# Patient Record
Sex: FEMALE | Race: WHITE | NOT HISPANIC OR LATINO | Employment: OTHER | URBAN - NONMETROPOLITAN AREA
[De-identification: names, ages, dates, MRNs, and addresses within clinical notes are randomized per-mention and may not be internally consistent; named-entity substitution may affect disease eponyms.]

---

## 2017-01-09 DIAGNOSIS — S92.024D CLOSED NONDISPLACED FRACTURE OF ANTERIOR PROCESS OF RIGHT CALCANEUS WITH ROUTINE HEALING, SUBSEQUENT ENCOUNTER: Primary | ICD-10-CM

## 2017-01-11 ENCOUNTER — OFFICE VISIT (OUTPATIENT)
Dept: ORTHOPEDIC SURGERY | Facility: CLINIC | Age: 52
End: 2017-01-11

## 2017-01-11 ENCOUNTER — HOSPITAL ENCOUNTER (OUTPATIENT)
Dept: GENERAL RADIOLOGY | Facility: HOSPITAL | Age: 52
Discharge: HOME | End: 2017-01-11
Attending: ORTHOPAEDIC SURGERY | Admitting: ORTHOPAEDIC SURGERY

## 2017-01-11 VITALS
BODY MASS INDEX: 25.4 KG/M2 | HEART RATE: 77 BPM | WEIGHT: 138 LBS | DIASTOLIC BLOOD PRESSURE: 80 MMHG | SYSTOLIC BLOOD PRESSURE: 122 MMHG | HEIGHT: 62 IN

## 2017-01-11 DIAGNOSIS — S92.254D CLOSED NONDISPLACED FRACTURE OF NAVICULAR BONE OF RIGHT FOOT WITH ROUTINE HEALING, SUBSEQUENT ENCOUNTER: ICD-10-CM

## 2017-01-11 DIAGNOSIS — S92.024D CLOSED NONDISPLACED FRACTURE OF ANTERIOR PROCESS OF RIGHT CALCANEUS WITH ROUTINE HEALING, SUBSEQUENT ENCOUNTER: ICD-10-CM

## 2017-01-11 DIAGNOSIS — S92.024D CLOSED NONDISPLACED FRACTURE OF ANTERIOR PROCESS OF RIGHT CALCANEUS WITH ROUTINE HEALING, SUBSEQUENT ENCOUNTER: Primary | ICD-10-CM

## 2017-01-11 PROBLEM — M79.671 FOOT PAIN, RIGHT: Status: ACTIVE | Noted: 2017-01-11

## 2017-01-11 PROCEDURE — 73630 X-RAY EXAM OF FOOT: CPT | Performed by: RADIOLOGY

## 2017-01-11 PROCEDURE — 73630 X-RAY EXAM OF FOOT: CPT

## 2017-01-11 PROCEDURE — 99024 POSTOP FOLLOW-UP VISIT: CPT | Performed by: ORTHOPAEDIC SURGERY

## 2017-01-11 NOTE — PROGRESS NOTES
Patient: Nicole Cannon  YOB: 1965  Date of Encounter: 01/11/2017        History of Present Illness:     51-year-old white female seen today secondary to an acute injury suffered 10/8/16. Her only complaint today is of coldness of the right foot as well as swelling, this has improved to some degree. No other complaints. She has been ambulatory and has returned back to work without significant complication. She denies any current paresthesias.     Physical Exam: 51 y.o. female .  General Appearance:    Well appearing, cooperative, in no acute distress.       Patient is alert and oriented x 3.          Musculoskeletal: She does have good movement of her toes with no subjective numbness or tingling. She has palpable dorsalis pedis and tibial posterior pulses. No tenderness to palpation of the base of the calcaneus. She can exhibit active dorsiflexion plantarflexion. Neurovascular status remains grossly intact.      Radiology:       Repeat radiographs today of the right foot and calcaneus reveal no significant change in alignment or positioning of the fracture with notable consolidation and diminishing fracture lines.    Assessment:    ICD-10-CM ICD-9-CM   1. Closed nondisplaced fracture of anterior process of right calcaneus with routine healing, subsequent encounter S92.024D V54.19   2. Closed nondisplaced fracture of navicular bone of right foot with routine healing, subsequent encounter S92.254D V54.19       Plan:    situation was discussed with patient in regards to the 3 month history of the calcaneus fracture. There does appear to be continued consolidation and diminishing fracture line as well as she seems to be responding well with ambulation with mild swelling. She will continue to progress her activity as tolerated. She will report back in 6 weeks for repeat x-rays and likely final evaluation.        Discussion and Summary:    Written by Kendall Jeffers PA-C, acting as scribe for Dr. Nelsy JEFF  Dr. Whittington personally performed the services described in this documentation, as scribed by Kendall Jeffers PA-C, in my presence, and is both accurate and complete.      This document was signed by Kendall Jeffers PA-C January 11, 2017 4:15 PM

## 2017-01-11 NOTE — MR AVS SNAPSHOT
Nicole Cannon   1/11/2017 3:20 PM   Office Visit    Dept Phone:  [phone]   Encounter #:  [id number]    Provider:  Bladimir Whittington MD   Department:  [facility]                Your Full Care Plan              Today's Medication Changes          These changes are accurate as of: 1/11/17  4:20 PM.  If you have any questions, ask your nurse or doctor.               Stop taking medication(s)listed here:     ANGELIQ 0.25-0.5 MG per tablet   Generic drug:  drospirenone-estradiol   Stopped by:  Bladimir Whittington MD           glucosamine-chondroitin 500-400 MG capsule capsule   Stopped by:  Bladimir Whittington MD           HYDROcodone-acetaminophen 5-325 MG per tablet   Commonly known as:  NORCO   Stopped by:  Bladimir Whittington MD           hydroxychloroquine 200 MG tablet   Commonly known as:  PLAQUENIL   Stopped by:  Bladimir Whittington MD           raNITIdine 75 MG tablet   Commonly known as:  ZANTAC   Stopped by:  Bladimir Whittington MD           traMADol 50 MG tablet   Commonly known as:  ULTRAM   Stopped by:  Bladimir Whittington MD           vitamin B-12 1000 MCG tablet   Commonly known as:  CYANOCOBALAMIN   Stopped by:  Bladimir Whittington MD                      Your Updated Medication List          This list is accurate as of: 1/11/17  4:20 PM.  Always use your most recent med list.                cetirizine 10 MG tablet   Commonly known as:  zyrTEC       cholecalciferol 1000 UNITS tablet   Commonly known as:  VITAMIN D3       LORazepam 0.5 MG tablet   Commonly known as:  ATIVAN   Take 1 tablet by mouth 3 (three) times a day as needed for anxiety.       melatonin 1 MG tablet       MULTI FOR HER PO       ondansetron 4 MG tablet   Commonly known as:  ZOFRAN   Take 1 tablet by mouth 4 (Four) Times a Day As Needed for nausea or vomiting. PRN Nausea/vomiting       vitamin E 400 UNIT capsule               You Were Diagnosed With        Codes Comments    Closed nondisplaced fracture of anterior process of right calcaneus with routine healing, subsequent encounter    -  Primary ICD-10-CM: S92.024D  ICD-9-CM: V54.19     Closed nondisplaced fracture of navicular bone of right foot with routine healing, subsequent encounter     ICD-10-CM: S92.254D  ICD-9-CM: V54.19       Instructions     None    Patient Instructions History      Upcoming Appointments     Visit Type Date Time Department    FOLLOW UP 2017  3:20 PM [facility] [place]    XR COR FOOT 3+ VW L 2017  3:30 PM [facility]    FOLLOW UP 2017  3:10 PM [facility] [place]      [employer] Signup     [employer] allows you to send messages to your doctor, view your test results, renew your prescriptions, schedule appointments, and more. To sign up, go to [employer] and click on the Sign Up Now link in the New User? box. Enter your [employer] Activation Code exactly as it appears below along with the last four digits of your Social Security Number and your Date of Birth () to complete the sign-up process. If you do not sign up before the expiration date, you must request a new code.    [employer] Activation Code: [email or web address]  Expires: 2017  4:19 PM    If you have questions, you can email [email or web address] or call [phone] to talk to our [employer] staff. Remember, [employer] is NOT to be used for urgent needs. For medical emergencies, dial 911.               Other Info from Your Visit           Your Appointments     2017  3:10 PM EST   Follow Up with Bladimir Whittington MD   [facility] (--)    [address]  [place] [address] [phone]           Arrive 15 minutes prior to appointment.              Allergies     No Known Allergies      Reason for Visit     Right Foot - Pain     Foot Pain           Vital Signs     Blood Pressure Pulse Height Weight Body Mass Index Smoking Status     "122/80 77 62\" (157.5 cm) 138 lb (62.6 kg) 25.24 kg/m2 Never Smoker      Problems and Diagnoses Noted     Foot pain, right    Closed nondisplaced fracture of navicular bone of right foot with routine healing, subsequent encounter            "

## 2017-03-09 ENCOUNTER — OFFICE VISIT (OUTPATIENT)
Dept: FAMILY MEDICINE CLINIC | Facility: CLINIC | Age: 52
End: 2017-03-09

## 2017-03-09 VITALS
SYSTOLIC BLOOD PRESSURE: 135 MMHG | DIASTOLIC BLOOD PRESSURE: 84 MMHG | OXYGEN SATURATION: 99 % | BODY MASS INDEX: 26.68 KG/M2 | TEMPERATURE: 98.2 F | HEIGHT: 62 IN | WEIGHT: 145 LBS | HEART RATE: 89 BPM

## 2017-03-09 DIAGNOSIS — F41.1 GENERALIZED ANXIETY DISORDER: Primary | ICD-10-CM

## 2017-03-09 DIAGNOSIS — S92.024S CLOSED NONDISPLACED FRACTURE OF ANTERIOR PROCESS OF RIGHT CALCANEUS, SEQUELA: ICD-10-CM

## 2017-03-09 PROCEDURE — 99213 OFFICE O/P EST LOW 20 MIN: CPT | Performed by: FAMILY MEDICINE

## 2017-03-09 RX ORDER — LORAZEPAM 0.5 MG/1
0.5 TABLET ORAL 3 TIMES DAILY PRN
Qty: 75 TABLET | Refills: 5
Start: 2017-03-09 | End: 2017-08-28 | Stop reason: SDUPTHER

## 2017-03-09 NOTE — PROGRESS NOTES
"Subjective   Nicole Cannon is a 51 y.o. female.     History of Present Illness follow-up for medication management regarding the anxiety.  Unfortunately is having persistent/recurrent problems with the right foot.  She had fracture and has had orthopedic evaluation.  Having no other new problems.  Has been trying to go about routine.  Has some degree of stiffness questionable some degree of decreased range of motion and also some numbness after extensive time on it.  Continues to utilize the anxiolytic 2-3 times daily fairly routinely.  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    The following portions of the patient's history were reviewed and updated as appropriate: allergies, current medications, past family history, past social history, past surgical history and problem list.    Review of Systems the the history of present illness    Objective   Physical Exam   Constitutional: She is oriented to person, place, and time. She appears well-developed and well-nourished.   Neurological: She is alert and oriented to person, place, and time.   Psychiatric: She has a normal mood and affect.   Vitals reviewed.    Visit Vitals   • /84 (BP Location: Left arm, Patient Position: Sitting, Cuff Size: Adult)   • Pulse 89   • Temp 98.2 °F (36.8 °C) (Oral)   • Ht 62\" (157.5 cm)   • Wt 145 lb (65.8 kg)   • SpO2 99%   • BMI 26.52 kg/m2     Assessment/Plan   Nicole was seen today for foot injury and med refill.    Diagnoses and all orders for this visit:    Generalized anxiety disorder    Closed nondisplaced fracture of anterior process of right calcaneus, sequela    Other orders  -     LORazepam (ATIVAN) 0.5 MG tablet; Take 1 tablet by mouth 3 (Three) Times a Day As Needed for Anxiety.      Today I renewed the anxiolytic #75 with 5 refills.  I encourage you to pursue using less of that " medicine as we have talked before.  In regards to the foot I recommend you follow-up with orthopedist and you'll pursue self referral back.  Follow-up with us as needed.  As part of this patient's treatment plan I am prescribing controlled substances. The patient has been made aware of appropriate use of such medications, including potential risk of somnolence, limited ability to drive and/or work safely, and potential for dependence or overdose. It has also been made clear that these medications are for use by this patient only, without concomitant use of alcohol or other substances unless prescribed.  Patient has completed prescribing agreement detailing terms of continued prescribing of controlled substances, including monitoring JINNY reports, urine drug screening, and pill counts if necessary. The patient is aware that inappropriate use will results in cessation of prescribing such medications.  JINNY report has been reviewed.  JINNY is updated every 3 months.  History and physical exam exhibit continued safe and appropriate use of controlled substances.     .

## 2017-04-11 DIAGNOSIS — S92.024D CLOSED NONDISPLACED FRACTURE OF ANTERIOR PROCESS OF RIGHT CALCANEUS WITH ROUTINE HEALING, SUBSEQUENT ENCOUNTER: Primary | ICD-10-CM

## 2017-04-12 ENCOUNTER — HOSPITAL ENCOUNTER (OUTPATIENT)
Dept: GENERAL RADIOLOGY | Facility: HOSPITAL | Age: 52
Discharge: HOME | End: 2017-04-12
Attending: ORTHOPAEDIC SURGERY | Admitting: ORTHOPAEDIC SURGERY

## 2017-04-12 ENCOUNTER — OFFICE VISIT (OUTPATIENT)
Dept: ORTHOPEDIC SURGERY | Facility: CLINIC | Age: 52
End: 2017-04-12

## 2017-04-12 VITALS — BODY MASS INDEX: 26.68 KG/M2 | HEIGHT: 62 IN | WEIGHT: 145 LBS

## 2017-04-12 DIAGNOSIS — M79.671 RIGHT FOOT PAIN: ICD-10-CM

## 2017-04-12 DIAGNOSIS — S92.024D CLOSED NONDISPLACED FRACTURE OF ANTERIOR PROCESS OF RIGHT CALCANEUS WITH ROUTINE HEALING, SUBSEQUENT ENCOUNTER: ICD-10-CM

## 2017-04-12 DIAGNOSIS — S92.024S CLOSED NONDISPLACED FRACTURE OF ANTERIOR PROCESS OF RIGHT CALCANEUS, SEQUELA: Primary | ICD-10-CM

## 2017-04-12 PROCEDURE — 73630 X-RAY EXAM OF FOOT: CPT | Performed by: RADIOLOGY

## 2017-04-12 PROCEDURE — 73630 X-RAY EXAM OF FOOT: CPT

## 2017-04-12 PROCEDURE — 99213 OFFICE O/P EST LOW 20 MIN: CPT | Performed by: ORTHOPAEDIC SURGERY

## 2017-04-12 NOTE — PROGRESS NOTES
Patient: Nicole Cannon  YOB: 1965  Date of Encounter: 04/12/2017        History of Present Illness:   Nicole Cannon, 51 y.o. Female, presents today in follow up of right calcaneal fracture approximately 6 months ago.   She complains of pain and swelling, especially with weightbearing and walking.  She has had no additional injuries.  Was doing reasonably well in the fall but has simply plateaued and in very little improvement in the last few months.    Examination:   Right Foot: He has mild edema of the right foot compared to the left.  She has generalized tenderness about her entire foot area and she has 0 subtalar motion.  Normal neurovascular status.    Radiology:     3 views right foot obtained today show the fracture of her calcaneal body to be united with normal gross architecture of her calcaneus.  She has moderate osteopenia.    EXAMINATION: XR FOOT 3+ VW RIGHT-       CLINICAL INDICATION: right calcaneus fx; S92.024D-Nondisplaced fracture  of anterior process of right calcaneus, subsequent encounter for  fracture with routine healing          COMPARISON: 01/11/2017      FINDINGS: 3 views of the right foot show no acute fracture or  dislocation. There are no blastic or lytic lesions.      IMPRESSION:  No acute bony abnormality.  Particularly no new calcaneal fracture       This report was finalized on 4/12/2017 3:39 PM by Dr. Albaro Barlow MD.        Assessment:   51-year-old female with pain, decreased range of motion and swelling 6 months post right calcaneal fracture.  She is obviously not doing as well as expected given the initial appearance of her fracture.  Does have symptoms consistent with RSD although very mild.    ICD-10-CM ICD-9-CM   1. Closed nondisplaced fracture of anterior process of right calcaneus, sequela S92.024S 825.0   2. Right foot pain M79.671 729.5       Plan:  Today she is referred to physical therapy for an 8 week course. Will reevaluate in 8 weeks.  They will work on  her subtalar motion and also we have asked them to address her RSD-like symptoms      Cc Dr. Erickson Lira

## 2017-05-25 ENCOUNTER — OFFICE VISIT (OUTPATIENT)
Dept: FAMILY MEDICINE CLINIC | Facility: CLINIC | Age: 52
End: 2017-05-25

## 2017-05-25 VITALS
WEIGHT: 145 LBS | DIASTOLIC BLOOD PRESSURE: 95 MMHG | BODY MASS INDEX: 26.68 KG/M2 | HEART RATE: 81 BPM | SYSTOLIC BLOOD PRESSURE: 134 MMHG | TEMPERATURE: 98.8 F | OXYGEN SATURATION: 96 % | HEIGHT: 62 IN

## 2017-05-25 DIAGNOSIS — S39.012A LUMBAR STRAIN, INITIAL ENCOUNTER: Primary | ICD-10-CM

## 2017-05-25 PROCEDURE — 99213 OFFICE O/P EST LOW 20 MIN: CPT | Performed by: NURSE PRACTITIONER

## 2017-05-25 RX ORDER — METHOCARBAMOL 500 MG/1
500 TABLET, FILM COATED ORAL 3 TIMES DAILY PRN
Qty: 21 TABLET | Refills: 0 | Status: SHIPPED | OUTPATIENT
Start: 2017-05-25 | End: 2017-08-28

## 2017-05-25 RX ORDER — DICLOFENAC SODIUM 75 MG/1
TABLET, DELAYED RELEASE ORAL 2 TIMES DAILY
COMMUNITY
Start: 2017-05-23 | End: 2017-08-28

## 2017-08-28 ENCOUNTER — OFFICE VISIT (OUTPATIENT)
Dept: FAMILY MEDICINE CLINIC | Facility: CLINIC | Age: 52
End: 2017-08-28

## 2017-08-28 VITALS
BODY MASS INDEX: 25.51 KG/M2 | SYSTOLIC BLOOD PRESSURE: 137 MMHG | HEIGHT: 62 IN | HEART RATE: 80 BPM | WEIGHT: 138.6 LBS | TEMPERATURE: 98.8 F | DIASTOLIC BLOOD PRESSURE: 90 MMHG

## 2017-08-28 DIAGNOSIS — F41.1 GENERALIZED ANXIETY DISORDER: Primary | ICD-10-CM

## 2017-08-28 PROCEDURE — 99213 OFFICE O/P EST LOW 20 MIN: CPT | Performed by: FAMILY MEDICINE

## 2017-08-28 RX ORDER — LORAZEPAM 0.5 MG/1
0.5 TABLET ORAL 3 TIMES DAILY PRN
Qty: 75 TABLET | Refills: 5
Start: 2017-08-28 | End: 2018-02-08 | Stop reason: SDUPTHER

## 2017-08-29 NOTE — PROGRESS NOTES
"Subjective   Nicole Cannon is a 52 y.o. female.     History of Present Illness follow-up for medication management in regards to the chronic anxiety.  All things are doing pretty good.  Last year of school.  Still concerned with the right ankle foot orthopedic injury.  Has had more evaluation.  Will be visiting with a podiatrist in near future also.  Compliant with current medication regimen without adverse effects.  The following portions of the patient's history were reviewed and updated as appropriate: allergies, current medications, past medical history, past social history, past surgical history and problem list.    Review of Systems   Constitutional: Negative.    HENT: Negative.    Eyes: Negative.    Respiratory: Negative.    Cardiovascular: Negative.    Gastrointestinal: Negative.    Endocrine: Negative.    Genitourinary: Negative.    Musculoskeletal:        See the history of present illness   Skin: Negative.    Allergic/Immunologic: Negative.    Neurological: Negative.    Hematological: Negative.    Psychiatric/Behavioral: Negative.        Objective   Physical Exam   Constitutional: She is oriented to person, place, and time. She appears well-developed and well-nourished.   HENT:   Head: Normocephalic.   Mouth/Throat: Oropharynx is clear and moist.   Eyes: Conjunctivae and EOM are normal. Pupils are equal, round, and reactive to light.   Cardiovascular: Normal rate, regular rhythm and normal heart sounds.    No murmur heard.  Pulmonary/Chest: Effort normal and breath sounds normal.   Musculoskeletal: She exhibits no edema.   Neurological: She is alert and oriented to person, place, and time.   Skin: Skin is warm and dry.   Psychiatric: She has a normal mood and affect.   Vitals reviewed.    /90 (BP Location: Right arm, Patient Position: Sitting, Cuff Size: Adult)  Pulse 80  Temp 98.8 °F (37.1 °C) (Oral)   Ht 62\" (157.5 cm)  Wt 138 lb 9.6 oz (62.9 kg)  BMI 25.35 kg/m2  Assessment/Plan   Nicole was " seen today for anxiety.    Diagnoses and all orders for this visit:    Generalized anxiety disorder  -     LORazepam (ATIVAN) 0.5 MG tablet; Take 1 tablet by mouth 3 (Three) Times a Day As Needed for Anxiety.    Medication renewed as noted.  Counseled as regards to utilization.  We discussed the orthopedic concerns.  I encourage you to continue to pursue.  Hopefully things will work out to your desire regarding pending FPC.  Follow-up in about 6 months or as needed.  Flu vaccine soon.  As part of this patient's treatment plan I am prescribing controlled substances. The patient has been made aware of appropriate use of such medications, including potential risk of somnolence, limited ability to drive and/or work safely, and potential for dependence or overdose. It has also been made clear that these medications are for use by this patient only, without concomitant use of alcohol or other substances unless prescribed.  Patient has completed prescribing agreement detailing terms of continued prescribing of controlled substances, including monitoring JINNY reports, urine drug screening, and pill counts if necessary. The patient is aware that inappropriate use will results in cessation of prescribing such medications.  JINNY report has been reviewed.  JINNY is updated every 3 months.  History and physical exam exhibit continued safe and appropriate use of controlled substances.

## 2018-02-08 ENCOUNTER — OFFICE VISIT (OUTPATIENT)
Dept: FAMILY MEDICINE CLINIC | Facility: CLINIC | Age: 53
End: 2018-02-08

## 2018-02-08 VITALS
WEIGHT: 129.4 LBS | DIASTOLIC BLOOD PRESSURE: 87 MMHG | HEIGHT: 62 IN | HEART RATE: 83 BPM | TEMPERATURE: 98.1 F | SYSTOLIC BLOOD PRESSURE: 131 MMHG | BODY MASS INDEX: 23.81 KG/M2

## 2018-02-08 DIAGNOSIS — F41.1 GENERALIZED ANXIETY DISORDER: Primary | ICD-10-CM

## 2018-02-08 PROCEDURE — 99213 OFFICE O/P EST LOW 20 MIN: CPT | Performed by: FAMILY MEDICINE

## 2018-02-08 RX ORDER — LORAZEPAM 0.5 MG/1
0.5 TABLET ORAL 3 TIMES DAILY PRN
Qty: 75 TABLET | Refills: 5
Start: 2018-02-08 | End: 2018-08-13 | Stop reason: SDUPTHER

## 2018-02-08 RX ORDER — ASCORBIC ACID 500 MG
500 TABLET ORAL DAILY
Status: ON HOLD | COMMUNITY
End: 2020-12-31

## 2018-08-13 ENCOUNTER — OFFICE VISIT (OUTPATIENT)
Dept: FAMILY MEDICINE CLINIC | Facility: CLINIC | Age: 53
End: 2018-08-13

## 2018-08-13 VITALS
DIASTOLIC BLOOD PRESSURE: 80 MMHG | HEART RATE: 77 BPM | BODY MASS INDEX: 24.01 KG/M2 | OXYGEN SATURATION: 98 % | HEIGHT: 62 IN | TEMPERATURE: 98 F | SYSTOLIC BLOOD PRESSURE: 136 MMHG | WEIGHT: 130.5 LBS

## 2018-08-13 DIAGNOSIS — F41.1 GENERALIZED ANXIETY DISORDER: ICD-10-CM

## 2018-08-13 PROCEDURE — 99213 OFFICE O/P EST LOW 20 MIN: CPT | Performed by: FAMILY MEDICINE

## 2018-08-13 RX ORDER — LORAZEPAM 0.5 MG/1
0.5 TABLET ORAL 3 TIMES DAILY PRN
Qty: 75 TABLET | Refills: 5
Start: 2018-08-13 | End: 2019-03-04 | Stop reason: SDUPTHER

## 2018-08-13 NOTE — PROGRESS NOTES
Subjective   Nicole Cannon is a 53 y.o. female.     History of Present Illness follow-up regarding medication management.  Generally having no new difficulties.  Utilizing medications as reconciled.  So far is enjoying prison.    The following portions of the patient's history were reviewed and updated as appropriate: current medications, past family history, past medical history, past social history, past surgical history and problem list.    Review of Systems   Constitutional: Negative.    HENT: Negative.    Eyes: Negative.    Respiratory: Negative.    Cardiovascular: Negative.    Gastrointestinal: Negative.    Endocrine: Negative.    Genitourinary: Negative.    Musculoskeletal: Negative.    Skin: Negative.    Allergic/Immunologic: Negative.    Neurological: Negative.    Hematological: Negative.    Psychiatric/Behavioral: Negative.       Objective   Physical Exam   Constitutional: She appears well-developed.   Neurological: She is alert.   Psychiatric: She has a normal mood and affect.       Assessment/Plan   Nicole was seen today for med refill and generalized anxiety disorder.    Diagnoses and all orders for this visit:    Generalized anxiety disorder  -     LORazepam (ATIVAN) 0.5 MG tablet; Take 1 tablet by mouth 3 (Three) Times a Day As Needed for Anxiety.     Medication renewed.  Encourage to work toward less utilization.  You are in agreement.  Follow-up in a few months routinely.  Stay safely active.  Maintain heart healthy diet.  As part of this patient's treatment plan I am prescribing controlled substances. The patient has been made aware of appropriate use of such medications, including potential risk of somnolence, limited ability to drive and/or work safely, and potential for dependence or overdose. It has also been made clear that these medications are for use by this patient only, without concomitant use of alcohol or other substances unless prescribed.  Patient has completed prescribing agreement  detailing terms of continued prescribing of controlled substances, including monitoring JINNY reports, urine drug screening, and pill counts if necessary. The patient is aware that inappropriate use will results in cessation of prescribing such medications.  JINNY report has been reviewed.  JINNY is updated every 3 months.  History and physical exam exhibit continued safe and appropriate use of controlled substances.     Patient's Body mass index is 23.86 kg/m². BMI is within normal parameters. No follow-up required.

## 2019-03-04 ENCOUNTER — OFFICE VISIT (OUTPATIENT)
Dept: FAMILY MEDICINE CLINIC | Facility: CLINIC | Age: 54
End: 2019-03-04

## 2019-03-04 VITALS
HEIGHT: 62 IN | DIASTOLIC BLOOD PRESSURE: 76 MMHG | WEIGHT: 134 LBS | BODY MASS INDEX: 24.66 KG/M2 | SYSTOLIC BLOOD PRESSURE: 130 MMHG | HEART RATE: 94 BPM | TEMPERATURE: 97.6 F

## 2019-03-04 DIAGNOSIS — Z12.83 SKIN EXAM, SCREENING FOR CANCER: ICD-10-CM

## 2019-03-04 DIAGNOSIS — F41.1 GENERALIZED ANXIETY DISORDER: Primary | ICD-10-CM

## 2019-03-04 PROBLEM — M79.671 FOOT PAIN, RIGHT: Status: RESOLVED | Noted: 2017-01-11 | Resolved: 2019-03-04

## 2019-03-04 PROCEDURE — 99213 OFFICE O/P EST LOW 20 MIN: CPT | Performed by: FAMILY MEDICINE

## 2019-03-04 RX ORDER — LORAZEPAM 0.5 MG/1
0.5 TABLET ORAL 2 TIMES DAILY PRN
Qty: 60 TABLET | Refills: 3
Start: 2019-03-04 | End: 2019-07-09 | Stop reason: SDUPTHER

## 2019-03-04 RX ORDER — LORAZEPAM 0.5 MG/1
0.5 TABLET ORAL 3 TIMES DAILY PRN
Qty: 60 TABLET | Refills: 3
Start: 2019-03-04 | End: 2019-03-04

## 2019-03-04 RX ORDER — PSEUDOEPHEDRINE HCL 30 MG
30 TABLET ORAL AS NEEDED
Status: ON HOLD | COMMUNITY
End: 2020-12-31

## 2019-03-04 NOTE — PROGRESS NOTES
"Subjective     No chief complaint on file.      Nicole Cannon is a 53 y.o. female.     History of Present Illness follow-up regarding medication management for the mild ELSI.  Has successfully been taking some less medication.  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  Has no significant new concerns.  Is starting to develop some new skin lesions and changes desires check.  Denies recent illness.  Denies CP S OB palpitations  GI changes.  Good energy.  No regular exercise.  Maintaining follow-up at gynecology.    The following portions of the patient's history were reviewed and updated as appropriate: current medications, past family history, past medical history, past social history, past surgical history and problem list.    Review of Systems see the HPI    Objective   Physical Exam   Constitutional: She is oriented to person, place, and time. She appears well-developed.   HENT:   Head: Normocephalic.   Cardiovascular: Normal rate, regular rhythm and normal heart sounds.   Pulmonary/Chest: Effort normal and breath sounds normal.   Neurological: She is alert and oriented to person, place, and time.   Psychiatric: She has a normal mood and affect.     /76   Pulse 94   Temp 97.6 °F (36.4 °C) (Oral)   Ht 157.5 cm (62.01\")   Wt 60.8 kg (134 lb)   BMI 24.50 kg/m²   Assessment/Plan   Diagnoses and all orders for this visit:    Generalized anxiety disorder  -     Discontinue: LORazepam (ATIVAN) 0.5 MG tablet; Take 1 tablet by mouth 3 (Three) Times a Day As Needed for Anxiety.  -     LORazepam (ATIVAN) 0.5 MG tablet; Take 1 tablet by mouth 2 (Two) Times a Day As Needed for Anxiety.    Skin exam, screening for cancer  -     Ambulatory Referral to Dermatology    Counseled.  Stephen reviewed.  Will continue the lorazepam but lower dosing.  Twice daily.  Gave 60 with 3 refills.  Follow-up in about 4 months.  We will continue to pursue gradual dose reduction.  Will make referral to dermatology.  As part " of this patient's treatment plan I am prescribing controlled substances. The patient has been made aware of appropriate use of such medications, including potential risk of somnolence, limited ability to drive and/or work safely, and potential for dependence or overdose. It has also been made clear that these medications are for use by this patient only, without concomitant use of alcohol or other substances unless prescribed.  Patient has completed prescribing agreement detailing terms of continued prescribing of controlled substances, including monitoring JINNY reports, urine drug screening, and pill counts if necessary. The patient is aware that inappropriate use will results in cessation of prescribing such medications.  JINNY report has been reviewed.  JINNY is updated every 3 months.  History and physical exam exhibit continued safe and appropriate use of controlled substances.

## 2019-07-09 ENCOUNTER — OFFICE VISIT (OUTPATIENT)
Dept: FAMILY MEDICINE CLINIC | Facility: CLINIC | Age: 54
End: 2019-07-09

## 2019-07-09 VITALS
OXYGEN SATURATION: 98 % | HEART RATE: 105 BPM | HEIGHT: 62 IN | TEMPERATURE: 97.8 F | WEIGHT: 133.7 LBS | SYSTOLIC BLOOD PRESSURE: 148 MMHG | DIASTOLIC BLOOD PRESSURE: 72 MMHG | BODY MASS INDEX: 24.61 KG/M2

## 2019-07-09 DIAGNOSIS — E78.2 MIXED HYPERLIPIDEMIA: Primary | ICD-10-CM

## 2019-07-09 DIAGNOSIS — F41.1 GENERALIZED ANXIETY DISORDER: ICD-10-CM

## 2019-07-09 LAB
ALBUMIN SERPL-MCNC: 5.1 G/DL (ref 3.5–5.2)
ALBUMIN/GLOB SERPL: 2.1 G/DL
ALP SERPL-CCNC: 91 U/L (ref 39–117)
ALT SERPL W P-5'-P-CCNC: 15 U/L (ref 1–33)
ANION GAP SERPL CALCULATED.3IONS-SCNC: 15.2 MMOL/L (ref 5–15)
AST SERPL-CCNC: 20 U/L (ref 1–32)
BASOPHILS # BLD AUTO: 0.03 10*3/MM3 (ref 0–0.2)
BASOPHILS NFR BLD AUTO: 0.4 % (ref 0–1.5)
BILIRUB SERPL-MCNC: 0.3 MG/DL (ref 0.2–1.2)
BUN BLD-MCNC: 14 MG/DL (ref 6–20)
BUN/CREAT SERPL: 16.1 (ref 7–25)
CALCIUM SPEC-SCNC: 10.2 MG/DL (ref 8.6–10.5)
CHLORIDE SERPL-SCNC: 103 MMOL/L (ref 98–107)
CHOLEST SERPL-MCNC: 256 MG/DL (ref 0–200)
CO2 SERPL-SCNC: 22.8 MMOL/L (ref 22–29)
CREAT BLD-MCNC: 0.87 MG/DL (ref 0.57–1)
DEPRECATED RDW RBC AUTO: 41.1 FL (ref 37–54)
EOSINOPHIL # BLD AUTO: 0.05 10*3/MM3 (ref 0–0.4)
EOSINOPHIL NFR BLD AUTO: 0.7 % (ref 0.3–6.2)
ERYTHROCYTE [DISTWIDTH] IN BLOOD BY AUTOMATED COUNT: 11.9 % (ref 12.3–15.4)
GFR SERPL CREATININE-BSD FRML MDRD: 68 ML/MIN/1.73
GLOBULIN UR ELPH-MCNC: 2.4 GM/DL
GLUCOSE BLD-MCNC: 119 MG/DL (ref 65–99)
HCT VFR BLD AUTO: 44.3 % (ref 34–46.6)
HDLC SERPL-MCNC: 50 MG/DL (ref 40–60)
HGB BLD-MCNC: 14.1 G/DL (ref 12–15.9)
IMM GRANULOCYTES # BLD AUTO: 0.02 10*3/MM3 (ref 0–0.05)
IMM GRANULOCYTES NFR BLD AUTO: 0.3 % (ref 0–0.5)
LDLC SERPL CALC-MCNC: 165 MG/DL (ref 0–100)
LDLC/HDLC SERPL: 3.3 {RATIO}
LYMPHOCYTES # BLD AUTO: 2.27 10*3/MM3 (ref 0.7–3.1)
LYMPHOCYTES NFR BLD AUTO: 32.4 % (ref 19.6–45.3)
MCH RBC QN AUTO: 29.8 PG (ref 26.6–33)
MCHC RBC AUTO-ENTMCNC: 31.8 G/DL (ref 31.5–35.7)
MCV RBC AUTO: 93.7 FL (ref 79–97)
MONOCYTES # BLD AUTO: 0.4 10*3/MM3 (ref 0.1–0.9)
MONOCYTES NFR BLD AUTO: 5.7 % (ref 5–12)
NEUTROPHILS # BLD AUTO: 4.24 10*3/MM3 (ref 1.7–7)
NEUTROPHILS NFR BLD AUTO: 60.5 % (ref 42.7–76)
NRBC BLD AUTO-RTO: 0 /100 WBC (ref 0–0.2)
PLATELET # BLD AUTO: 287 10*3/MM3 (ref 140–450)
PMV BLD AUTO: 10.9 FL (ref 6–12)
POTASSIUM BLD-SCNC: 4.2 MMOL/L (ref 3.5–5.2)
PROT SERPL-MCNC: 7.5 G/DL (ref 6–8.5)
RBC # BLD AUTO: 4.73 10*6/MM3 (ref 3.77–5.28)
SODIUM BLD-SCNC: 141 MMOL/L (ref 136–145)
TRIGL SERPL-MCNC: 204 MG/DL (ref 0–150)
VLDLC SERPL-MCNC: 40.8 MG/DL (ref 5–40)
WBC NRBC COR # BLD: 7.01 10*3/MM3 (ref 3.4–10.8)

## 2019-07-09 PROCEDURE — 85025 COMPLETE CBC W/AUTO DIFF WBC: CPT | Performed by: FAMILY MEDICINE

## 2019-07-09 PROCEDURE — 99214 OFFICE O/P EST MOD 30 MIN: CPT | Performed by: FAMILY MEDICINE

## 2019-07-09 PROCEDURE — 84443 ASSAY THYROID STIM HORMONE: CPT | Performed by: FAMILY MEDICINE

## 2019-07-09 PROCEDURE — 80053 COMPREHEN METABOLIC PANEL: CPT | Performed by: FAMILY MEDICINE

## 2019-07-09 PROCEDURE — 36415 COLL VENOUS BLD VENIPUNCTURE: CPT | Performed by: FAMILY MEDICINE

## 2019-07-09 PROCEDURE — 80061 LIPID PANEL: CPT | Performed by: FAMILY MEDICINE

## 2019-07-09 RX ORDER — VENLAFAXINE HYDROCHLORIDE 37.5 MG/1
37.5 CAPSULE, EXTENDED RELEASE ORAL DAILY
Qty: 30 CAPSULE | Refills: 6 | Status: SHIPPED | OUTPATIENT
Start: 2019-07-09 | End: 2020-01-21 | Stop reason: SDUPTHER

## 2019-07-09 RX ORDER — LORAZEPAM 0.5 MG/1
0.5 TABLET ORAL 2 TIMES DAILY PRN
Qty: 60 TABLET | Refills: 3
Start: 2019-07-09 | End: 2020-01-21 | Stop reason: SDUPTHER

## 2019-07-09 NOTE — PROGRESS NOTES
Subjective   Nicole Cannon is a 53 y.o. female.     History of Present Illness follow-up medication management for the anxiety.  Doing overall better.  Enjoys family and being grandmother.  Has had no significant changes.  Denies recent illness.  Has had mammogram this year.  Still experiencing some menopausal type symptoms.  Denies chest CDV GI  skin orthopedic concerns.    The following portions of the patient's history were reviewed and updated as appropriate: allergies, current medications, past family history, past medical history, past surgical history and problem list.    Review of Systems  See history of Present Illness     Objective     Physical Exam   Constitutional: She is oriented to person, place, and time. She appears well-developed.   HENT:   Head: Normocephalic.   Eyes: Conjunctivae are normal.   Neck: Normal range of motion. Neck supple.   Cardiovascular: Normal rate, regular rhythm and normal heart sounds.   Pulmonary/Chest: Effort normal and breath sounds normal.   Neurological: She is alert and oriented to person, place, and time.   Psychiatric: She has a normal mood and affect.       PHQ-9 Total Score:      Patient's Body mass index is 24.45 kg/m². BMI is within normal parameters. No follow-up required..   (Normal BMI:  18.5-24.9, OW 25-29.9, Obesity 30 or greater)      Assessment/Plan     Nicole was seen today for hyperlipidemia and generalized anxiety disorder.    Diagnoses and all orders for this visit:    Mixed hyperlipidemia  -     CBC & Differential  -     Comprehensive Metabolic Panel  -     Lipid Panel  -     TSH  -     CBC Auto Differential    Generalized anxiety disorder  -     LORazepam (ATIVAN) 0.5 MG tablet; Take 1 tablet by mouth 2 (Two) Times a Day As Needed for Anxiety.  -     venlafaxine XR (EFFEXOR-XR) 37.5 MG 24 hr capsule; Take 1 capsule by mouth Daily.    Renewed medication as noted.  Will check labs regarding metabolic status.  History of elevated lipids in past.  Continue  to utilize less of the lorazepam as we discussed.  We will also initiate low-dose venlafaxine to help with anxiety and also possibly menopausal symptoms.  Stay safely active.  Maintain heart healthy diet.  Recheck in about 4 to 6 months or as needed.  Will notify of lab results.  As part of this patient's treatment plan I am prescribing controlled substances. The patient has been made aware of appropriate use of such medications, including potential risk of somnolence, limited ability to drive and/or work safely, and potential for dependence or overdose. It has also been made clear that these medications are for use by this patient only, without concomitant use of alcohol or other substances unless prescribed.  Patient has completed prescribing agreement detailing terms of continued prescribing of controlled substances, including monitoring JINNY reports, urine drug screening, and pill counts if necessary. The patient is aware that inappropriate use will results in cessation of prescribing such medications.  JINNY report has been reviewed.  JINNY is updated every 3 months.  History and physical exam exhibit continued safe and appropriate use of controlled substances.                        This document has been electronically signed by Erickson Lira MD   July 9, 2019 1:35 PM

## 2019-07-10 LAB — TSH SERPL DL<=0.05 MIU/L-ACNC: 1.7 MIU/ML (ref 0.27–4.2)

## 2019-07-11 ENCOUNTER — OFFICE VISIT (OUTPATIENT)
Dept: FAMILY MEDICINE CLINIC | Facility: CLINIC | Age: 54
End: 2019-07-11

## 2019-07-11 VITALS
WEIGHT: 134.6 LBS | OXYGEN SATURATION: 97 % | TEMPERATURE: 99.9 F | HEIGHT: 62 IN | BODY MASS INDEX: 24.77 KG/M2 | HEART RATE: 102 BPM | SYSTOLIC BLOOD PRESSURE: 118 MMHG | DIASTOLIC BLOOD PRESSURE: 76 MMHG

## 2019-07-11 DIAGNOSIS — B02.9 HERPES ZOSTER WITHOUT COMPLICATION: Primary | ICD-10-CM

## 2019-07-11 PROCEDURE — 99213 OFFICE O/P EST LOW 20 MIN: CPT | Performed by: NURSE PRACTITIONER

## 2019-07-11 RX ORDER — FAMCICLOVIR 500 MG/1
500 TABLET ORAL 3 TIMES DAILY
Qty: 30 TABLET | Refills: 0 | Status: SHIPPED | OUTPATIENT
Start: 2019-07-11 | End: 2020-01-21

## 2019-07-11 NOTE — PROGRESS NOTES
Labs are unremarkable except cholesterol is quite high.  Need to truly consider a medication to lower cholesterol.  Let me know if willing.  Needs to address with diet changes less meats fats dairy foods more fiber.

## 2019-07-11 NOTE — PROGRESS NOTES
Subjective   Nicole Cannon is a 53 y.o. female.     Chief Complaint   Patient presents with   • Herpes Zoster       She presents with c/o a rash on her left eyelid that started yesterday. She c/o pounding headache that started yesterday. She states it stings like a bee sting. She states she has had shingles in the past and it feels like shingles. She states she had shingles on her right ear and had nicole hunt syndrome. She states she still has some vertigo. She states she felt like she had something in her left eye yesterday but it feels better today. She states she has felt tired, completely wiped out.          The following portions of the patient's history were reviewed and updated as appropriate: allergies, current medications, past family history, past medical history, past social history, past surgical history and problem list.    Review of Systems   Constitutional: Positive for fatigue. Negative for fever and unexpected weight change.   HENT: Negative for ear pain, rhinorrhea and sore throat.    Eyes: Negative for pain, redness and visual disturbance.   Respiratory: Negative for cough, chest tightness and shortness of breath.    Cardiovascular: Negative for chest pain, palpitations and leg swelling.   Gastrointestinal: Positive for nausea. Negative for abdominal pain, blood in stool, constipation, diarrhea and vomiting.   Endocrine: Negative for cold intolerance and heat intolerance.   Genitourinary: Negative for dysuria and hematuria.   Musculoskeletal: Negative for arthralgias and myalgias.   Skin: Positive for rash. Negative for color change.   Allergic/Immunologic: Negative for environmental allergies.   Neurological: Positive for dizziness and headaches.   Hematological: Negative for adenopathy.   Psychiatric/Behavioral: Negative for suicidal ideas. The patient is not nervous/anxious.        Objective     /76 (BP Location: Right arm, Patient Position: Sitting, Cuff Size: Adult)   Pulse 102   Temp  "99.9 °F (37.7 °C)   Ht 157.5 cm (62.01\")   Wt 61.1 kg (134 lb 9.6 oz)   SpO2 97%   BMI 24.61 kg/m²     Physical Exam   Constitutional: She is oriented to person, place, and time. Vital signs are normal. She appears well-developed and well-nourished. No distress.   HENT:   Head: Normocephalic and atraumatic.   Right Ear: Hearing, tympanic membrane, external ear and ear canal normal.   Left Ear: Hearing, tympanic membrane, external ear and ear canal normal.   Nose: Nose normal. Right sinus exhibits no maxillary sinus tenderness and no frontal sinus tenderness. Left sinus exhibits no maxillary sinus tenderness and no frontal sinus tenderness.   Eyes: Conjunctivae, EOM and lids are normal. Pupils are equal, round, and reactive to light.   Neck: Trachea normal. Neck supple. No tracheal tenderness present. No tracheal deviation present. No thyromegaly present.   Cardiovascular: Normal rate, regular rhythm, S1 normal, S2 normal, normal heart sounds, intact distal pulses and normal pulses. Exam reveals no gallop and no friction rub.   No murmur heard.  Pulmonary/Chest: Effort normal and breath sounds normal. No respiratory distress.   Abdominal: Soft. Bowel sounds are normal. She exhibits no distension. There is no tenderness.   Musculoskeletal: She exhibits no edema.   Neurological: She is alert and oriented to person, place, and time.   Skin: Skin is warm and dry. Rash noted. Rash is vesicular. She is not diaphoretic.   Erythematous vesicular rash left eyebrow.   Psychiatric: She has a normal mood and affect. Her behavior is normal. Judgment and thought content normal.       Assessment/Plan     Problem List Items Addressed This Visit     None      Visit Diagnoses     Herpes zoster without complication    -  Primary    Relevant Medications    famciclovir (FAMVIR) 500 MG tablet    Other Relevant Orders    Ambulatory Referral to Ophthalmology          Plan: Famcyclovir ordered for shingles.  I have instructed her she " should see opthalmology immediately if she develops pain in the left eye or change in vision in the left eye. She verbalized an understanding of the same.  I have given her a referral to opthalmology only to be used if she needs it. She plans to get the shingrix vaccine when the rash clears.     Patient's Body mass index is 24.61 kg/m². BMI is within normal parameters. No follow-up required..   (Normal BMI:  18.5-24.9, OW 25-29.9, Obesity 30 or greater)            Understands disease processes and need for medications.  Understands reasons for urgent and emergent care.  Patient (& family) verbalized agreement for treatment plan.   Emotional support and active listening provided.  Patient provided time to verbalize feelings.               This document has been electronically signed by TANMAY Min   July 11, 2019 10:11 AM

## 2019-07-18 ENCOUNTER — OFFICE VISIT (OUTPATIENT)
Dept: FAMILY MEDICINE CLINIC | Facility: CLINIC | Age: 54
End: 2019-07-18

## 2019-07-18 VITALS
SYSTOLIC BLOOD PRESSURE: 122 MMHG | WEIGHT: 134 LBS | HEIGHT: 62 IN | TEMPERATURE: 97.7 F | OXYGEN SATURATION: 98 % | DIASTOLIC BLOOD PRESSURE: 78 MMHG | HEART RATE: 97 BPM | BODY MASS INDEX: 24.66 KG/M2

## 2019-07-18 DIAGNOSIS — Z23 NEED FOR HEPATITIS A IMMUNIZATION: ICD-10-CM

## 2019-07-18 DIAGNOSIS — E78.2 MIXED HYPERLIPIDEMIA: Primary | ICD-10-CM

## 2019-07-18 PROCEDURE — 90471 IMMUNIZATION ADMIN: CPT | Performed by: FAMILY MEDICINE

## 2019-07-18 PROCEDURE — 99213 OFFICE O/P EST LOW 20 MIN: CPT | Performed by: FAMILY MEDICINE

## 2019-07-18 PROCEDURE — 90632 HEPA VACCINE ADULT IM: CPT | Performed by: FAMILY MEDICINE

## 2019-07-18 RX ORDER — ROSUVASTATIN CALCIUM 10 MG/1
10 TABLET, COATED ORAL DAILY
Qty: 30 TABLET | Refills: 6 | Status: SHIPPED | OUTPATIENT
Start: 2019-07-18 | End: 2020-01-21 | Stop reason: SINTOL

## 2019-07-18 NOTE — PROGRESS NOTES
Subjective   Nicole Cannon is a 53 y.o. female.     History of Present Illness follow-up regarding dyslipidemia.  Medication management.  Globally having no new problems.  Utilizing all medications as reconciled.  The outbreak on face has essentially resolved.    The following portions of the patient's history were reviewed and updated as appropriate: allergies, past family history, past medical history, past social history, past surgical history and problem list.    Review of Systems  See history of Present Illness     Objective     Physical Exam   Constitutional: She is oriented to person, place, and time. She appears well-developed.   Neurological: She is alert and oriented to person, place, and time.   Psychiatric: She has a normal mood and affect.       PHQ-9 Total Score:      Patient's Body mass index is 24.5 kg/m². BMI is within normal parameters. No follow-up required..   (Normal BMI:  18.5-24.9, OW 25-29.9, Obesity 30 or greater)      Assessment/Plan     Nicole was seen today for blood work review and hyperlipidemia.    Diagnoses and all orders for this visit:    Mixed hyperlipidemia  -     rosuvastatin (CRESTOR) 10 MG tablet; Take 1 tablet by mouth Daily.  -     Comprehensive Metabolic Panel; Future  -     Lipid Panel; Future    Need for hepatitis A immunization  -     Hepatitis A Vaccine Adult (VAQTA) - Today  -     Hepatitis A Vaccine Adult  (VAQTA) - Dose 2; Future    Counseled.  Will initiate statin.  Recheck at next scheduled visit.  Will initiate hepatitis a vaccine series.  Counseled regarding TLC.  Heart healthy diet regular activity.  Recheck in 4 months as scheduled.                     This document has been electronically signed by Erickson Lira MD   July 18, 2019 3:05 PM

## 2020-01-21 ENCOUNTER — OFFICE VISIT (OUTPATIENT)
Dept: FAMILY MEDICINE CLINIC | Facility: CLINIC | Age: 55
End: 2020-01-21

## 2020-01-21 VITALS
WEIGHT: 130.2 LBS | TEMPERATURE: 98 F | HEART RATE: 108 BPM | SYSTOLIC BLOOD PRESSURE: 140 MMHG | BODY MASS INDEX: 23.96 KG/M2 | OXYGEN SATURATION: 97 % | HEIGHT: 62 IN | DIASTOLIC BLOOD PRESSURE: 82 MMHG

## 2020-01-21 DIAGNOSIS — F41.1 GENERALIZED ANXIETY DISORDER: Primary | ICD-10-CM

## 2020-01-21 DIAGNOSIS — Z23 NEED FOR HEPATITIS A IMMUNIZATION: ICD-10-CM

## 2020-01-21 DIAGNOSIS — E78.2 MIXED HYPERLIPIDEMIA: ICD-10-CM

## 2020-01-21 PROCEDURE — 99214 OFFICE O/P EST MOD 30 MIN: CPT | Performed by: FAMILY MEDICINE

## 2020-01-21 PROCEDURE — 90471 IMMUNIZATION ADMIN: CPT | Performed by: FAMILY MEDICINE

## 2020-01-21 PROCEDURE — 90632 HEPA VACCINE ADULT IM: CPT | Performed by: FAMILY MEDICINE

## 2020-01-21 RX ORDER — SIMVASTATIN 20 MG
20 TABLET ORAL NIGHTLY
Qty: 30 TABLET | Refills: 6 | Status: SHIPPED | OUTPATIENT
Start: 2020-01-21 | End: 2020-08-10 | Stop reason: SDUPTHER

## 2020-01-21 RX ORDER — VENLAFAXINE HYDROCHLORIDE 75 MG/1
75 CAPSULE, EXTENDED RELEASE ORAL DAILY
Qty: 30 CAPSULE | Refills: 6 | Status: SHIPPED | OUTPATIENT
Start: 2020-01-21 | End: 2020-08-10 | Stop reason: SDUPTHER

## 2020-01-21 RX ORDER — LORAZEPAM 0.5 MG/1
0.5 TABLET ORAL 2 TIMES DAILY PRN
Qty: 60 TABLET | Refills: 3 | Status: SHIPPED | OUTPATIENT
Start: 2020-01-21 | End: 2020-05-11 | Stop reason: SDUPTHER

## 2020-01-21 NOTE — PROGRESS NOTES
Subjective   Nicole Cannon is a 54 y.o. female.     History of Present Illness follow-up medication management for the anxiety.  Globally doing well.  Feels like the Effexor is beneficial.  Feels like did better early on and has seemed to plateau from its benefit.  No adverse effects perceived.  Has had transient episodic right ear discomfort for a couple of months.  Denies respiratory CDV GI .  Anticipating another grandchild soon.  Was intolerant to the Crestor.    The following portions of the patient's history were reviewed and updated as appropriate: allergies, current medications, past medical history, past social history, past surgical history and problem list.    Review of Systems  See history of Present Illness     Objective     Physical Exam   Constitutional: She is oriented to person, place, and time. She appears well-developed.   HENT:   Head: Normocephalic.   Right Ear: External ear normal.   Left Ear: External ear normal.   Eyes: Conjunctivae are normal.   Neck: Normal range of motion. Neck supple.   Cardiovascular: Normal rate, regular rhythm and normal heart sounds.   Pulmonary/Chest: Effort normal and breath sounds normal.   Neurological: She is alert and oriented to person, place, and time.   Psychiatric: She has a normal mood and affect.       PHQ-9 Total Score: 0    Patient's Body mass index is 23.81 kg/m². BMI is within normal parameters. No follow-up required..   (Normal BMI:  18.5-24.9, OW 25-29.9, Obesity 30 or greater)      Assessment/Plan     Nicole was seen today for earache and hyperlipidemia.    Diagnoses and all orders for this visit:    Generalized anxiety disorder  -     LORazepam (ATIVAN) 0.5 MG tablet; Take 1 tablet by mouth 2 (Two) Times a Day As Needed for Anxiety.  -     venlafaxine XR (EFFEXOR-XR) 75 MG 24 hr capsule; Take 1 capsule by mouth Daily.    Mixed hyperlipidemia  -     simvastatin (ZOCOR) 20 MG tablet; Take 1 tablet by mouth Every Night.    Need for hepatitis A  immunization  -     Hepatitis A Vaccine Adult  (VAQTA) - Dose 2    Overall you seem to be doing quite well.  We will renew the lorazepam.  Increase the venlafaxine.  We will try simvastatin.  Second hepatitis A vaccine given.  Work more aggressively on diet although you have obviously had some success with weight loss.  Stay safely active.  Recheck in about 4 months.  Will want to monitor metabolically at that time.    As part of this patient's treatment plan I am prescribing controlled substances. The patient has been made aware of appropriate use of such medications, including potential risk of somnolence, limited ability to drive and/or work safely, and potential for dependence or overdose. It has also been made clear that these medications are for use by this patient only, without concomitant use of alcohol or other substances unless prescribed.  Patient has completed prescribing agreement detailing terms of continued prescribing of controlled substances, including monitoring JINNY reports, urine drug screening, and pill counts if necessary. The patient is aware that inappropriate use will results in cessation of prescribing such medications.  JINNY report has been reviewed.  JINNY is updated every 3 months.  History and physical exam exhibit continued safe and appropriate use of controlled substances.                      This document has been electronically signed by Erickson Lira MD   January 21, 2020 5:38 PM

## 2020-05-11 DIAGNOSIS — F41.1 GENERALIZED ANXIETY DISORDER: ICD-10-CM

## 2020-05-11 RX ORDER — LORAZEPAM 0.5 MG/1
0.5 TABLET ORAL 2 TIMES DAILY PRN
Qty: 60 TABLET | Refills: 3 | Status: SHIPPED | OUTPATIENT
Start: 2020-05-11 | End: 2020-07-20 | Stop reason: SDUPTHER

## 2020-05-11 NOTE — TELEPHONE ENCOUNTER
Pt called to request a refill of     LORazepam (ATIVAN) 0.5 MG tablet    [facility]IN  PH: [phone]  FAX: [phone]

## 2020-07-20 DIAGNOSIS — F41.1 GENERALIZED ANXIETY DISORDER: ICD-10-CM

## 2020-07-20 NOTE — TELEPHONE ENCOUNTER
PT WAS GOING TO VISIT DAUGHTER (BIRTH OF [place])  OUT OF TOWN AND IT WAS TOO EARLY TO REFILL HER LORAZEPAM SO SHE HAD HER PRESCRIPTION TRANSFERRED OUT TO A [facility] IN [place] ON 6/22/20.  [facility] COULD NOT ACCEPT A TRANSFER BACK IN MAKING LAST RX VOID WITH THEM SO THE PATIENT WANTS A NEW RX SENT TO [facility]. LAST WRITTEN ON 2/11/209 [id number]

## 2020-07-21 RX ORDER — LORAZEPAM 0.5 MG/1
0.5 TABLET ORAL 2 TIMES DAILY PRN
Qty: 60 TABLET | Refills: 3 | Status: SHIPPED | OUTPATIENT
Start: 2020-07-21 | End: 2020-11-13 | Stop reason: SDUPTHER

## 2020-08-10 DIAGNOSIS — E78.2 MIXED HYPERLIPIDEMIA: ICD-10-CM

## 2020-08-10 DIAGNOSIS — F41.1 GENERALIZED ANXIETY DISORDER: ICD-10-CM

## 2020-08-10 RX ORDER — VENLAFAXINE HYDROCHLORIDE 75 MG/1
75 CAPSULE, EXTENDED RELEASE ORAL DAILY
Qty: 30 CAPSULE | Refills: 6 | Status: SHIPPED | OUTPATIENT
Start: 2020-08-10 | End: 2021-03-09 | Stop reason: SDUPTHER

## 2020-08-10 RX ORDER — SIMVASTATIN 20 MG
20 TABLET ORAL NIGHTLY
Qty: 30 TABLET | Refills: 6 | Status: SHIPPED | OUTPATIENT
Start: 2020-08-10 | End: 2021-01-01 | Stop reason: HOSPADM

## 2020-08-10 NOTE — TELEPHONE ENCOUNTER
Last filled:   Simvastatin 1/21/2020 refill 6   Venlafaxine 1/21/2020 refill 6     Last seen: 1-21-20

## 2020-11-13 DIAGNOSIS — F41.1 GENERALIZED ANXIETY DISORDER: ICD-10-CM

## 2020-11-13 RX ORDER — LORAZEPAM 0.5 MG/1
0.5 TABLET ORAL 2 TIMES DAILY PRN
Qty: 60 TABLET | Refills: 3 | Status: SHIPPED | OUTPATIENT
Start: 2020-11-13 | End: 2021-03-09 | Stop reason: SDUPTHER

## 2020-11-13 RX ORDER — LORAZEPAM 0.5 MG/1
0.5 TABLET ORAL 2 TIMES DAILY PRN
Qty: 60 TABLET | Refills: 3 | Status: SHIPPED | OUTPATIENT
Start: 2020-11-13 | End: 2020-11-13 | Stop reason: SDUPTHER

## 2020-11-13 NOTE — TELEPHONE ENCOUNTER
Caller: Nicole Cannon    Relationship: Self    Best call back number: [phone]    Medication needed:   Requested Prescriptions     Pending Prescriptions Disp Refills   • LORazepam (ATIVAN) 0.5 MG tablet 60 tablet 3     Sig: Take 1 tablet by mouth 2 (Two) Times a Day As Needed for Anxiety.       When do you need the refill by: 11/13/20    What details did the patient provide when requesting the medication: patient has 2 days left    Does the patient have less than a 3 day supply:  [x] Yes  [] No    What is the patient's preferred pharmacy: [facility] [address] AT [address] [place] [address] [phone] [facility] [phone] FX

## 2020-12-31 ENCOUNTER — HOSPITAL ENCOUNTER (OUTPATIENT)
Facility: HOSPITAL | Age: 55
Setting detail: OBSERVATION
Discharge: HOME | End: 2021-01-01
Attending: EMERGENCY MEDICINE | Admitting: STUDENT IN AN ORGANIZED HEALTH CARE EDUCATION/TRAINING PROGRAM

## 2020-12-31 ENCOUNTER — APPOINTMENT (OUTPATIENT)
Dept: GENERAL RADIOLOGY | Facility: HOSPITAL | Age: 55
End: 2020-12-31

## 2020-12-31 DIAGNOSIS — I47.1 SVT (SUPRAVENTRICULAR TACHYCARDIA) (HCC): Primary | ICD-10-CM

## 2020-12-31 DIAGNOSIS — R77.8 ELEVATED TROPONIN: ICD-10-CM

## 2020-12-31 PROBLEM — I47.10 SVT (SUPRAVENTRICULAR TACHYCARDIA): Status: ACTIVE | Noted: 2020-12-31

## 2020-12-31 LAB
ALBUMIN SERPL-MCNC: 4.8 G/DL (ref 3.5–5.2)
ALBUMIN/GLOB SERPL: 1.5 G/DL
ALP SERPL-CCNC: 103 U/L (ref 39–117)
ALT SERPL W P-5'-P-CCNC: 51 U/L (ref 1–33)
ANION GAP SERPL CALCULATED.3IONS-SCNC: 13.8 MMOL/L (ref 5–15)
APTT PPP: 34.9 SECONDS (ref 25.6–35.3)
AST SERPL-CCNC: 50 U/L (ref 1–32)
BASOPHILS # BLD AUTO: 0.07 10*3/MM3 (ref 0–0.2)
BASOPHILS NFR BLD AUTO: 0.6 % (ref 0–1.5)
BILIRUB SERPL-MCNC: 0.2 MG/DL (ref 0–1.2)
BUN SERPL-MCNC: 9 MG/DL (ref 6–20)
BUN/CREAT SERPL: 11.4 (ref 7–25)
CALCIUM SPEC-SCNC: 10 MG/DL (ref 8.6–10.5)
CHLORIDE SERPL-SCNC: 105 MMOL/L (ref 98–107)
CO2 SERPL-SCNC: 22.2 MMOL/L (ref 22–29)
CREAT SERPL-MCNC: 0.79 MG/DL (ref 0.57–1)
DEPRECATED RDW RBC AUTO: 39.5 FL (ref 37–54)
EOSINOPHIL # BLD AUTO: 0.04 10*3/MM3 (ref 0–0.4)
EOSINOPHIL NFR BLD AUTO: 0.3 % (ref 0.3–6.2)
ERYTHROCYTE [DISTWIDTH] IN BLOOD BY AUTOMATED COUNT: 11.8 % (ref 12.3–15.4)
FLUAV RNA RESP QL NAA+PROBE: NOT DETECTED
FLUBV RNA RESP QL NAA+PROBE: NOT DETECTED
GFR SERPL CREATININE-BSD FRML MDRD: 76 ML/MIN/1.73
GLOBULIN UR ELPH-MCNC: 3.2 GM/DL
GLUCOSE SERPL-MCNC: 147 MG/DL (ref 65–99)
HCT VFR BLD AUTO: 46.6 % (ref 34–46.6)
HGB BLD-MCNC: 15.8 G/DL (ref 12–15.9)
HOLD SPECIMEN: NORMAL
IMM GRANULOCYTES # BLD AUTO: 0.04 10*3/MM3 (ref 0–0.05)
IMM GRANULOCYTES NFR BLD AUTO: 0.3 % (ref 0–0.5)
INR PPP: 0.95 (ref 0.9–1.1)
LYMPHOCYTES # BLD AUTO: 3.53 10*3/MM3 (ref 0.7–3.1)
LYMPHOCYTES NFR BLD AUTO: 29.6 % (ref 19.6–45.3)
MAGNESIUM SERPL-MCNC: 1.8 MG/DL (ref 1.6–2.6)
MCH RBC QN AUTO: 30.9 PG (ref 26.6–33)
MCHC RBC AUTO-ENTMCNC: 33.9 G/DL (ref 31.5–35.7)
MCV RBC AUTO: 91 FL (ref 79–97)
MONOCYTES # BLD AUTO: 0.66 10*3/MM3 (ref 0.1–0.9)
MONOCYTES NFR BLD AUTO: 5.5 % (ref 5–12)
NEUTROPHILS NFR BLD AUTO: 63.7 % (ref 42.7–76)
NEUTROPHILS NFR BLD AUTO: 7.58 10*3/MM3 (ref 1.7–7)
NRBC BLD AUTO-RTO: 0 /100 WBC (ref 0–0.2)
PLATELET # BLD AUTO: 320 10*3/MM3 (ref 140–450)
PMV BLD AUTO: 9.3 FL (ref 6–12)
POTASSIUM SERPL-SCNC: 4.7 MMOL/L (ref 3.5–5.2)
PROT SERPL-MCNC: 8 G/DL (ref 6–8.5)
PROTHROMBIN TIME: 12.4 SECONDS (ref 11.9–14.1)
QT INTERVAL: 224 MS
QT INTERVAL: 290 MS
QT INTERVAL: 360 MS
QTC INTERVAL: 403 MS
QTC INTERVAL: 428 MS
QTC INTERVAL: 459 MS
RBC # BLD AUTO: 5.12 10*6/MM3 (ref 3.77–5.28)
SARS-COV-2 RNA RESP QL NAA+PROBE: NOT DETECTED
SODIUM SERPL-SCNC: 141 MMOL/L (ref 136–145)
T4 FREE SERPL-MCNC: 0.9 NG/DL (ref 0.93–1.7)
TROPONIN T SERPL-MCNC: 0.04 NG/ML (ref 0–0.03)
TROPONIN T SERPL-MCNC: 0.07 NG/ML (ref 0–0.03)
TSH SERPL DL<=0.05 MIU/L-ACNC: 2.89 UIU/ML (ref 0.27–4.2)
WBC # BLD AUTO: 11.92 10*3/MM3 (ref 3.4–10.8)
WHOLE BLOOD HOLD SPECIMEN: NORMAL
WHOLE BLOOD HOLD SPECIMEN: NORMAL

## 2020-12-31 PROCEDURE — 25010000002 INFLUENZA VAC SPLIT QUAD 0.5 ML SUSPENSION PREFILLED SYRINGE: Performed by: STUDENT IN AN ORGANIZED HEALTH CARE EDUCATION/TRAINING PROGRAM

## 2020-12-31 PROCEDURE — 84443 ASSAY THYROID STIM HORMONE: CPT | Performed by: EMERGENCY MEDICINE

## 2020-12-31 PROCEDURE — 99244 OFF/OP CNSLTJ NEW/EST MOD 40: CPT | Performed by: NURSE PRACTITIONER

## 2020-12-31 PROCEDURE — 80053 COMPREHEN METABOLIC PANEL: CPT | Performed by: EMERGENCY MEDICINE

## 2020-12-31 PROCEDURE — 96374 THER/PROPH/DIAG INJ IV PUSH: CPT

## 2020-12-31 PROCEDURE — 25010000002 ENOXAPARIN PER 10 MG: Performed by: STUDENT IN AN ORGANIZED HEALTH CARE EDUCATION/TRAINING PROGRAM

## 2020-12-31 PROCEDURE — 84484 ASSAY OF TROPONIN QUANT: CPT | Performed by: EMERGENCY MEDICINE

## 2020-12-31 PROCEDURE — G0378 HOSPITAL OBSERVATION PER HR: HCPCS

## 2020-12-31 PROCEDURE — 93010 ELECTROCARDIOGRAM REPORT: CPT | Performed by: INTERNAL MEDICINE

## 2020-12-31 PROCEDURE — 25010000002 ADENOSINE PER 6 MG

## 2020-12-31 PROCEDURE — 71045 X-RAY EXAM CHEST 1 VIEW: CPT | Performed by: RADIOLOGY

## 2020-12-31 PROCEDURE — 85025 COMPLETE CBC W/AUTO DIFF WBC: CPT | Performed by: EMERGENCY MEDICINE

## 2020-12-31 PROCEDURE — G0008 ADMIN INFLUENZA VIRUS VAC: HCPCS | Performed by: STUDENT IN AN ORGANIZED HEALTH CARE EDUCATION/TRAINING PROGRAM

## 2020-12-31 PROCEDURE — 85730 THROMBOPLASTIN TIME PARTIAL: CPT | Performed by: EMERGENCY MEDICINE

## 2020-12-31 PROCEDURE — 84439 ASSAY OF FREE THYROXINE: CPT | Performed by: EMERGENCY MEDICINE

## 2020-12-31 PROCEDURE — 96372 THER/PROPH/DIAG INJ SC/IM: CPT

## 2020-12-31 PROCEDURE — 90686 IIV4 VACC NO PRSV 0.5 ML IM: CPT | Performed by: STUDENT IN AN ORGANIZED HEALTH CARE EDUCATION/TRAINING PROGRAM

## 2020-12-31 PROCEDURE — 85610 PROTHROMBIN TIME: CPT | Performed by: EMERGENCY MEDICINE

## 2020-12-31 PROCEDURE — C9803 HOPD COVID-19 SPEC COLLECT: HCPCS

## 2020-12-31 PROCEDURE — 87636 SARSCOV2 & INF A&B AMP PRB: CPT | Performed by: EMERGENCY MEDICINE

## 2020-12-31 PROCEDURE — 99285 EMERGENCY DEPT VISIT HI MDM: CPT

## 2020-12-31 PROCEDURE — 71045 X-RAY EXAM CHEST 1 VIEW: CPT

## 2020-12-31 PROCEDURE — 94799 UNLISTED PULMONARY SVC/PX: CPT

## 2020-12-31 PROCEDURE — 96361 HYDRATE IV INFUSION ADD-ON: CPT

## 2020-12-31 PROCEDURE — 93005 ELECTROCARDIOGRAM TRACING: CPT | Performed by: EMERGENCY MEDICINE

## 2020-12-31 PROCEDURE — 83735 ASSAY OF MAGNESIUM: CPT | Performed by: EMERGENCY MEDICINE

## 2020-12-31 PROCEDURE — 99219 PR INITIAL OBSERVATION CARE/DAY 50 MINUTES: CPT | Performed by: STUDENT IN AN ORGANIZED HEALTH CARE EDUCATION/TRAINING PROGRAM

## 2020-12-31 RX ORDER — ADENOSINE 3 MG/ML
6 INJECTION, SOLUTION INTRAVENOUS ONCE
Status: COMPLETED | OUTPATIENT
Start: 2020-12-31 | End: 2020-12-31

## 2020-12-31 RX ORDER — VENLAFAXINE HYDROCHLORIDE 75 MG/1
75 CAPSULE, EXTENDED RELEASE ORAL DAILY
Status: DISCONTINUED | OUTPATIENT
Start: 2021-01-01 | End: 2021-01-01 | Stop reason: HOSPADM

## 2020-12-31 RX ORDER — ASPIRIN 81 MG/1
324 TABLET, CHEWABLE ORAL ONCE
Status: COMPLETED | OUTPATIENT
Start: 2020-12-31 | End: 2020-12-31

## 2020-12-31 RX ORDER — LORAZEPAM 0.5 MG/1
0.5 TABLET ORAL 2 TIMES DAILY PRN
Status: DISCONTINUED | OUTPATIENT
Start: 2020-12-31 | End: 2021-01-01 | Stop reason: HOSPADM

## 2020-12-31 RX ORDER — ATORVASTATIN CALCIUM 20 MG/1
20 TABLET, FILM COATED ORAL NIGHTLY
Status: DISCONTINUED | OUTPATIENT
Start: 2020-12-31 | End: 2021-01-01 | Stop reason: HOSPADM

## 2020-12-31 RX ORDER — VENLAFAXINE HYDROCHLORIDE 75 MG/1
75 CAPSULE, EXTENDED RELEASE ORAL DAILY
Status: CANCELLED | OUTPATIENT
Start: 2021-01-01

## 2020-12-31 RX ORDER — ASPIRIN 81 MG/1
81 TABLET, CHEWABLE ORAL DAILY
Status: DISCONTINUED | OUTPATIENT
Start: 2020-12-31 | End: 2021-01-01

## 2020-12-31 RX ORDER — SODIUM CHLORIDE 0.9 % (FLUSH) 0.9 %
10 SYRINGE (ML) INJECTION EVERY 12 HOURS SCHEDULED
Status: DISCONTINUED | OUTPATIENT
Start: 2020-12-31 | End: 2021-01-01 | Stop reason: HOSPADM

## 2020-12-31 RX ORDER — ADENOSINE 3 MG/ML
INJECTION, SOLUTION INTRAVENOUS
Status: COMPLETED
Start: 2020-12-31 | End: 2020-12-31

## 2020-12-31 RX ORDER — LORAZEPAM 0.5 MG/1
0.5 TABLET ORAL 2 TIMES DAILY PRN
Status: CANCELLED | OUTPATIENT
Start: 2020-12-31

## 2020-12-31 RX ORDER — SODIUM CHLORIDE 0.9 % (FLUSH) 0.9 %
10 SYRINGE (ML) INJECTION AS NEEDED
Status: DISCONTINUED | OUTPATIENT
Start: 2020-12-31 | End: 2021-01-01 | Stop reason: HOSPADM

## 2020-12-31 RX ORDER — NITROGLYCERIN 0.4 MG/1
0.4 TABLET SUBLINGUAL
Status: DISCONTINUED | OUTPATIENT
Start: 2020-12-31 | End: 2021-01-01 | Stop reason: HOSPADM

## 2020-12-31 RX ORDER — SODIUM CHLORIDE 9 MG/ML
125 INJECTION, SOLUTION INTRAVENOUS CONTINUOUS
Status: DISCONTINUED | OUTPATIENT
Start: 2020-12-31 | End: 2021-01-01 | Stop reason: HOSPADM

## 2020-12-31 RX ADMIN — SODIUM CHLORIDE, PRESERVATIVE FREE 10 ML: 5 INJECTION INTRAVENOUS at 20:36

## 2020-12-31 RX ADMIN — ADENOSINE 6 MG: 3 INJECTION, SOLUTION INTRAVENOUS at 11:19

## 2020-12-31 RX ADMIN — INFLUENZA VIRUS VACCINE 0.5 ML: 15; 15; 15; 15 SUSPENSION INTRAMUSCULAR at 20:25

## 2020-12-31 RX ADMIN — ASPIRIN 324 MG: 81 TABLET, CHEWABLE ORAL at 15:38

## 2020-12-31 RX ADMIN — SODIUM CHLORIDE 125 ML/HR: 9 INJECTION, SOLUTION INTRAVENOUS at 12:07

## 2020-12-31 RX ADMIN — ATORVASTATIN CALCIUM 20 MG: 20 TABLET, FILM COATED ORAL at 21:53

## 2020-12-31 RX ADMIN — SODIUM CHLORIDE 500 ML: 9 INJECTION, SOLUTION INTRAVENOUS at 11:49

## 2020-12-31 RX ADMIN — ENOXAPARIN SODIUM 40 MG: 40 INJECTION SUBCUTANEOUS at 16:58

## 2020-12-31 RX ADMIN — ASPIRIN 81 MG: 81 TABLET, CHEWABLE ORAL at 19:12

## 2021-01-01 ENCOUNTER — APPOINTMENT (OUTPATIENT)
Dept: NUCLEAR MEDICINE | Facility: HOSPITAL | Age: 56
End: 2021-01-01

## 2021-01-01 ENCOUNTER — APPOINTMENT (OUTPATIENT)
Dept: CARDIOLOGY | Facility: HOSPITAL | Age: 56
End: 2021-01-01

## 2021-01-01 ENCOUNTER — READMISSION MANAGEMENT (OUTPATIENT)
Dept: CALL CENTER | Facility: HOSPITAL | Age: 56
End: 2021-01-01

## 2021-01-01 VITALS
HEIGHT: 62 IN | WEIGHT: 153.4 LBS | HEART RATE: 87 BPM | TEMPERATURE: 97.8 F | DIASTOLIC BLOOD PRESSURE: 95 MMHG | RESPIRATION RATE: 20 BRPM | BODY MASS INDEX: 28.23 KG/M2 | OXYGEN SATURATION: 96 % | SYSTOLIC BLOOD PRESSURE: 155 MMHG

## 2021-01-01 LAB
ANION GAP SERPL CALCULATED.3IONS-SCNC: 10.4 MMOL/L (ref 5–15)
BASOPHILS # BLD AUTO: 0.04 10*3/MM3 (ref 0–0.2)
BASOPHILS NFR BLD AUTO: 0.6 % (ref 0–1.5)
BH CV ECHO MEAS - ACS: 1.3 CM
BH CV ECHO MEAS - AO MAX PG: 7.2 MMHG
BH CV ECHO MEAS - AO MEAN PG: 4 MMHG
BH CV ECHO MEAS - AO ROOT AREA (BSA CORRECTED): 1.6
BH CV ECHO MEAS - AO ROOT AREA: 6.2 CM^2
BH CV ECHO MEAS - AO ROOT DIAM: 2.8 CM
BH CV ECHO MEAS - AO V2 MAX: 134 CM/SEC
BH CV ECHO MEAS - AO V2 MEAN: 92.4 CM/SEC
BH CV ECHO MEAS - AO V2 VTI: 28.1 CM
BH CV ECHO MEAS - BSA(HAYCOCK): 1.8 M^2
BH CV ECHO MEAS - BSA: 1.7 M^2
BH CV ECHO MEAS - BZI_BMI: 28 KILOGRAMS/M^2
BH CV ECHO MEAS - BZI_METRIC_HEIGHT: 157.5 CM
BH CV ECHO MEAS - BZI_METRIC_WEIGHT: 69.4 KG
BH CV ECHO MEAS - EDV(CUBED): 42.9 ML
BH CV ECHO MEAS - EDV(MOD-SP4): 26.5 ML
BH CV ECHO MEAS - EDV(TEICH): 50.9 ML
BH CV ECHO MEAS - EF(CUBED): 75.2 %
BH CV ECHO MEAS - EF(MOD-SP4): 63.3 %
BH CV ECHO MEAS - EF(TEICH): 68.1 %
BH CV ECHO MEAS - ESV(CUBED): 10.6 ML
BH CV ECHO MEAS - ESV(MOD-SP4): 9.7 ML
BH CV ECHO MEAS - ESV(TEICH): 16.2 ML
BH CV ECHO MEAS - FS: 37.1 %
BH CV ECHO MEAS - IVS/LVPW: 1
BH CV ECHO MEAS - IVSD: 1.1 CM
BH CV ECHO MEAS - LA DIMENSION: 3.1 CM
BH CV ECHO MEAS - LA/AO: 1.1
BH CV ECHO MEAS - LV DIASTOLIC VOL/BSA (35-75): 15.5 ML/M^2
BH CV ECHO MEAS - LV MASS(C)D: 119 GRAMS
BH CV ECHO MEAS - LV MASS(C)DI: 69.8 GRAMS/M^2
BH CV ECHO MEAS - LV SYSTOLIC VOL/BSA (12-30): 5.7 ML/M^2
BH CV ECHO MEAS - LVIDD: 3.5 CM
BH CV ECHO MEAS - LVIDS: 2.2 CM
BH CV ECHO MEAS - LVLD AP4: 6.3 CM
BH CV ECHO MEAS - LVLS AP4: 5.8 CM
BH CV ECHO MEAS - LVOT AREA (M): 2.3 CM^2
BH CV ECHO MEAS - LVOT AREA: 2.3 CM^2
BH CV ECHO MEAS - LVOT DIAM: 1.7 CM
BH CV ECHO MEAS - LVPWD: 1.1 CM
BH CV ECHO MEAS - MV A MAX VEL: 88.1 CM/SEC
BH CV ECHO MEAS - MV E MAX VEL: 91.9 CM/SEC
BH CV ECHO MEAS - MV E/A: 1
BH CV ECHO MEAS - PA ACC TIME: 0.14 SEC
BH CV ECHO MEAS - PA PR(ACCEL): 17.4 MMHG
BH CV ECHO MEAS - RAP SYSTOLE: 10 MMHG
BH CV ECHO MEAS - RVSP: 21.4 MMHG
BH CV ECHO MEAS - SI(AO): 101.4 ML/M^2
BH CV ECHO MEAS - SI(CUBED): 18.9 ML/M^2
BH CV ECHO MEAS - SI(MOD-SP4): 9.8 ML/M^2
BH CV ECHO MEAS - SI(TEICH): 20.3 ML/M^2
BH CV ECHO MEAS - SV(AO): 173 ML
BH CV ECHO MEAS - SV(CUBED): 32.2 ML
BH CV ECHO MEAS - SV(MOD-SP4): 16.8 ML
BH CV ECHO MEAS - SV(TEICH): 34.7 ML
BH CV ECHO MEAS - TR MAX VEL: 169 CM/SEC
BH CV NUCLEAR PRIOR STUDY: 3
BH CV STRESS BP STAGE 1: NORMAL
BH CV STRESS BP STAGE 2: NORMAL
BH CV STRESS COMMENTS STAGE 1: NORMAL
BH CV STRESS COMMENTS STAGE 2: NORMAL
BH CV STRESS DOSE REGADENOSON STAGE 1: 0.4
BH CV STRESS DURATION MIN STAGE 1: 0
BH CV STRESS DURATION MIN STAGE 2: 4
BH CV STRESS DURATION SEC STAGE 1: 10
BH CV STRESS DURATION SEC STAGE 2: 0
BH CV STRESS HR STAGE 1: 115
BH CV STRESS HR STAGE 2: 105
BH CV STRESS PROTOCOL 1: NORMAL
BH CV STRESS RECOVERY BP: NORMAL MMHG
BH CV STRESS RECOVERY HR: 95 BPM
BH CV STRESS STAGE 1: 1
BH CV STRESS STAGE 2: 2
BUN SERPL-MCNC: 10 MG/DL (ref 6–20)
BUN/CREAT SERPL: 16.1 (ref 7–25)
CALCIUM SPEC-SCNC: 8.9 MG/DL (ref 8.6–10.5)
CHLORIDE SERPL-SCNC: 105 MMOL/L (ref 98–107)
CO2 SERPL-SCNC: 22.6 MMOL/L (ref 22–29)
CREAT SERPL-MCNC: 0.62 MG/DL (ref 0.57–1)
DEPRECATED RDW RBC AUTO: 41 FL (ref 37–54)
EOSINOPHIL # BLD AUTO: 0.07 10*3/MM3 (ref 0–0.4)
EOSINOPHIL NFR BLD AUTO: 1 % (ref 0.3–6.2)
ERYTHROCYTE [DISTWIDTH] IN BLOOD BY AUTOMATED COUNT: 11.9 % (ref 12.3–15.4)
GFR SERPL CREATININE-BSD FRML MDRD: 100 ML/MIN/1.73
GLUCOSE SERPL-MCNC: 108 MG/DL (ref 65–99)
HCT VFR BLD AUTO: 40.9 % (ref 34–46.6)
HGB BLD-MCNC: 13.2 G/DL (ref 12–15.9)
IMM GRANULOCYTES # BLD AUTO: 0.04 10*3/MM3 (ref 0–0.05)
IMM GRANULOCYTES NFR BLD AUTO: 0.6 % (ref 0–0.5)
LV EF NUC BP: 86 %
LYMPHOCYTES # BLD AUTO: 2.42 10*3/MM3 (ref 0.7–3.1)
LYMPHOCYTES NFR BLD AUTO: 34.8 % (ref 19.6–45.3)
MAXIMAL PREDICTED HEART RATE: 165 BPM
MAXIMAL PREDICTED HEART RATE: 165 BPM
MCH RBC QN AUTO: 30.3 PG (ref 26.6–33)
MCHC RBC AUTO-ENTMCNC: 32.3 G/DL (ref 31.5–35.7)
MCV RBC AUTO: 93.8 FL (ref 79–97)
MONOCYTES # BLD AUTO: 0.45 10*3/MM3 (ref 0.1–0.9)
MONOCYTES NFR BLD AUTO: 6.5 % (ref 5–12)
NEUTROPHILS NFR BLD AUTO: 3.94 10*3/MM3 (ref 1.7–7)
NEUTROPHILS NFR BLD AUTO: 56.5 % (ref 42.7–76)
NRBC BLD AUTO-RTO: 0 /100 WBC (ref 0–0.2)
PERCENT MAX PREDICTED HR: 66.06 %
PLATELET # BLD AUTO: 218 10*3/MM3 (ref 140–450)
PMV BLD AUTO: 9.6 FL (ref 6–12)
POTASSIUM SERPL-SCNC: 3.6 MMOL/L (ref 3.5–5.2)
RBC # BLD AUTO: 4.36 10*6/MM3 (ref 3.77–5.28)
SODIUM SERPL-SCNC: 138 MMOL/L (ref 136–145)
STRESS BASELINE BP: NORMAL MMHG
STRESS BASELINE HR: 89 BPM
STRESS PERCENT HR: 78 %
STRESS POST PEAK BP: NORMAL MMHG
STRESS POST PEAK HR: 109 BPM
STRESS TARGET HR: 140 BPM
STRESS TARGET HR: 140 BPM
TROPONIN T SERPL-MCNC: 0.02 NG/ML (ref 0–0.03)
WBC # BLD AUTO: 6.96 10*3/MM3 (ref 3.4–10.8)

## 2021-01-01 PROCEDURE — G0378 HOSPITAL OBSERVATION PER HR: HCPCS

## 2021-01-01 PROCEDURE — 93018 CV STRESS TEST I&R ONLY: CPT | Performed by: INTERNAL MEDICINE

## 2021-01-01 PROCEDURE — 78452 HT MUSCLE IMAGE SPECT MULT: CPT

## 2021-01-01 PROCEDURE — 25010000002 ONDANSETRON PER 1 MG: Performed by: NURSE PRACTITIONER

## 2021-01-01 PROCEDURE — 93306 TTE W/DOPPLER COMPLETE: CPT

## 2021-01-01 PROCEDURE — 93306 TTE W/DOPPLER COMPLETE: CPT | Performed by: INTERNAL MEDICINE

## 2021-01-01 PROCEDURE — 78452 HT MUSCLE IMAGE SPECT MULT: CPT | Performed by: INTERNAL MEDICINE

## 2021-01-01 PROCEDURE — 99214 OFFICE O/P EST MOD 30 MIN: CPT | Performed by: NURSE PRACTITIONER

## 2021-01-01 PROCEDURE — 93017 CV STRESS TEST TRACING ONLY: CPT

## 2021-01-01 PROCEDURE — 25010000002 REGADENOSON 0.4 MG/5ML SOLUTION: Performed by: STUDENT IN AN ORGANIZED HEALTH CARE EDUCATION/TRAINING PROGRAM

## 2021-01-01 PROCEDURE — 0 TECHNETIUM SESTAMIBI: Performed by: STUDENT IN AN ORGANIZED HEALTH CARE EDUCATION/TRAINING PROGRAM

## 2021-01-01 PROCEDURE — 96361 HYDRATE IV INFUSION ADD-ON: CPT

## 2021-01-01 PROCEDURE — 80048 BASIC METABOLIC PNL TOTAL CA: CPT | Performed by: STUDENT IN AN ORGANIZED HEALTH CARE EDUCATION/TRAINING PROGRAM

## 2021-01-01 PROCEDURE — 85025 COMPLETE CBC W/AUTO DIFF WBC: CPT | Performed by: STUDENT IN AN ORGANIZED HEALTH CARE EDUCATION/TRAINING PROGRAM

## 2021-01-01 PROCEDURE — 99217 PR OBSERVATION CARE DISCHARGE MANAGEMENT: CPT | Performed by: NURSE PRACTITIONER

## 2021-01-01 PROCEDURE — 84484 ASSAY OF TROPONIN QUANT: CPT | Performed by: NURSE PRACTITIONER

## 2021-01-01 PROCEDURE — A9500 TC99M SESTAMIBI: HCPCS | Performed by: STUDENT IN AN ORGANIZED HEALTH CARE EDUCATION/TRAINING PROGRAM

## 2021-01-01 PROCEDURE — 96375 TX/PRO/DX INJ NEW DRUG ADDON: CPT

## 2021-01-01 RX ORDER — ONDANSETRON 2 MG/ML
4 INJECTION INTRAMUSCULAR; INTRAVENOUS ONCE
Status: COMPLETED | OUTPATIENT
Start: 2021-01-01 | End: 2021-01-01

## 2021-01-01 RX ORDER — METOPROLOL SUCCINATE 50 MG/1
50 TABLET, EXTENDED RELEASE ORAL
Qty: 30 TABLET | Refills: 0 | Status: SHIPPED | OUTPATIENT
Start: 2021-01-01 | End: 2021-01-12 | Stop reason: SDUPTHER

## 2021-01-01 RX ORDER — ATORVASTATIN CALCIUM 20 MG/1
20 TABLET, FILM COATED ORAL NIGHTLY
Qty: 30 TABLET | Refills: 0 | Status: SHIPPED | OUTPATIENT
Start: 2021-01-01 | End: 2021-01-12 | Stop reason: SDUPTHER

## 2021-01-01 RX ORDER — METOPROLOL SUCCINATE 50 MG/1
50 TABLET, EXTENDED RELEASE ORAL
Status: DISCONTINUED | OUTPATIENT
Start: 2021-01-01 | End: 2021-01-01 | Stop reason: HOSPADM

## 2021-01-01 RX ORDER — ACETAMINOPHEN 325 MG/1
650 TABLET ORAL EVERY 6 HOURS PRN
Status: DISCONTINUED | OUTPATIENT
Start: 2021-01-01 | End: 2021-01-01 | Stop reason: HOSPADM

## 2021-01-01 RX ADMIN — SODIUM CHLORIDE, PRESERVATIVE FREE 10 ML: 5 INJECTION INTRAVENOUS at 11:55

## 2021-01-01 RX ADMIN — SODIUM CHLORIDE 125 ML/HR: 9 INJECTION, SOLUTION INTRAVENOUS at 01:04

## 2021-01-01 RX ADMIN — REGADENOSON 0.4 MG: 0.08 INJECTION, SOLUTION INTRAVENOUS at 10:05

## 2021-01-01 RX ADMIN — ONDANSETRON 4 MG: 2 INJECTION INTRAMUSCULAR; INTRAVENOUS at 11:46

## 2021-01-01 RX ADMIN — METOPROLOL SUCCINATE 50 MG: 50 TABLET, EXTENDED RELEASE ORAL at 11:46

## 2021-01-01 RX ADMIN — TECHNETIUM TC 99M SESTAMIBI 1 DOSE: 1 INJECTION INTRAVENOUS at 10:05

## 2021-01-01 RX ADMIN — VENLAFAXINE HYDROCHLORIDE 75 MG: 75 CAPSULE, EXTENDED RELEASE ORAL at 11:46

## 2021-01-01 RX ADMIN — ACETAMINOPHEN 650 MG: 325 TABLET ORAL at 11:46

## 2021-01-01 RX ADMIN — TECHNETIUM TC 99M SESTAMIBI 1 DOSE: 1 INJECTION INTRAVENOUS at 08:30

## 2021-01-01 NOTE — PLAN OF CARE
Goal Outcome Evaluation:   Patient resting in bed with no acute distress noted at this time. Patient A&Ox4 and has denied any pain this shift. Patient has denied any SOA this shift. Patient has been NPO since midnight for morning test. Will continue to monitor and follow plan of care with interventions implemented as needed.

## 2021-01-01 NOTE — PROGRESS NOTES
LOS: 0 days     Name: Nicole Cannon  Age/Sex: 55 y.o. female  :  1965        PCP: Erickson Lira MD  REF: No ref. provider found    Principal Problem:    SVT (supraventricular tachycardia) (CMS/HCC)      Reason for follow-up: PSVT and elevated troponin    Subjective       Subjective     Nicole Cannon is a 55-year-old female who presented to the ER with complaints of palpitations and was found to be in SVT with a rate in the 190s with successful conversion to normal sinus rhythm with IV adenosine.  Also noted to have elevated troponin.    Interval History: Patient scheduled for stress test today.  No recurrence of SVT overnight. She denies any palpitations, chest pain or shortness of breath. Vitals stable.       Vital Signs  Temp:  [97.5 °F (36.4 °C)-98 °F (36.7 °C)] 97.8 °F (36.6 °C)  Heart Rate:  [] 84  Resp:  [16-20] 20  BP: (108-167)/(73-98) 147/89     Vital Signs (last 72 hrs)        0700  -   0659  07  -   0659  07  -   0659  07  -   0843   Most Recent    Temp (°F)     97.5 -  98      97.8     97.8 (36.6)    Heart Rate     82 -  190      84     84    Resp     16 -  20      20     20    BP     108/85 -  167/98      147/89     147/89    SpO2 (%)     94 -  100      96     96        Body mass index is 28.06 kg/m².    Intake/Output Summary (Last 24 hours) at 2021 0843  Last data filed at 2021 0522  Gross per 24 hour   Intake 2893.9 ml   Output --   Net 2893.9 ml     Objective    Objective       Physical Exam:     General Appearance:    Alert, cooperative, in no acute distress   Head:    Normocephalic, without obvious abnormality, atraumatic   Eyes:            Conjunctivae and sclerae normal, no   icterus, no pallor, corneas clear.   Neck:   No adenopathy, supple, trachea midline, no thyromegaly, no   carotid bruit, no JVD   Lungs:     Clear to auscultation,respirations regular, even and                  unlabored    Heart:    Regular  rhythm and normal rate, normal S1 and S2, no            murmur, no gallop, no rub, no click   Chest Wall:    No abnormalities observed   Abdomen:     Normal bowel sounds, no masses, no organomegaly, soft        non-tender, non-distended, no guarding, no rebound                tenderness   Extremities:   Moves all extremities well, no edema, no cyanosis, no             redness   Pulses:   Pulses palpable and equal bilaterally   Skin:   No bleeding, bruising or rash       Neurologic:  Alert and oriented   I have seen and performed a physical examination today.     Results review       Results Review:   Results from last 7 days   Lab Units 01/01/21  0557 12/31/20  1122   WBC 10*3/mm3 6.96 11.92*   HEMOGLOBIN g/dL 13.2 15.8   PLATELETS 10*3/mm3 218 320     Results from last 7 days   Lab Units 01/01/21  0557 12/31/20  1122   SODIUM mmol/L 138 141   POTASSIUM mmol/L 3.6 4.7   CHLORIDE mmol/L 105 105   CO2 mmol/L 22.6 22.2   BUN mg/dL 10 9   CREATININE mg/dL 0.62 0.79   CALCIUM mg/dL 8.9 10.0   GLUCOSE mg/dL 108* 147*   ALT (SGPT) U/L  --  51*   AST (SGOT) U/L  --  50*     Results from last 7 days   Lab Units 12/31/20  1445 12/31/20  1122   TROPONIN T ng/mL 0.070* 0.044*     Lab Results   Component Value Date    INR 0.95 12/31/2020     Lab Results   Component Value Date    MG 1.8 12/31/2020     Lab Results   Component Value Date    TSH 2.890 12/31/2020    CHLPL 205 (H) 06/08/2015    TRIG 204 (H) 07/09/2019    HDL 50 07/09/2019     (H) 07/09/2019      Imaging Results (Last 48 Hours)     Procedure Component Value Units Date/Time    XR Chest 1 View [[id number]] Collected: 12/31/20 1238     Updated: 12/31/20 1240    Narrative:      XR CHEST 1 VW-     CLINICAL INDICATION: svt        COMPARISON: None available      TECHNIQUE: Single frontal view of the chest.     FINDINGS:     There is no focal alveolar infiltrate or effusion.  The cardiac silhouette is normal. The pulmonary vasculature is  unremarkable.  There is no  evidence of an acute osseous abnormality.   There are no suspicious-appearing parenchymal soft tissue nodules.          Impression:      No evidence of active or acute cardiopulmonary disease on today's chest  radiograph.     This report was finalized on 12/31/2020 12:38 PM by Dr. Albaro Barlow MD.            I reviewed the patient's new clinical results.    Telemetry: NSR 80's       Medication Review:   aspirin, 81 mg, Oral, Daily  atorvastatin, 20 mg, Oral, Nightly  enoxaparin, 40 mg, Subcutaneous, Q24H  sodium chloride, 10 mL, Intravenous, Q12H  venlafaxine XR, 75 mg, Oral, Daily        Pharmacy to Dose enoxaparin (LOVENOX),   sodium chloride, 125 mL/hr, Last Rate: 125 mL/hr (01/01/21 0526)        Assessment      Assessment:  1. PSVT, s/p adenosine X1 with conversion to normal sinus rhythm/sinus tachycardia, no recurrence  2. Elevated troponin likely type II, trending up to 0.070, EKG tracing reviewed and shows no acute ischemia, could be elevated secondary to PSVT  3. Hyperlipidemia, on statin     Plan     Recommendations:  1. SVT  · No reoccurrence of SVT overnight. Will add metoprolol succinate 50 mg PO daily to try and control episodes. If SVT continues to persist she may need a referral to EP in the future in the outpatient setting for possible ablation.     2. Elevated troponin  · Likely type II secondary to SVT however patient is scheduled today for a nuclear stress test to rule out ischemia. Will follow up on results. Echocardiogram is pending. Continue with aspirin and lipitor.     I discussed the patients findings and my recommendations with patient and family      Génesis TANMAY Freire  01/01/21  08:43 EST    Please note that portions of this note were completed with a voice recognition program.

## 2021-01-01 NOTE — DISCHARGE SUMMARY
[facility] Medicine Services  DISCHARGE SUMMARY    Date of Admission: 12/31/2020    Date of Discharge:  1/1/2021    PCP: Erickson Lira MD    Discharging Provider: TANMAY Delvalle  Attending Physician on day of DC: Dr. Barr.    Admission Diagnosis:   Please see admission H&P    Discharge Diagnosis:   · PSVT- now converted to normal sinus rhythm/sinus tachycardia.  · Elevated Troponin T, likely secondary to PSVT- now resolved.      Procedures Performed:  · Chest x-ray: No evidence of acute or active cardiopulmonary disease.    · Transthoracic echocardiogram complete: Left ventricular ejection fraction appears to be 56 to 60%.  Left ventricular systolic function is normal.  Left ventricular diastolic function is normal.    · Stress test with myocardial perfusion: Left ventricular ejection fraction is hyperdynamic with EF greater than 70%.  Myocardial perfusion imaging indicates a normal myocardial perfusion study with no evidence of ischemia.  Impressions are consistent with a low risk study.  Findings consistent with a normal ECG stress test.     Consults:   Consults     Date and Time Order Name Status Description    12/31/2020 1651 Cardiology (on-call MD unless specified)      12/31/2020 1651 Hospitalist (on-call MD unless specified)      12/31/2020 1613 Inpatient Cardiology Consult            HPI     History of Present Illness:  Nicole Cannon is a 55 y.o. female with a medical history of depression, anxiety and hyperlipidemia.  She presented to [facility] emergency department on December 31, 2020 with complaints of palpitations and rapid heart rate.  Patient reports that she was suddenly awoken on the morning of 12/31/2020 with feelings like her heart was racing.  Patient reports that she checked her her heart rate with an jennifer on her phone and her heart rate was 185.  ED work-up discovered patient's heart rate was 190 consistent with SVT.  Patient was given  one-time dose of IV adenosine and subsequently converted to normal sinus rhythm.  Troponin T found to be elevated at 0.044 with reflex 0.070, felt to be low likely secondary to PSVT.  Patient was admitted to [facility] for further evaluation and cardiology consult.    Hospital Course     Hospital Course  Nicole Cannon was admitted as outlined in above HPI.  Patient was evaluated by inpatient cardiology upon admission where transthoracic echocardiogram complete, stress test with myocardial perfusion and trending of troponins was ordered.  Troponin T trended down with most recent at 0.023, felt likely secondary to PSVT.  Echocardiogram shows EF 56 to 60% with left ventricular systolic and diastolic function normal.  Additionally, stress test with myocardial effusion was read as being consistent with a low risk study with no signs of ischemia.  On telemetry,  patient's heart rate has ranged from normal sinus rhythm to sinus tach in the low 100s at times, with no recurrence of SVT overnight.  Cardiology started patient on 50 mg oral metoprolol daily.  Patient also transitioned from simvastatin to Atorvastatin during admission and to be continued at discharge.  Discussed case with Génesis, nurse practitioner with cardiology who is in agreement with patient's discharge.  Patient to follow-up with PCP in 1 week and outpatient cardiology in 2 weeks.    Discussed with AM JOHN Hutton on day of discharge.     Telemetry on day of discharge Normal Sinus Rhythm 80-90.     Oxygen saturation on the day of discharge 98% on room air.      Pertinent Laboratory and Radiology Results     Pertinent Test Results:          Results from last 7 days   Lab Units 01/01/21  0557 12/31/20  1122   WBC 10*3/mm3 6.96 11.92*   HEMOGLOBIN g/dL 13.2 15.8   HEMATOCRIT % 40.9 46.6   PLATELETS 10*3/mm3 218 320   MCV fL 93.8 91.0   SODIUM mmol/L 138 141   POTASSIUM mmol/L 3.6 4.7   CHLORIDE mmol/L 105 105   CO2 mmol/L 22.6 22.2   BUN mg/dL 10 9    CREATININE mg/dL 0.62 0.79   GLUCOSE mg/dL 108* 147*   CALCIUM mg/dL 8.9 10.0        Results from last 7 days   Lab Units 01/01/21  0557 12/31/20  1445 12/31/20  1122   TROPONIN T ng/mL 0.023 0.070* 0.044*     Results from last 7 days   Lab Units 01/01/21  0557 12/31/20  1122   WBC 10*3/mm3 6.96 11.92*     Results from last 7 days   Lab Units 12/31/20  1122   BILIRUBIN mg/dL 0.2   ALK PHOS U/L 103   ALT (SGPT) U/L 51*   AST (SGOT) U/L 50*   PROTIME Seconds 12.4   INR  0.95   APTT seconds 34.9           Invalid input(s): TG, LDLCALC, LDLREALC  Results from last 7 days   Lab Units 12/31/20  1122   TSH uIU/mL 2.890     Brief Urine Lab Results     None             Results for orders placed during the hospital encounter of 12/31/20   Adult Transthoracic Echo Complete W/ Cont if Necessary Per Protocol    Narrative · Left ventricular ejection fraction appears to be 56 - 60%. Left   ventricular systolic function is normal.  · Left ventricular diastolic function was normal.            ----------------------------------------------------------------------------------------------------------------------  Xr Chest 1 View    Result Date: 12/31/2020  No evidence of active or acute cardiopulmonary disease on today's chest radiograph.  This report was finalized on 12/31/2020 12:38 PM by Dr. Albaro Barlow MD.          Microbiology Results (last 10 days)     Procedure Component Value - Date/Time    COVID-19 and FLU A/B PCR - Swab, Nasopharynx [[id number]]  (Normal) Collected: 12/31/20 1625    Lab Status: Final result Specimen: Swab from Nasopharynx Updated: 12/31/20 1650     COVID19 Not Detected     Influenza A PCR Not Detected     Influenza B PCR Not Detected    Narrative:      Fact sheet for providers: https://www.fda.gov/media/718424/download    Fact sheet for patients: https://www.fda.gov/media/241244/download    Test performed by PCR.            Discharge Vitals and Physical Examination       Vital Signs  Temp:  [97.5 °F (36.4  °C)-98 °F (36.7 °C)] 97.8 °F (36.6 °C)  Heart Rate:  [] 87  Resp:  [20] 20  BP: (108-167)/(74-98) 155/95     Physical Exam:  General:    Awake, alert, in no acute distress   Heart:      Normal S1 and S2. Regular rate and rhythm. No significant murmur, rubs or gallops appreciated.   Lungs:     Respirations regular, even and unlabored. Lungs clear to auscultation B/L. No wheezes, rales or rhonchi.   Abdomen:   Soft and nontender. No guarding, rebound tenderness or  organomegaly noted. Bowel sounds present x 4.   Extremities:  No clubbing, cyanosis or edema noted. Moves UE and LE equally B/L.         Discharge Disposition, Discharge Medications, and Discharge Appointments     Discharge Disposition:   home    Condition on Discharge:  Good    Discharge Medications:     Discharge Medications      New Medications      Instructions Start Date   atorvastatin 20 MG tablet  Commonly known as: LIPITOR   20 mg, Oral, Nightly      metoprolol succinate XL 50 MG 24 hr tablet  Commonly known as: TOPROL-XL   50 mg, Oral, Every 24 Hours Scheduled         Continue These Medications      Instructions Start Date   LORazepam 0.5 MG tablet  Commonly known as: ATIVAN   0.5 mg, Oral, 2 Times Daily PRN      venlafaxine XR 75 MG 24 hr capsule  Commonly known as: EFFEXOR-XR   75 mg, Oral, Daily         Stop These Medications    simvastatin 20 MG tablet  Commonly known as: ZOCOR          Discharged medication regimen discussed with attending physician prior to discharge.     Discharge Diet:   cardiac diet  Dietary Orders (From admission, onward)     Start     Ordered    01/01/21 1126  Diet Regular; Cardiac  Diet Effective Now     Question Answer Comment   Diet Texture / Consistency Regular    Common Modifiers Cardiac        01/01/21 1126                Activity at Discharge:  activity as tolerated    Discharge Disposition:    Home or Self Care    Follow-up Appointments:    Follow-up Information     Erickson Lira MD .     Specialty: Family Medicine  Contact information:  [address] [facility] [place] [address] [phone]             Damián Verdin MD Follow up in 2 week(s).    Specialties: Interventional Cardiology, Cardiology  Contact information:  [address]  [facility] [address]  [facility] [id number] [phone]                     Test Results Pending at Discharge:      Emmanuelle Cabrera [facility] Medicine Team  01/01/21  11:51 EST      Time: Greater than 30 minutes spent on this discharge.

## 2021-01-01 NOTE — PLAN OF CARE
Pt complaining of nausea and headache upon returning from stress test, Zofran and prn tylenol given. No s/s of distress. Will continue with plan of care. Will continue to monitor.

## 2021-01-01 NOTE — OUTREACH NOTE
Prep Survey      Responses   [facility] patient discharged from?  [facility]   Is LACE score < 7 ?  Yes   Emergency Room discharge w/ pulse ox?  No   Eligibility  [facility]   Date of Admission  12/31/20   Date of Discharge  01/01/21   Discharge Disposition  Home or Self Care   Discharge diagnosis  PSVT- now converted to normal sinus rhythm/sinus tachycardia   Does the patient have one of the following disease processes/diagnoses(primary or secondary)?  Other   Does the patient have Home health ordered?  No   Is there a DME ordered?  No   Prep survey completed?  Yes          Humaira Conner RN

## 2021-01-01 NOTE — DISCHARGE INSTR - APPOINTMENTS
Dr dr Erickson Lira  And  Dr Verdin  Office  Closed  New  Years  Day  Will call on  Monday January 4   And  Get  Apt and  Call pt

## 2021-01-01 NOTE — H&P
"    [facility] HOSPITALIST HISTORY AND PHYSICAL    Patient Identification:  Name:  Nicole Cannon  Age:  55 y.o.  Sex:  female  :  1965  MRN:  [id number]   Visit Number:  [id number]  Admit Date: 2020   Room number:  3311/1S  Primary Care Physician:  Erickson Lira MD     Subjective     Chief complaint:    Chief Complaint   Patient presents with   • Rapid Heart Rate       History of presenting illness:  55 y.o. female with past medical history of depression and anxiety and hyperlipidemia who presents to the hospital for palpitations and rapid heart rate.  Patient was feeling in her normal state of health when she suddenly awoke this morning feeling palpitations and like her heart was racing.  This feeling persisted until the time of her arrival at the ER.  Patient did check her heart rate with an jennifer on her phone and noted that her heart rate was 185.  Patient symptoms were not resolving on their own and she came to the ER for further evaluation.  Patient denies any chest pain, shortness of breath, dizziness or loss of consciousness, fevers or chills with her rapid heart rate.  She does report feeling \"weird \"in her extremities during her episode of elevated heart rate.  Patient denies any history of smoking, alcohol usage other than rare usage and denies any history of illicit drug usage.  Patient also denies any family history of heart disease.    In the ER patient was found to have heart rate in the 190s consistent with SVT.  Patient was given IV adenosine with conversion to normal sinus rhythm.  Of note patient did have initially elevated troponin of 0.044 followed by repeat of 0.070 roughly 2 to 3 hours after conversion.  The case was discussed with the on-call cardiologist who recommended that the patient be brought in for observation and stress testing.  The hospitalist service was then contacted for " admission.  ---------------------------------------------------------------------------------------------------------------------   Review of Systems a 10 system review of systems was performed with pertinent positives and negatives as above in the HPI.  ---------------------------------------------------------------------------------------------------------------------   Past Medical History:   Diagnosis Date   • CRPS (complex regional pain syndrome) type I of lower limb    • Dysfunctional uterine bleeding    • Dysmenorrhea    • Foot fracture    • Hand pain    • Itching    • Low back pain    • Lump of skin    • Seborrheic keratoses      No past surgical history on file.  Family History   Problem Relation Age of Onset   • Diabetes Mother    • Hyperlipidemia Mother    • Hypertension Mother    • Thyroid disease Mother    • Hyperlipidemia Father    • Cancer Father         PROSTATE     Social History     Socioeconomic History   • Marital status:      Spouse name: Not on file   • Number of children: Not on file   • Years of education: Not on file   • Highest education level: Not on file   Tobacco Use   • Smoking status: Never Smoker   • Smokeless tobacco: Never Used   Substance and Sexual Activity   • Alcohol use: No   • Drug use: No     ---------------------------------------------------------------------------------------------------------------------   Allergies:  Patient has no known allergies.  ---------------------------------------------------------------------------------------------------------------------   Medications below are reported home medications pulling from within the system; at this time, these medications have not been reconciled unless otherwise specified and are in the verification process for further verifcation as current home medications.    Prior to Admission Medications     Prescriptions Last Dose Informant Patient Reported? Taking?    LORazepam (ATIVAN) 0.5 MG tablet   No No    Take 1  tablet by mouth 2 (Two) Times a Day As Needed for Anxiety.    simvastatin (ZOCOR) 20 MG tablet   No No    Take 1 tablet by mouth Every Night.    venlafaxine XR (EFFEXOR-XR) 75 MG 24 hr capsule   No No    Take 1 capsule by mouth Daily.        Objective     Vital Signs:  Temp:  [97.9 °F (36.6 °C)-98 °F (36.7 °C)] 98 °F (36.7 °C)  Heart Rate:  [] 92  Resp:  [16-20] 20  BP: (108-167)/(73-98) 167/98    Mean Arterial Pressure (Non-Invasive) for the past 24 hrs (Last 3 readings):   Noninvasive MAP (mmHg)   12/31/20 1757 120   12/31/20 1702 122   12/31/20 1617 114     SpO2:  [95 %-100 %] 95 %  on  Flow (L/min):  [2] 2;   Device (Oxygen Therapy): room air  Body mass index is 28.06 kg/m².    Wt Readings from Last 3 Encounters:   12/31/20 69.6 kg (153 lb 6.4 oz)   01/21/20 59.1 kg (130 lb 3.2 oz)   07/18/19 60.8 kg (134 lb)      ---------------------------------------------------------------------------------------------------------------------   Physical Exam:  Constitutional:  Well-developed and well-nourished.  No respiratory distress.      HENT:  Head: Normocephalic and atraumatic.  Mouth:  Moist mucous membranes.    Eyes:  Conjunctivae and EOM are normal.  Pupils are equal, round, and reactive to light.  No scleral icterus.  Neck:  Neck supple.  No JVD present.    Cardiovascular:  Normal rate, regular rhythm and normal heart sounds with no murmur.  Pulmonary/Chest:  No respiratory distress, no wheezes, no crackles, with normal breath sounds and good air movement.  Abdominal:  Soft.  Bowel sounds are normal.  No distension and no tenderness.   Musculoskeletal:  No edema, no tenderness, and no deformity.  No red or swollen joints anywhere.    Neurological:  Alert and oriented to person, place, and time.  No cranial nerve deficit.  No tongue deviation.  No facial droop.  No slurred speech.   Skin:  Skin is warm and dry.  No rash noted.  No pallor.   Peripheral vascular:  No edema and pulses on all 4  extremities.  Genitourinary:  ---------------------------------------------------------------------------------------------------------------------  EKG:    ECG 12 Lead   Final Result   Test Reason : svt   Blood Pressure : **/** mmHG   Vent. Rate : 098 BPM     Atrial Rate : 098 BPM      P-R Int : 150 ms          QRS Dur : 072 ms       QT Int : 360 ms       P-R-T Axes : 063 033 044 degrees      QTc Int : 459 ms      Normal sinus rhythm   Normal ECG   When compared with ECG of 31-DEC-2020 11:23,   No significant change was found   Confirmed by LISET TRAN ([id number]) on 12/31/2020 5:19:40 PM      Referred By:  JOSHUA           Confirmed By:LISET TRAN      ECG 12 Lead   Final Result   Test Reason : tachycardia   Blood Pressure : **/** mmHG   Vent. Rate : 131 BPM     Atrial Rate : 131 BPM      P-R Int : 150 ms          QRS Dur : 064 ms       QT Int : 290 ms       P-R-T Axes : 065 033 058 degrees      QTc Int : 428 ms      Sinus tachycardia   Otherwise normal ECG   When compared with ECG of 31-DEC-2020 11:10, (Unconfirmed)   No significant change was found   Confirmed by LISET TRAN ([id number]) on 12/31/2020 1:34:04 PM      Referred By:  JOSHUA           Confirmed By:LISET TRAN      ECG 12 Lead   Final Result   Test Reason : svt   Blood Pressure : **/** mmHG   Vent. Rate : 195 BPM     Atrial Rate : 197 BPM      P-R Int : 000 ms          QRS Dur : 076 ms       QT Int : 224 ms       P-R-T Axes : 000 004 047 degrees      QTc Int : 403 ms      ** Critical Test Result: High HR   Supraventricular tachycardia   Nonspecific ST abnormality   Abnormal ECG   No previous ECGs available   Confirmed by LISET TRAN ([id number]) on 12/31/2020 1:33:40 PM      Referred By:  JOSHUA           Confirmed By:LISET TRAN          Telemetry:     I have personally looked at both the EKG and the telemetry strips.    Last echocardiogram:     --------------------------------------------------------------------------------------------------------------------  Labs:  Results from last  7 days   Lab Units 12/31/20  1122   WBC 10*3/mm3 11.92*   HEMOGLOBIN g/dL 15.8   HEMATOCRIT % 46.6   MCV fL 91.0   MCHC g/dL 33.9   PLATELETS 10*3/mm3 320   INR  0.95         Results from last 7 days   Lab Units 12/31/20  1122   SODIUM mmol/L 141   POTASSIUM mmol/L 4.7   MAGNESIUM mg/dL 1.8   CHLORIDE mmol/L 105   CO2 mmol/L 22.2   BUN mg/dL 9   CREATININE mg/dL 0.79   EGFR IF NONAFRICN AM mL/min/1.73 76   CALCIUM mg/dL 10.0   GLUCOSE mg/dL 147*   ALBUMIN g/dL 4.80   BILIRUBIN mg/dL 0.2   ALK PHOS U/L 103   AST (SGOT) U/L 50*   ALT (SGPT) U/L 51*   Estimated Creatinine Clearance: 73.5 mL/min (by C-G formula based on SCr of 0.79 mg/dL).    No results found for: AMMONIA  Results from last 7 days   Lab Units 12/31/20  1445 12/31/20  1122   TROPONIN T ng/mL 0.070* 0.044*         No results found for: HGBA1C, POCGLU  Lab Results   Component Value Date    TSH 2.890 12/31/2020    FREET4 0.90 (L) 12/31/2020     No results found for: PREGTESTUR, PREGSERUM, HCG, HCGQUANT  Pain Management Panel     There is no flowsheet data to display.        Brief Urine Lab Results     None        No results found for: BLOODCX  No results found for: URINECX  No results found for: WOUNDCX  No results found for: STOOLCX    I have personally looked at the labs and they are summarized above.  ----------------------------------------------------------------------------------------------------------------------  Detailed radiology reports for the last 24 hours:    Imaging Results (Last 24 Hours)     Procedure Component Value Units Date/Time    XR Chest 1 View [[id number]] Collected: 12/31/20 1238     Updated: 12/31/20 1240    Narrative:      XR CHEST 1 VW-     CLINICAL INDICATION: svt        COMPARISON: None available      TECHNIQUE: Single frontal view of the chest.     FINDINGS:     There is no focal alveolar infiltrate or effusion.  The cardiac silhouette is normal. The pulmonary vasculature is  unremarkable.  There is no evidence of an acute  osseous abnormality.   There are no suspicious-appearing parenchymal soft tissue nodules.          Impression:      No evidence of active or acute cardiopulmonary disease on today's chest  radiograph.     This report was finalized on 12/31/2020 12:38 PM by Dr. Albaro Barlow MD.           Final impressions for the last 30 days of radiology reports:    Xr Chest 1 View    Result Date: 12/31/2020  No evidence of active or acute cardiopulmonary disease on today's chest radiograph.  This report was finalized on 12/31/2020 12:38 PM by Dr. Albaro Barlow MD.      I have personally looked at the radiology images and read the final radiology report.    Assessment & Plan       Supraventricular tachycardia  Elevated troponins   -Patient with first-time episode with no previous episodes or similar symptoms reported per the patient.   -Patient converted to sinus rhythm after one-time administration of IV adenosine with no recurrence of SVT since.  Case was discussed with cardiology who recommended admission for observation.   -Cardiology consulted and planning for stress test in the morning as well as recommendation of echocardiogram.   -Transthoracic echocardiogram ordered and pending at this time   -Holding off on rate controlling medications per cardiology recommendations.   -Continue cardiac monitoring.   -Elevated troponins likely secondary to patient's SVT rather than underlying ischemia, however important to rule out with stress testing as above.   -Atorvastatin 20 mg.    Anxiety/depression  Hyperlipidemia   -Patient reports being on Ativan 0.5 mg twice daily and Effexor once daily, currently awaiting medication reconciliation by pharmacy.   -Patient on simvastatin but changed to atorvastatin as above.    VTE Prophylaxis:   Mechanical Order History:     None      Pharmalogical Order History:      Ordered     Dose Route Frequency Stop    12/31/20 1632  enoxaparin (LOVENOX) syringe 40 mg      40 mg SC Every 24 Hours --     12/31/20 1613  Pharmacy to Dose enoxaparin (LOVENOX)     Question:  Indication of use  Answer:  Prophylaxis    -- XX Continuous PRN --                Figueroa Barr DO  [facility]  12/31/20  21:04 EST

## 2021-01-04 ENCOUNTER — TRANSITIONAL CARE MANAGEMENT TELEPHONE ENCOUNTER (OUTPATIENT)
Dept: CALL CENTER | Facility: HOSPITAL | Age: 56
End: 2021-01-04

## 2021-01-04 NOTE — OUTREACH NOTE
Call Center TCM Note      Responses   [facility] patient discharged from?  [facility]   Does the patient have one of the following disease processes/diagnoses(primary or secondary)?  Other   TCM attempt successful?  Yes   Call start time  1327   Call end time  1332   Discharge diagnosis  PSVT- now converted to normal sinus rhythm/sinus tachycardia   Is patient permission given to speak with other caregiver?  No   Meds reviewed with patient/caregiver?  Yes   Is the patient having any side effects they believe may be caused by any medication additions or changes?  No   Does the patient have all medications ordered at discharge?  Yes   Is the patient taking all medications as directed (includes completed medication regime)?  Yes   Does the patient have a primary care provider?   Yes   Does the patient have an appointment with their PCP within 7 days of discharge?  Yes   Comments regarding PCP  PCP Dr Lira. Hospital follow up scheduled for 1/8  115pm.    Has the patient kept scheduled appointments due by today?  N/A   Comments  Cardiology appt 2/2   Has home health visited the patient within 72 hours of discharge?  N/A   Psychosocial issues?  No   Did the patient receive a copy of their discharge instructions?  Yes   Nursing interventions  Reviewed instructions with patient   What is the patient's perception of their health status since discharge?  Improving   Is the patient/caregiver able to teach back signs and symptoms related to disease process for when to call PCP?  Yes   Is the patient/caregiver able to teach back signs and symptoms related to disease process for when to call 911?  Yes   Is the patient/caregiver able to teach back the hierarchy of who to call/visit for symptoms/problems? PCP, Specialist, Home health nurse, Urgent Care, ED, 911  Yes   If the patient is a current smoker, are they able to teach back resources for cessation?  Not a smoker   TCM call completed?  Yes          Perri Collado,  RN    1/4/2021, 13:32 EST

## 2021-01-12 ENCOUNTER — OFFICE VISIT (OUTPATIENT)
Dept: FAMILY MEDICINE CLINIC | Facility: CLINIC | Age: 56
End: 2021-01-12

## 2021-01-12 VITALS
RESPIRATION RATE: 18 BRPM | SYSTOLIC BLOOD PRESSURE: 130 MMHG | WEIGHT: 152 LBS | BODY MASS INDEX: 27.97 KG/M2 | DIASTOLIC BLOOD PRESSURE: 80 MMHG | OXYGEN SATURATION: 94 % | HEIGHT: 62 IN | HEART RATE: 75 BPM | TEMPERATURE: 97.5 F

## 2021-01-12 DIAGNOSIS — M19.90 ARTHRITIS: ICD-10-CM

## 2021-01-12 DIAGNOSIS — E78.2 MIXED HYPERLIPIDEMIA: ICD-10-CM

## 2021-01-12 DIAGNOSIS — I47.1 SVT (SUPRAVENTRICULAR TACHYCARDIA) (HCC): Primary | ICD-10-CM

## 2021-01-12 DIAGNOSIS — F41.1 GENERALIZED ANXIETY DISORDER: ICD-10-CM

## 2021-01-12 PROCEDURE — 99214 OFFICE O/P EST MOD 30 MIN: CPT | Performed by: FAMILY MEDICINE

## 2021-01-12 RX ORDER — ATORVASTATIN CALCIUM 20 MG/1
20 TABLET, FILM COATED ORAL NIGHTLY
Qty: 30 TABLET | Refills: 6 | Status: SHIPPED | OUTPATIENT
Start: 2021-01-12 | End: 2021-03-09 | Stop reason: SINTOL

## 2021-01-12 RX ORDER — METOPROLOL SUCCINATE 50 MG/1
50 TABLET, EXTENDED RELEASE ORAL
Qty: 30 TABLET | Refills: 6 | Status: SHIPPED | OUTPATIENT
Start: 2021-01-12 | End: 2021-07-12 | Stop reason: SDUPTHER

## 2021-01-12 NOTE — PROGRESS NOTES
Subjective   Nicole Cannon is a 55 y.o. female.     History of Present Illness follow-up from recent brief acute hospitalization secondary to abrupt onset of SVT.  Chemically converted in the emergency room.  Negative noninvasive cardiac evaluation while hospitalized.  Now on metoprolol.  Zocor was changed to Lipitor.  No indication of cardiac damage.  Echo normal stress test normal.  Asked follow-up with cardiology in a couple weeks.  At present a little fatigue but denies respiratory CDV GI  skin orthopedic concerns besides the chronic hand arthralgias.  Does remind me that did visit with rheumatology a few years ago was briefly on Plaquenil but other medical issues intervened and never had a follow-up.  Hand symptoms are essentially no worse but persist.  No other joint problems.    The following portions of the patient's history were reviewed and updated as appropriate: allergies, current medications, past medical history, past social history, past surgical history and problem list.    Review of Systems  See history of Present Illness     Objective     Physical Exam  Vitals signs reviewed.   Constitutional:       Appearance: Normal appearance.   HENT:      Head: Normocephalic.   Eyes:      Conjunctiva/sclera: Conjunctivae normal.   Neck:      Musculoskeletal: Normal range of motion and neck supple.   Cardiovascular:      Rate and Rhythm: Normal rate and regular rhythm.      Heart sounds: Normal heart sounds.   Pulmonary:      Effort: Pulmonary effort is normal.      Breath sounds: Normal breath sounds.   Skin:     General: Skin is warm and dry.   Neurological:      Mental Status: She is alert and oriented to person, place, and time.   Psychiatric:         Mood and Affect: Mood normal.         PHQ-9 Total Score: 0    Patient's Body mass index is 27.79 kg/m². BMI is above normal parameters. Recommendations include: exercise counseling and nutrition counseling.   (Normal BMI:  18.5-24.9, OW 25-29.9, Obesity 30 or  greater)      Assessment/Plan     Diagnoses and all orders for this visit:    1. SVT (supraventricular tachycardia) (CMS/HCC) (Primary)  -     metoprolol succinate XL (TOPROL-XL) 50 MG 24 hr tablet; Take 1 tablet by mouth Daily.  Dispense: 30 tablet; Refill: 6    2. Generalized anxiety disorder    3. Mixed hyperlipidemia  -     atorvastatin (LIPITOR) 20 MG tablet; Take 1 tablet by mouth Every Night.  Dispense: 30 tablet; Refill: 6    4. Arthritis    Reviewed hospital records.  Continue medications same.  Recheck in about 2 months.  Will probably monitor metabolically at that time.  Stay safely active.  Consider taking the metoprolol in the evening to see if it helps with the daytime fatigue.  Keep follow-up as scheduled with cardiology.  If you decide you want to revisit rheumatology will be happy to refer.  I have no records from there prior recommendation of a DMARD.                     This document has been electronically signed by Erickson Lira MD   January 12, 2021 16:46 EST    Part of this note may be an electronic transcription/translation of spoken language to printed text using the Dragon Dictation System.

## 2021-03-08 ENCOUNTER — OFFICE VISIT (OUTPATIENT)
Dept: CARDIOLOGY | Facility: CLINIC | Age: 56
End: 2021-03-08

## 2021-03-08 VITALS
HEIGHT: 62 IN | DIASTOLIC BLOOD PRESSURE: 91 MMHG | TEMPERATURE: 96 F | BODY MASS INDEX: 27.23 KG/M2 | RESPIRATION RATE: 16 BRPM | OXYGEN SATURATION: 98 % | WEIGHT: 148 LBS | HEART RATE: 69 BPM | SYSTOLIC BLOOD PRESSURE: 139 MMHG

## 2021-03-08 DIAGNOSIS — E78.2 MIXED HYPERLIPIDEMIA: ICD-10-CM

## 2021-03-08 DIAGNOSIS — I10 ESSENTIAL HYPERTENSION: ICD-10-CM

## 2021-03-08 DIAGNOSIS — I47.1 PAROXYSMAL SVT (SUPRAVENTRICULAR TACHYCARDIA) (HCC): Primary | ICD-10-CM

## 2021-03-08 PROBLEM — I47.10 PAROXYSMAL SVT (SUPRAVENTRICULAR TACHYCARDIA): Status: ACTIVE | Noted: 2021-03-08

## 2021-03-08 PROCEDURE — 93000 ELECTROCARDIOGRAM COMPLETE: CPT | Performed by: SPECIALIST

## 2021-03-08 PROCEDURE — 99213 OFFICE O/P EST LOW 20 MIN: CPT | Performed by: SPECIALIST

## 2021-03-08 RX ORDER — CETIRIZINE HYDROCHLORIDE 10 MG/1
10 TABLET ORAL DAILY
COMMUNITY

## 2021-03-08 RX ORDER — LANSOPRAZOLE 15 MG/1
15 CAPSULE, DELAYED RELEASE ORAL DAILY
COMMUNITY

## 2021-03-08 NOTE — PROGRESS NOTES
Subjective   Follow up,  SVT  Nicole Cannon is a 55 y.o. female who presents to day for Rapid Heart Rate ([id number]- 12/31/2020).    CHIEF COMPLIANT  Chief Complaint   Patient presents with   • Rapid Heart Rate     [facility]- 12/31/2020       Active Problems:  Problem List Items Addressed This Visit        Cardiac and Vasculature    Paroxysmal SVT (supraventricular tachycardia) (CMS/HCC) - Primary    Essential hypertension    Mixed hyperlipidemia          HPI  HPI  Was diagnosed with PSVT back on December 31 at the time she also has some chest pain and she had a stress test which was negative for ischemia and also an echocardiogram was essentially normal with normal left ventricular systolic function since then she had no further palpitations no chest pain no shortness of breath she has no edema she has a history of vertigo and occasionally she gets but not very often  PRIOR MEDS  Current Outpatient Medications on File Prior to Visit   Medication Sig Dispense Refill   • atorvastatin (LIPITOR) 20 MG tablet Take 1 tablet by mouth Every Night. 30 tablet 6   • cetirizine (zyrTEC) 10 MG tablet Take 10 mg by mouth Daily.     • lansoprazole (PREVACID) 15 MG capsule Take 15 mg by mouth Daily.     • LORazepam (ATIVAN) 0.5 MG tablet Take 1 tablet by mouth 2 (Two) Times a Day As Needed for Anxiety. 60 tablet 3   • metoprolol succinate XL (TOPROL-XL) 50 MG 24 hr tablet Take 1 tablet by mouth Daily. 30 tablet 6   • venlafaxine XR (EFFEXOR-XR) 75 MG 24 hr capsule Take 1 capsule by mouth Daily. 30 capsule 6     No current facility-administered medications on file prior to visit.       ALLERGIES  Patient has no known allergies.    HISTORY  Past Medical History:   Diagnosis Date   • Arrhythmia    • CRPS (complex regional pain syndrome) type I of lower limb    • Dysfunctional uterine bleeding    • Dysmenorrhea    • Foot fracture    • Hand pain    • Hyperlipidemia    • Itching    • Low back pain    • Lump of skin   "  • Seborrheic keratoses        Social History     Socioeconomic History   • Marital status:      Spouse name: Not on file   • Number of children: Not on file   • Years of education: Not on file   • Highest education level: Not on file   Tobacco Use   • Smoking status: Never Smoker   • Smokeless tobacco: Never Used   Substance and Sexual Activity   • Alcohol use: No   • Drug use: No       Family History   Problem Relation Age of Onset   • Diabetes Mother    • Hyperlipidemia Mother    • Hypertension Mother    • Thyroid disease Mother    • Hyperlipidemia Father    • Cancer Father         PROSTATE       Review of Systems   Constitutional: Negative for activity change, appetite change and fatigue.   HENT: Negative for facial swelling and tinnitus.    Eyes: Positive for visual disturbance (EYE GLASSES). Negative for pain.   Respiratory: Negative for apnea, cough, choking, chest tightness, shortness of breath, wheezing and stridor.    Cardiovascular: Negative for chest pain, palpitations and leg swelling.   Gastrointestinal: Negative for abdominal distention, abdominal pain, nausea and vomiting.   Endocrine: Negative for cold intolerance and heat intolerance.   Genitourinary: Negative.  Negative for flank pain.   Musculoskeletal: Positive for joint swelling and myalgias.   Skin: Negative for color change and rash.   Allergic/Immunologic: Negative for environmental allergies.   Neurological: Positive for light-headedness. Negative for dizziness, syncope, weakness and numbness.   Hematological: Does not bruise/bleed easily.   Psychiatric/Behavioral: Negative for agitation, sleep disturbance and suicidal ideas. The patient is nervous/anxious.        Objective     VITALS: /91 (BP Location: Left arm, Patient Position: Sitting)   Pulse 69   Temp 96 °F (35.6 °C) (Temporal)   Resp 16   Ht 157.5 cm (62\")   Wt 67.1 kg (148 lb)   SpO2 98%   BMI 27.07 kg/m²     LABS:   Lab Results (most recent)     None    "       IMAGING:   XR Chest 1 View    Result Date: 12/31/2020  No evidence of active or acute cardiopulmonary disease on today's chest radiograph.  This report was finalized on 12/31/2020 12:38 PM by Dr. Albaro Barlow MD.        EXAM:  Physical Exam  Vitals reviewed.   Constitutional:       Appearance: She is well-developed.   HENT:      Head: Normocephalic and atraumatic.   Eyes:      Pupils: Pupils are equal, round, and reactive to light.   Neck:      Thyroid: No thyromegaly.      Vascular: No JVD.   Cardiovascular:      Rate and Rhythm: Normal rate and regular rhythm.      Heart sounds: Normal heart sounds. No murmur. No friction rub. No gallop.    Pulmonary:      Effort: Pulmonary effort is normal. No respiratory distress.      Breath sounds: Normal breath sounds. No stridor. No wheezing or rales.   Chest:      Chest wall: No tenderness.   Musculoskeletal:         General: No tenderness or deformity.      Cervical back: Neck supple.   Skin:     General: Skin is warm and dry.   Neurological:      Mental Status: She is alert and oriented to person, place, and time.      Cranial Nerves: No cranial nerve deficit.      Coordination: Coordination normal.         Procedure     ECG 12 Lead    Date/Time: 3/8/2021 12:26 PM  Performed by: Rhonda Banks MD  Authorized by: Rhonda Banks MD   Comparison: compared with previous ECG from 1/1/2021          EKG/ NSR. Otherwise unremarkable EKG, compare with EKG on 12/31/2020 sinus tachycardia has resolved       Assessment/Plan    Diagnosis Plan   1. Paroxysmal SVT (supraventricular tachycardia) (CMS/HCC)     2. Essential hypertension     3. Mixed hyperlipidemia     1.  Her arrhythmia which I reviewed including the monitor looks like consistent with AV node reentrant tachycardia since she has no further episodes we will continue with the metoprolol  2.  Her blood pressure is mildly elevated today but she said that her blood pressure mostly is controlled at home so we will monitor  for now  3.  I have reviewed her labs and the last lipids I have available to me is back a year ago at the time she has significantly elevated LDL and triglycerides we discussed that and she is going to have her primary care physician check her lipids tomorrow and she is on atorvastatin we will continue the current management  4.  We have reviewed together with the patient the results of the echocardiogram and the stress test which are essentially unremarkable      No follow-ups on file.    Diagnoses and all orders for this visit:    1. Paroxysmal SVT (supraventricular tachycardia) (CMS/[facility]) (Primary)    2. Essential hypertension    3. Mixed hyperlipidemia    Other orders  -     ECG 12 Lead        Nicole Cannon  reports that she has never smoked. She has never used smokeless tobacco.      Patient's Body mass index is 27.07 kg/m². BMI is within normal parameters. No follow-up required..           MEDS ORDERED DURING VISIT:  No orders of the defined types were placed in this encounter.        This document has been electronically signed by Rhonda Banks MD  March 8, 2021 13:08 EST

## 2021-03-09 ENCOUNTER — OFFICE VISIT (OUTPATIENT)
Dept: FAMILY MEDICINE CLINIC | Facility: CLINIC | Age: 56
End: 2021-03-09

## 2021-03-09 VITALS
WEIGHT: 151 LBS | BODY MASS INDEX: 27.79 KG/M2 | HEART RATE: 77 BPM | HEIGHT: 62 IN | OXYGEN SATURATION: 99 % | SYSTOLIC BLOOD PRESSURE: 129 MMHG | TEMPERATURE: 98.6 F | DIASTOLIC BLOOD PRESSURE: 80 MMHG

## 2021-03-09 DIAGNOSIS — E78.2 MIXED HYPERLIPIDEMIA: Primary | Chronic | ICD-10-CM

## 2021-03-09 DIAGNOSIS — F41.1 GENERALIZED ANXIETY DISORDER: ICD-10-CM

## 2021-03-09 PROBLEM — I47.1 PAROXYSMAL SVT (SUPRAVENTRICULAR TACHYCARDIA): Chronic | Status: ACTIVE | Noted: 2021-03-08

## 2021-03-09 PROBLEM — I47.10 PAROXYSMAL SVT (SUPRAVENTRICULAR TACHYCARDIA): Chronic | Status: ACTIVE | Noted: 2021-03-08

## 2021-03-09 PROCEDURE — 80053 COMPREHEN METABOLIC PANEL: CPT | Performed by: FAMILY MEDICINE

## 2021-03-09 PROCEDURE — 80061 LIPID PANEL: CPT | Performed by: FAMILY MEDICINE

## 2021-03-09 PROCEDURE — 99214 OFFICE O/P EST MOD 30 MIN: CPT | Performed by: FAMILY MEDICINE

## 2021-03-09 RX ORDER — LORAZEPAM 0.5 MG/1
0.5 TABLET ORAL 2 TIMES DAILY PRN
Qty: 60 TABLET | Refills: 3 | Status: SHIPPED | OUTPATIENT
Start: 2021-03-09 | End: 2021-07-12 | Stop reason: SDUPTHER

## 2021-03-09 RX ORDER — VENLAFAXINE HYDROCHLORIDE 37.5 MG/1
37.5 CAPSULE, EXTENDED RELEASE ORAL DAILY
Qty: 30 CAPSULE | Refills: 6 | Status: SHIPPED | OUTPATIENT
Start: 2021-03-09 | End: 2021-10-11 | Stop reason: SDUPTHER

## 2021-03-09 RX ORDER — ROSUVASTATIN CALCIUM 10 MG/1
10 TABLET, COATED ORAL DAILY
Qty: 30 TABLET | Refills: 6 | Status: SHIPPED | OUTPATIENT
Start: 2021-03-09 | End: 2021-10-11 | Stop reason: SDUPTHER

## 2021-03-09 RX ORDER — VENLAFAXINE HYDROCHLORIDE 75 MG/1
75 CAPSULE, EXTENDED RELEASE ORAL DAILY
Qty: 30 CAPSULE | Refills: 6 | Status: SHIPPED | OUTPATIENT
Start: 2021-03-09 | End: 2021-10-11 | Stop reason: SDUPTHER

## 2021-03-09 NOTE — PROGRESS NOTES
Subjective   Nicole Cannon is a 55 y.o. female.     History of Present Illness follow-up anxiety dyslipidemia PSVT.  Had a brief hospital encounter as noted first of year.  Had cardiology follow-up yesterday.  Utilizing medications as reconciled.  Feels like could benefit from increased dosing on the venlafaxine.  Utilizing Ativan routinely also.  Desires to change to a different statin because of some myalgia side effects.  Denies respiratory GI .  Trying to stay active.  Maintaining pandemic response.  No acute illness or known exposures.    The following portions of the patient's history were reviewed and updated as appropriate: allergies, current medications, past medical history, past social history, past surgical history and problem list.    Review of Systems  See history of Present Illness     Objective     Physical Exam  Vitals reviewed.   Constitutional:       Appearance: Normal appearance.   HENT:      Head: Normocephalic.   Eyes:      Conjunctiva/sclera: Conjunctivae normal.   Cardiovascular:      Rate and Rhythm: Normal rate and regular rhythm.      Heart sounds: Normal heart sounds.   Pulmonary:      Effort: Pulmonary effort is normal.      Breath sounds: Normal breath sounds.   Musculoskeletal:      Cervical back: Normal range of motion and neck supple.   Skin:     General: Skin is warm and dry.   Neurological:      Mental Status: She is alert and oriented to person, place, and time.   Psychiatric:         Mood and Affect: Mood normal.         PHQ-9 Total Score:      Patient's Body mass index is 27.61 kg/m². BMI is above normal parameters. Recommendations include: exercise counseling and nutrition counseling.   (Normal BMI:  18.5-24.9, OW 25-29.9, Obesity 30 or greater)      Assessment/Plan     Diagnoses and all orders for this visit:    1. Mixed hyperlipidemia (Primary)  -     Comprehensive Metabolic Panel  -     Lipid Panel  -     rosuvastatin (CRESTOR) 10 MG tablet; Take 1 tablet by mouth Daily.   Dispense: 30 tablet; Refill: 6    2. Generalized anxiety disorder  -     venlafaxine XR (EFFEXOR-XR) 75 MG 24 hr capsule; Take 1 capsule by mouth Daily.  Dispense: 30 capsule; Refill: 6  -     LORazepam (ATIVAN) 0.5 MG tablet; Take 1 tablet by mouth 2 (Two) Times a Day As Needed for Anxiety.  Dispense: 60 tablet; Refill: 3  -     venlafaxine XR (Effexor XR) 37.5 MG 24 hr capsule; Take 1 capsule by mouth Daily.  Dispense: 30 capsule; Refill: 6    Will renew meds as directed.  Increase the venlafaxine by adding 37.5 daily.  Check labs.  Change to Crestor daily.  Stay safely active maintain heart healthy diet.  Maintain pandemic response.  Will notify of results.  Keep follow-up with cardiology of course.  Recheck here about 4 months.    As part of this patient's treatment plan I am prescribing controlled substances. The patient has been made aware of appropriate use of such medications, including potential risk of somnolence, limited ability to drive and/or work safely, and potential for dependence or overdose. It has also been made clear that these medications are for use by this patient only, without concomitant use of alcohol or other substances unless prescribed.  Patient has completed prescribing agreement detailing terms of continued prescribing of controlled substances, including monitoring JINNY reports, urine drug screening, and pill counts if necessary. The patient is aware that inappropriate use will results in cessation of prescribing such medications.  JINNY report has been reviewed.  JINNY is updated every 3 months.  History and physical exam exhibit continued safe and appropriate use of controlled substances.                        This document has been electronically signed by Erickson Lira MD   March 10, 2021 16:13 EST    Part of this note may be an electronic transcription/translation of spoken language to printed text using the Dragon Dictation System.

## 2021-03-10 LAB
ALBUMIN SERPL-MCNC: 4.9 G/DL (ref 3.5–5.2)
ALBUMIN/GLOB SERPL: 1.9 G/DL
ALP SERPL-CCNC: 95 U/L (ref 39–117)
ALT SERPL W P-5'-P-CCNC: 30 U/L (ref 1–33)
ANION GAP SERPL CALCULATED.3IONS-SCNC: 14.1 MMOL/L (ref 5–15)
AST SERPL-CCNC: 27 U/L (ref 1–32)
BILIRUB SERPL-MCNC: 0.3 MG/DL (ref 0–1.2)
BUN SERPL-MCNC: 10 MG/DL (ref 6–20)
BUN/CREAT SERPL: 13.9 (ref 7–25)
CALCIUM SPEC-SCNC: 9.8 MG/DL (ref 8.6–10.5)
CHLORIDE SERPL-SCNC: 101 MMOL/L (ref 98–107)
CHOLEST SERPL-MCNC: 217 MG/DL (ref 0–200)
CO2 SERPL-SCNC: 25.9 MMOL/L (ref 22–29)
CREAT SERPL-MCNC: 0.72 MG/DL (ref 0.57–1)
GFR SERPL CREATININE-BSD FRML MDRD: 84 ML/MIN/1.73
GLOBULIN UR ELPH-MCNC: 2.6 GM/DL
GLUCOSE SERPL-MCNC: 89 MG/DL (ref 65–99)
HDLC SERPL-MCNC: 43 MG/DL (ref 40–60)
LDLC SERPL CALC-MCNC: 122 MG/DL (ref 0–100)
LDLC/HDLC SERPL: 2.67 {RATIO}
POTASSIUM SERPL-SCNC: 4.2 MMOL/L (ref 3.5–5.2)
PROT SERPL-MCNC: 7.5 G/DL (ref 6–8.5)
SODIUM SERPL-SCNC: 141 MMOL/L (ref 136–145)
TRIGL SERPL-MCNC: 296 MG/DL (ref 0–150)
VLDLC SERPL-MCNC: 52 MG/DL (ref 5–40)

## 2021-03-10 NOTE — PROGRESS NOTES
Lab testing shows blood chemistries to be good.  Lipid profile actually better than last time and should improve with the more potent statin.  Continue all same.  Let us know if problems.  Please notify her cardiologist that this is available.

## 2021-03-18 ENCOUNTER — IMMUNIZATION (OUTPATIENT)
Dept: VACCINE CLINIC | Facility: HOSPITAL | Age: 56
End: 2021-03-18

## 2021-03-18 PROCEDURE — 91300 HC SARSCOV02 VAC 30MCG/0.3ML IM: CPT | Performed by: INTERNAL MEDICINE

## 2021-03-18 PROCEDURE — 0001A: CPT | Performed by: INTERNAL MEDICINE

## 2021-04-08 ENCOUNTER — IMMUNIZATION (OUTPATIENT)
Dept: VACCINE CLINIC | Facility: HOSPITAL | Age: 56
End: 2021-04-08

## 2021-04-08 PROCEDURE — 0002A: CPT | Performed by: INTERNAL MEDICINE

## 2021-04-08 PROCEDURE — 91300 HC SARSCOV02 VAC 30MCG/0.3ML IM: CPT | Performed by: INTERNAL MEDICINE

## 2021-07-12 ENCOUNTER — OFFICE VISIT (OUTPATIENT)
Dept: FAMILY MEDICINE CLINIC | Facility: CLINIC | Age: 56
End: 2021-07-12

## 2021-07-12 VITALS
BODY MASS INDEX: 26.87 KG/M2 | HEART RATE: 72 BPM | WEIGHT: 146 LBS | OXYGEN SATURATION: 96 % | DIASTOLIC BLOOD PRESSURE: 80 MMHG | SYSTOLIC BLOOD PRESSURE: 128 MMHG | HEIGHT: 62 IN | RESPIRATION RATE: 18 BRPM | TEMPERATURE: 97.3 F

## 2021-07-12 DIAGNOSIS — I47.1 SVT (SUPRAVENTRICULAR TACHYCARDIA) (HCC): ICD-10-CM

## 2021-07-12 DIAGNOSIS — Z01.419 WOMEN'S ANNUAL ROUTINE GYNECOLOGICAL EXAMINATION: Primary | ICD-10-CM

## 2021-07-12 DIAGNOSIS — F41.1 GENERALIZED ANXIETY DISORDER: ICD-10-CM

## 2021-07-12 DIAGNOSIS — E78.2 MIXED HYPERLIPIDEMIA: Chronic | ICD-10-CM

## 2021-07-12 DIAGNOSIS — Z12.11 COLON CANCER SCREENING: ICD-10-CM

## 2021-07-12 PROCEDURE — 99214 OFFICE O/P EST MOD 30 MIN: CPT | Performed by: FAMILY MEDICINE

## 2021-07-12 RX ORDER — LORAZEPAM 0.5 MG/1
TABLET ORAL
Qty: 60 TABLET | Refills: 2 | OUTPATIENT
Start: 2021-07-12

## 2021-07-12 RX ORDER — METOPROLOL SUCCINATE 50 MG/1
50 TABLET, EXTENDED RELEASE ORAL
Qty: 30 TABLET | Refills: 6 | Status: SHIPPED | OUTPATIENT
Start: 2021-07-12 | End: 2022-02-28 | Stop reason: SDUPTHER

## 2021-07-12 RX ORDER — LORAZEPAM 0.5 MG/1
0.5 TABLET ORAL 2 TIMES DAILY PRN
Qty: 60 TABLET | Refills: 3 | Status: SHIPPED | OUTPATIENT
Start: 2021-07-12 | End: 2021-10-19 | Stop reason: SDUPTHER

## 2021-07-12 NOTE — PROGRESS NOTES
Subjective   Nicole Cannon is a 55 y.o. female.     History of Present Illness follow-up dyslipidemia anxiety medication management.  Overall doing well.  Enjoying being a grandmother.  Denies chest palpitations GI .  Tolerating medications at current dosing.    The following portions of the patient's history were reviewed and updated as appropriate: allergies, past family history, past medical history, past social history, past surgical history and problem list.    Review of Systems  See history of Present Illness     Objective     Physical Exam  Vitals reviewed.   Constitutional:       Appearance: Normal appearance.   HENT:      Head: Normocephalic.   Cardiovascular:      Rate and Rhythm: Normal rate and regular rhythm.      Heart sounds: Normal heart sounds.   Pulmonary:      Effort: Pulmonary effort is normal.      Breath sounds: Normal breath sounds.   Musculoskeletal:      Cervical back: Normal range of motion and neck supple.   Skin:     General: Skin is warm and dry.   Neurological:      Mental Status: She is alert and oriented to person, place, and time.   Psychiatric:         Mood and Affect: Mood normal.         PHQ-9 Total Score:      Body mass index is 26.7 kg/m².       Assessment/Plan     Diagnoses and all orders for this visit:    1. Women's annual routine gynecological examination (Primary)  -     Ambulatory Referral to Obstetrics / Gynecology    2. Generalized anxiety disorder  -     LORazepam (ATIVAN) 0.5 MG tablet; Take 1 tablet by mouth 2 (Two) Times a Day As Needed for Anxiety.  Dispense: 60 tablet; Refill: 3  -     CBC & Differential; Future    3. SVT (supraventricular tachycardia) (CMS/HCC)  -     metoprolol succinate XL (TOPROL-XL) 50 MG 24 hr tablet; Take 1 tablet by mouth Daily.  Dispense: 30 tablet; Refill: 6    4. Colon cancer screening  -     Ambulatory Referral to Gastroenterology    5. Mixed hyperlipidemia  -     CBC & Differential; Future  -     Comprehensive Metabolic Panel;  Future  -     CK; Future  -     Lipid Panel; Future    Overall stable.  Renewed medications as noted.  Stay safely active.  Maintain reasonable heart healthy diet.  Discussed PME and encouraged colonoscopy and GYN checkup you are agreeable.  Referral was placed.  You will self refer for mammogram.  We will ask for lab prior to your cardiology visit.  Follow-up in about 6 months or as needed.    As part of this patient's treatment plan I am prescribing controlled substances. The patient has been made aware of appropriate use of such medications, including potential risk of somnolence, limited ability to drive and/or work safely, and potential for dependence or overdose. It has also been made clear that these medications are for use by this patient only, without concomitant use of alcohol or other substances unless prescribed.  Patient has completed prescribing agreement detailing terms of continued prescribing of controlled substances, including monitoring JINNY reports, urine drug screening, and pill counts if necessary. The patient is aware that inappropriate use will results in cessation of prescribing such medications.  JINNY report has been reviewed.  JINNY is updated every 3 months.  History and physical exam exhibit continued safe and appropriate use of controlled substances.                        This document has been electronically signed by Erickson Lira MD   July 12, 2021 14:51 EDT    Part of this note may be an electronic transcription/translation of spoken language to printed text using the Dragon Dictation System.

## 2021-08-18 ENCOUNTER — APPOINTMENT (OUTPATIENT)
Dept: CT IMAGING | Facility: HOSPITAL | Age: 56
End: 2021-08-18

## 2021-08-18 ENCOUNTER — HOSPITAL ENCOUNTER (EMERGENCY)
Facility: HOSPITAL | Age: 56
Discharge: HOME | End: 2021-08-18
Attending: EMERGENCY MEDICINE | Admitting: EMERGENCY MEDICINE

## 2021-08-18 ENCOUNTER — APPOINTMENT (OUTPATIENT)
Dept: GENERAL RADIOLOGY | Facility: HOSPITAL | Age: 56
End: 2021-08-18

## 2021-08-18 VITALS
OXYGEN SATURATION: 95 % | DIASTOLIC BLOOD PRESSURE: 67 MMHG | HEART RATE: 58 BPM | HEIGHT: 62 IN | SYSTOLIC BLOOD PRESSURE: 107 MMHG | WEIGHT: 138 LBS | RESPIRATION RATE: 16 BRPM | TEMPERATURE: 97.7 F | BODY MASS INDEX: 25.4 KG/M2

## 2021-08-18 DIAGNOSIS — S82.001A CLOSED DISPLACED FRACTURE OF RIGHT PATELLA, UNSPECIFIED FRACTURE MORPHOLOGY, INITIAL ENCOUNTER: ICD-10-CM

## 2021-08-18 DIAGNOSIS — S42.292A OTHER CLOSED DISPLACED FRACTURE OF PROXIMAL END OF LEFT HUMERUS, INITIAL ENCOUNTER: Primary | ICD-10-CM

## 2021-08-18 PROCEDURE — 96375 TX/PRO/DX INJ NEW DRUG ADDON: CPT

## 2021-08-18 PROCEDURE — 73560 X-RAY EXAM OF KNEE 1 OR 2: CPT

## 2021-08-18 PROCEDURE — 25010000002 ONDANSETRON PER 1 MG: Performed by: EMERGENCY MEDICINE

## 2021-08-18 PROCEDURE — 25010000002 HYDROMORPHONE PER 4 MG: Performed by: EMERGENCY MEDICINE

## 2021-08-18 PROCEDURE — 73030 X-RAY EXAM OF SHOULDER: CPT

## 2021-08-18 PROCEDURE — 73200 CT UPPER EXTREMITY W/O DYE: CPT

## 2021-08-18 PROCEDURE — 96374 THER/PROPH/DIAG INJ IV PUSH: CPT

## 2021-08-18 PROCEDURE — 73700 CT LOWER EXTREMITY W/O DYE: CPT

## 2021-08-18 PROCEDURE — 99283 EMERGENCY DEPT VISIT LOW MDM: CPT

## 2021-08-18 PROCEDURE — 96376 TX/PRO/DX INJ SAME DRUG ADON: CPT

## 2021-08-18 RX ORDER — ONDANSETRON 2 MG/ML
4 INJECTION INTRAMUSCULAR; INTRAVENOUS ONCE
Status: COMPLETED | OUTPATIENT
Start: 2021-08-18 | End: 2021-08-18

## 2021-08-18 RX ORDER — ONDANSETRON HYDROCHLORIDE 8 MG/1
8 TABLET, FILM COATED ORAL EVERY 8 HOURS PRN
Qty: 20 TABLET | Refills: 0 | Status: SHIPPED | OUTPATIENT
Start: 2021-08-18 | End: 2021-08-20

## 2021-08-18 RX ORDER — HYDROCODONE BITARTRATE AND ACETAMINOPHEN 10; 325 MG/1; MG/1
1 TABLET ORAL ONCE
Status: DISCONTINUED | OUTPATIENT
Start: 2021-08-18 | End: 2021-08-18 | Stop reason: HOSPADM

## 2021-08-18 RX ORDER — HYDROMORPHONE HYDROCHLORIDE 1 MG/ML
0.5 INJECTION, SOLUTION INTRAMUSCULAR; INTRAVENOUS; SUBCUTANEOUS ONCE
Status: COMPLETED | OUTPATIENT
Start: 2021-08-18 | End: 2021-08-18

## 2021-08-18 RX ORDER — HYDROCODONE BITARTRATE AND ACETAMINOPHEN 5; 325 MG/1; MG/1
1-2 TABLET ORAL EVERY 6 HOURS PRN
Qty: 20 TABLET | Refills: 0 | Status: SHIPPED | OUTPATIENT
Start: 2021-08-18 | End: 2021-08-20

## 2021-08-18 RX ORDER — HYDROMORPHONE HYDROCHLORIDE 1 MG/ML
0.25 INJECTION, SOLUTION INTRAMUSCULAR; INTRAVENOUS; SUBCUTANEOUS ONCE
Status: COMPLETED | OUTPATIENT
Start: 2021-08-18 | End: 2021-08-18

## 2021-08-18 RX ORDER — SODIUM CHLORIDE 0.9 % (FLUSH) 0.9 %
10 SYRINGE (ML) INJECTION AS NEEDED
Status: DISCONTINUED | OUTPATIENT
Start: 2021-08-18 | End: 2021-08-18 | Stop reason: HOSPADM

## 2021-08-18 RX ADMIN — ONDANSETRON 4 MG: 2 INJECTION INTRAMUSCULAR; INTRAVENOUS at 11:31

## 2021-08-18 RX ADMIN — HYDROMORPHONE HYDROCHLORIDE 0.5 MG: 1 INJECTION, SOLUTION INTRAMUSCULAR; INTRAVENOUS; SUBCUTANEOUS at 11:30

## 2021-08-18 RX ADMIN — SODIUM CHLORIDE 500 ML: 9 INJECTION, SOLUTION INTRAVENOUS at 12:16

## 2021-08-18 RX ADMIN — HYDROMORPHONE HYDROCHLORIDE 0.25 MG: 1 INJECTION, SOLUTION INTRAMUSCULAR; INTRAVENOUS; SUBCUTANEOUS at 13:59

## 2021-08-18 NOTE — DISCHARGE INSTRUCTIONS
Rest with no vigorous physical exercise    Use the knee immobilizer at all times or any weightbearing as possible    Leave the left arm in a sling with his little pressure on the arm as possible and limitation of movement is much as possible    Take the Norco as needed.  This is a narcotic.  It may cause nausea dizziness and low blood pressure.  Stay well-hydrated while taking this medication and take only as needed using over-the-counter ibuprofen or Tylenol as your primary medications    Follow-up with Dr. Choi in the office.  I have discussed your injury with him today and he will arrange for prompt appropriate follow-up in the office    Return to the ED at once if you have any acute urgent emergent significant or progressive symptoms    Follow-up with your PCP as needed

## 2021-08-18 NOTE — ED PROVIDER NOTES
Subjective   Patient is a 56 y.o. female presenting to the ED complaining of a fall. The patient states that she was in her kitchen when she tripped over her dog and fell. She does not remember specific details of the fall, but does believe that she landed on her outstretched hands and left shoulder. She denies hitting her head or having a syncopal episode.  She has no headache or neck pain currently.  She denies any numbness or weakness.  She can  her hand without difficulty.  She has pain with movement of the shoulder.  She reports some mild pain in her right knee and has pain with attempting to bear weight on the right knee since the fall.  She also denies chest pain, shortness of breath, cough, and abdominal pain. The patient is not currently on anticoagulation medication. There are no other acute symptoms at this time.      History provided by:  Patient  Fall  Mechanism of injury: fall    Injury location:  Shoulder/arm  Shoulder/arm injury location:  L hand, R hand and L shoulder  Incident location:  Kitchen  Arrived directly from scene: yes    Fall:     Fall occurred:  Standing    Impact surface:  Hard floor    Point of impact: outstretched hands and left shoulder.    Entrapped after fall: no    Prior to arrival data:     Loss of consciousness: no      Amnesic to event: no    Associated symptoms: no abdominal pain, no chest pain, no difficulty breathing, no headaches and no loss of consciousness    Risk factors: no anticoagulation therapy, no CABG, no CAD, no CHF, no COPD, no diabetes, no kidney disease and not pregnant        Review of Systems   Constitutional: Negative for chills and fever.   HENT: Negative for congestion.    Respiratory: Negative for cough and shortness of breath.    Cardiovascular: Negative for chest pain.   Gastrointestinal: Negative for abdominal pain and blood in stool.   Genitourinary: Negative for dysuria and hematuria.   Musculoskeletal:        Pain in left shoulder and hands  bilaterally.   Neurological: Negative for loss of consciousness, syncope, weakness, numbness and headaches.   All other systems reviewed and are negative.      Past Medical History:   Diagnosis Date   • Arrhythmia    • CRPS (complex regional pain syndrome) type I of lower limb    • Dysfunctional uterine bleeding    • Dysmenorrhea    • Foot fracture    • Hand pain    • Hyperlipidemia    • Itching    • Low back pain    • Lump of skin    • Seborrheic keratoses        No Known Allergies    History reviewed. No pertinent surgical history.    Family History   Problem Relation Age of Onset   • Diabetes Mother    • Hyperlipidemia Mother    • Hypertension Mother    • Thyroid disease Mother    • Hyperlipidemia Father    • Cancer Father         PROSTATE       Social History     Socioeconomic History   • Marital status:      Spouse name: Not on file   • Number of children: Not on file   • Years of education: Not on file   • Highest education level: Not on file   Tobacco Use   • Smoking status: Never Smoker   • Smokeless tobacco: Never Used   Vaping Use   • Vaping Use: Never used   Substance and Sexual Activity   • Alcohol use: No   • Drug use: No   • Sexual activity: Defer         Objective   Physical Exam  Vitals and nursing note reviewed.   Constitutional:       General: She is not in acute distress.     Appearance: Normal appearance.   HENT:      Head: Normocephalic and atraumatic.      Right Ear: External ear normal.      Left Ear: External ear normal.      Nose: Nose normal.   Eyes:      General: No scleral icterus.     Conjunctiva/sclera: Conjunctivae normal.   Cardiovascular:      Rate and Rhythm: Normal rate and regular rhythm.      Heart sounds: Normal heart sounds.   Pulmonary:      Effort: Pulmonary effort is normal.      Breath sounds: Normal breath sounds.   Abdominal:      General: There is no distension.      Palpations: Abdomen is soft.      Tenderness: There is no abdominal tenderness.   Musculoskeletal:          General: Swelling and tenderness present. No deformity or signs of injury. Normal range of motion.      Cervical back: Normal range of motion and neck supple. No tenderness.      Comments: Pain to range of motion of the left shoulder, obvious swelling and deformity.  Tenderness to palpation.  The remainder of the left upper extremity however is nontender.  Patient can flex extend AB and adduction of the digits with normal opposition sensation and perfusion    Minor erythema in the prepatellar area with mild swelling of the anterior knee.  There is no Lockman drawer or stress eversion laxity.   Skin:     General: Skin is warm and dry.      Comments: No crepitance.  No clubbing, cyanosis, or edema.     Neurological:      Mental Status: She is alert and oriented to person, place, and time.      Sensory: No sensory deficit.      Motor: No weakness.      Comments: No dysphasia or ataxia.   Psychiatric:         Mood and Affect: Mood normal.         Behavior: Behavior normal.         Procedures         ED Course  ED Course as of Aug 18 2212   Wed Aug 18, 2021   1118 I have personally reviewed the radiograph of the shoulder with comminuted fracture and the knee with a questionable bipartite versus fractured patella.  We will proceed with CT.  Of discussed these and reviewed them with Dr. Franco, radiology    [HH]   1129 Dr. Choi is reviewed the radiograph.  I discussed case with him.  He is recommended a CT of the shoulder as well and would like this done for follow-up in the office.  Have ordered a CT    [HH]   1131 I discussed the findings with the patient.  She would like to follow-up with Dr. Choi who is her family orthopedist.     [HH]   1318 CTs reveal the comminuted fracture of the humerus as above.  The patella appears to be fractured in 2 places as well.  We will place a knee immobilizer.  A sling is placed on her left arm already.    [HH]   1326 I discussed findings with the patient and her spouse at the  bedside and reviewed the results of the CTs and radiographs to their satisfaction.  I discussed the arrangements with Dr. Choi to have them seen in the office and they will call today to make that appointment.  I discussed analgesics risks and benefits.  The patient does get nauseated with narcotics and we will treat her with Zofran as well.  We discussed indications for immediate return and activity limitationsVery pleasant responsible patient and supportive spouse verbalized understanding agreement plan of care, need for follow-up, and indications for return.    [HH]   1328 JINNY query complete. Treatment plan to include limited course of prescribed  controlled substance. Risks including addiction, benefits, and alternatives presented to patient.       [HH]   1341 I discussed no weightbearing with the patient.  I have given a prescription for a wheelchair as with her fracture and her knee immobilizer she will be able to use crutches.  We discussed follow-up again.  She will be treated with additional analgesics prior to discharge    [HH]      ED Course User Index  [HH] Dejuan Sepulveda MD                                            [facility]    Final diagnoses:   Other closed displaced fracture of proximal end of left humerus, initial encounter   Closed displaced fracture of right patella, unspecified fracture morphology, initial encounter       Documentation assistance provided by ángel Peterson.  Information recorded by the ángel was done at my direction and has been verified and validated by me.     Chris Peterson  08/18/21 0420       Dejuan Sepulveda MD  08/18/21 2242

## 2021-08-19 ENCOUNTER — OFFICE VISIT (OUTPATIENT)
Dept: ORTHOPEDIC SURGERY | Facility: CLINIC | Age: 56
End: 2021-08-19

## 2021-08-19 VITALS
WEIGHT: 138 LBS | HEART RATE: 66 BPM | DIASTOLIC BLOOD PRESSURE: 62 MMHG | SYSTOLIC BLOOD PRESSURE: 115 MMHG | HEIGHT: 62 IN | BODY MASS INDEX: 25.4 KG/M2

## 2021-08-19 DIAGNOSIS — S82.044A CLOSED NONDISPLACED COMMINUTED FRACTURE OF RIGHT PATELLA, INITIAL ENCOUNTER: ICD-10-CM

## 2021-08-19 DIAGNOSIS — M25.512 LEFT SHOULDER PAIN, UNSPECIFIED CHRONICITY: ICD-10-CM

## 2021-08-19 DIAGNOSIS — S42.292A CLOSED 3-PART FRACTURE OF PROXIMAL END OF LEFT HUMERUS, INITIAL ENCOUNTER: Primary | ICD-10-CM

## 2021-08-19 PROCEDURE — 99204 OFFICE O/P NEW MOD 45 MIN: CPT | Performed by: ORTHOPAEDIC SURGERY

## 2021-08-19 NOTE — PROGRESS NOTES
[facility] [place] - Kem Mark MD    Office Visit       Patient Name: Nicole Cannon    Chief Complaint:   Chief Complaint   Patient presents with   • Left Shoulder - Pain   • Right Knee - Pain       Referring Physician: Dr. Heber Choi MD    History of Present Illness:   Nicole Cannon is a 56 y.o. female who presents with left body part: shoulder and right knee Reason: pain.  Onset:Onset: mechanical fall. The issue has been ongoing for 1 day(s). Pain is a 3/10 on the pain scale. Pain is described as Pain Characterization: aching. Associated symptoms include Symptoms: pain, popping, grinding and giving way/buckling. The pain is worse with any movement of the joint; pain medication and/or NSAID and elevating the extremity improve the pain. Previous treatments have included: bracing and NSAIDS. I have reviewed the patient's history of present illness as noted/entered above.    I have reviewed the patient's past medical history, surgical history, social history, family history, medications, and allergies as noted in the electronic medical record and as noted/entered.  I have reviewed the patient's review of systems as noted/enter and updated as noted in the patient's HPI.      Right nondisplaced patellar fracture, comminuted left proximal humerus fracture    One of my best friends, Arias's, mother-in-law --excellent family  Visiting grandchildren in [place], tripped over the dog  DOI: 8/18/2021  MYNOR: tripped over dog, holding grandson Will  Will (2yr) and [facility] ER note reviewed from 8/18/2021    Subjective   Subjective      Review of Systems   Constitutional: Negative.  Negative for chills, fatigue and fever.   HENT: Negative.  Negative for congestion and dental problem.    Eyes: Negative.  Negative for blurred vision.   Respiratory: Negative.  Negative for shortness of breath.    Cardiovascular:  Negative.  Negative for leg swelling.   Gastrointestinal: Negative.  Negative for abdominal pain.   Endocrine: Negative.  Negative for polyuria.   Genitourinary: Negative.  Negative for difficulty urinating.   Musculoskeletal: Positive for arthralgias.   Skin: Negative.    Allergic/Immunologic: Negative.    Neurological: Negative.    Hematological: Negative.  Negative for adenopathy.   Psychiatric/Behavioral: Negative.  Negative for behavioral problems.        Past Medical History:   Past Medical History:   Diagnosis Date   • Arrhythmia    • CRPS (complex regional pain syndrome) type I of lower limb    • Dysfunctional uterine bleeding    • Dysmenorrhea    • Foot fracture    • Hand pain    • Hyperlipidemia    • Itching    • Low back pain    • Lump of skin    • Seborrheic keratoses        Past Surgical History: No past surgical history on file.    Family History:   Family History   Problem Relation Age of Onset   • Diabetes Mother    • Hyperlipidemia Mother    • Hypertension Mother    • Thyroid disease Mother    • Hyperlipidemia Father    • Cancer Father         PROSTATE       Social History:   Social History     Socioeconomic History   • Marital status:      Spouse name: Not on file   • Number of children: Not on file   • Years of education: Not on file   • Highest education level: Not on file   Tobacco Use   • Smoking status: Never Smoker   • Smokeless tobacco: Never Used   Vaping Use   • Vaping Use: Never used   Substance and Sexual Activity   • Alcohol use: No   • Drug use: No   • Sexual activity: Defer       Medications:   Current Outpatient Medications:   •  cetirizine (zyrTEC) 10 MG tablet, Take 10 mg by mouth Daily., Disp: , Rfl:   •  HYDROcodone-acetaminophen (NORCO) 5-325 MG per tablet, Take 1-2 tablets by mouth Every 6 (Six) Hours As Needed for Moderate Pain ., Disp: 20 tablet, Rfl: 0  •  lansoprazole (PREVACID) 15 MG capsule, Take 15 mg by mouth Daily., Disp: , Rfl:   •  LORazepam (ATIVAN) 0.5 MG  "tablet, Take 1 tablet by mouth 2 (Two) Times a Day As Needed for Anxiety., Disp: 60 tablet, Rfl: 3  •  metoprolol succinate XL (TOPROL-XL) 50 MG 24 hr tablet, Take 1 tablet by mouth Daily., Disp: 30 tablet, Rfl: 6  •  ondansetron (ZOFRAN) 8 MG tablet, Take 1 tablet by mouth Every 8 (Eight) Hours As Needed for Nausea., Disp: 20 tablet, Rfl: 0  •  rosuvastatin (CRESTOR) 10 MG tablet, Take 1 tablet by mouth Daily., Disp: 30 tablet, Rfl: 6  •  venlafaxine XR (Effexor XR) 37.5 MG 24 hr capsule, Take 1 capsule by mouth Daily., Disp: 30 capsule, Rfl: 6  •  venlafaxine XR (EFFEXOR-XR) 75 MG 24 hr capsule, Take 1 capsule by mouth Daily., Disp: 30 capsule, Rfl: 6    Allergies: No Known Allergies    The following portions of the patient's history were reviewed and updated as appropriate: allergies, current medications, past family history, past medical history, past social history, past surgical history and problem list.        Objective    Objective      Vital Signs:   Vitals:    08/19/21 1412   BP: 115/62   Pulse: 66   Weight: 62.6 kg (138 lb)   Height: 157.5 cm (62.01\")       Ortho Exam:  Left shoulder proximal bruising no obvious deformity, tenderness at the fracture site no elbow pain no distal humeral pain    Comfortable out of the sling with good gross alignment    Right knee extensor mechanism is intact no open lesions quite stoic with regards to the knee some effusion but mild pain about the fracture site    Results Review:   Imaging Results (Last 24 Hours)     Procedure Component Value Units Date/Time    XR Shoulder 2+ View Left [[id number]] Resulted: 08/19/21 1506     Updated: 08/19/21 1507    Narrative:      Imaging: shoulder x-rays 2 views - AP and Y x-ray views    Side: LEFT SHOULDER    Indication for shoulder x-ray 2 views: shoulder pain, proximal humeral   fracture    Comparison: ER x-rays and CT scan comparison views available    Findings:   Left comminuted proximal humeral fracture.  2 AP views were completed. "  It   is notable that with straightening of the arm the neck shaft alignment is   excellent and improved.  Counseled the patient and  and reviewed   together.  Overall acceptable alignment maintained on the Y radiographs   and the follow-up AP showed acceptable alignment as well.    I personally reviewed the above x-rays and discussed with the patient.          XR Shoulder 2+ View Left    Result Date: 8/19/2021  Comminuted humeral neck fracture with extension into the greater tuberosity.  D:  08/18/2021 E:  08/18/2021  This report was finalized on 8/19/2021 11:31 AM by Dr. Izabel Franco MD.      XR Knee 1 or 2 View Right    Result Date: 8/19/2021  There is evidence of a joint effusion with findings concerning for possible fracture of the patella on AP imaging.  D:  08/18/2021 E:  08/18/2021  This report was finalized on 8/19/2021 11:31 AM by Dr. Izabel Franco MD.      CT Lower Extremity Right Without Contrast    Result Date: 8/19/2021  2 nondisplaced fractures involving the patella. There is a large joint effusion identified of the suprapatella bursa with no significant degenerative changes present. Only mild lateral tilt of the patella in respect to the trochlear groove.  D:  08/18/2021 E:  08/18/2021  This report was finalized on 8/19/2021 11:31 AM by Dr. Izabel Franco MD.      CT Upper Extremity Left Without Contrast    Result Date: 8/19/2021  Comminuted and mildly impacted humeral neck fracture with extension to the greater tuberosity. The articular surface is intact.  D:  08/18/2021 E:  08/18/2021   This report was finalized on 8/19/2021 11:31 AM by Dr. Izabel Franco MD.        I personally reviewed the CT scans and interpreted the scans x-ray and CT above.  Comminuted patellar fracture maintains excellent alignment.  Comminuted left proximal humeral fracture that has better alignment on clinical radiographs today in the office.  Reasonable chance for great recovery and healing with  alignment as it is currently.  She does have extension the greater tuberosity that maintains excellent alignment the main issue will be making sure the neck shaft junction maintains acceptable alignment appears better and the radiographs today.    Procedures             Assessment / Plan      Assessment/Plan:   Problem List Items Addressed This Visit        Musculoskeletal and Injuries    Closed 3-part fracture of proximal end of left humerus - Primary    Closed nondisplaced comminuted fracture of right patella      Other Visit Diagnoses     Left shoulder pain, unspecified chronicity        Relevant Orders    XR Shoulder 2+ View Left (Completed)        Left shoulder proximal humeral fracture with comminution.  Counseled on operative versus nonoperative measures.  We will continue to follow this closely with weekly follow-ups for the next couple of weeks and then we will transition out of the sling and start therapy as long as maintained alignment.  Counseled on nonunion versus malunion and potential operative intervention may still be in the equation pending results.  Counseled on posttraumatic stiffness    I will see her back in 1 week to reassess the fractures.    Counseled the patient on the risks and benefits of operative versus nonoperative measures and nonoperative measures recommended for this patellar fracture as well.  Nonsurgical measures recommended for the patellar fracture    Hinged knee brace recommended and provided today.        Plan for a right patellar fracture:  Hinged knee brace for up to 12 weeks following the injury  Ambulatory assist devices as needed    Weeks 1 through 4: Hinged knee brace will be locked in place for 4 weeks    Weeks 4+ through 8: Hinged knee brace from 0 to 45 degrees but locked at 0 degrees while ambulating    Weeks 8+ through 12: Hinged knee brace from 0 to 90 degrees but locked at 0 degrees while ambulating    Okay to ambulate with the brace unlocked starting at 12 weeks  post injury    Physical therapy okay to participate in physical therapy along the way but avoiding any resistance exercises and primarily focusing on preserving passive range of motion within the tolerated range.  Okay to take breaks from the hinged knee brace while working with physical therapy and for a home home exercise program to be provided by physical therapy.        Follow Up: 1 week with left shoulder AP and Y view and right knee AP lateral and patellar view        Kem Mark MD, FAAOS  Orthopedic Surgeon  Fellowship Trained Shoulder and Elbow Surgeon  [facility]  Orthopedics and Sports Medicine  [address] [place] [address], [address]  [place] [address]    08/19/21  16:50 EDT    Please note that portions of this note may have been completed with a voice recognition program. Efforts were made to edit the dictations, but occasionally words are mistranscribed.

## 2021-08-20 ENCOUNTER — TELEPHONE (OUTPATIENT)
Dept: ORTHOPEDIC SURGERY | Facility: CLINIC | Age: 56
End: 2021-08-20

## 2021-08-20 RX ORDER — HYDROCODONE BITARTRATE AND ACETAMINOPHEN 10; 325 MG/1; MG/1
1 TABLET ORAL EVERY 6 HOURS PRN
Qty: 28 TABLET | Refills: 0 | Status: SHIPPED | OUTPATIENT
Start: 2021-08-20 | End: 2021-08-26

## 2021-08-20 RX ORDER — ONDANSETRON 4 MG/1
4 TABLET, FILM COATED ORAL EVERY 6 HOURS PRN
Qty: 30 TABLET | Refills: 1 | Status: SHIPPED | OUTPATIENT
Start: 2021-08-20 | End: 2021-08-28

## 2021-08-20 NOTE — TELEPHONE ENCOUNTER
----- Message from Kem Mark MD sent at 8/19/2021  4:51 PM EDT -----  Keli-I forgot to check and see if they have enough pain medication from the ER.  Please check with her tomorrow/8/20/2021 and to see if they do need a refill the pain medication.  I be happy to send that in and allow her to run out over the weekend or in advance of our clinic appointment in a week.  If she is doing okay then Tylenol or ibuprofen icing can help but happy to send the medicine if needed.  Thank you

## 2021-08-20 NOTE — TELEPHONE ENCOUNTER
Dr Mark,     She would like a refill please She asked if you could refill the zofran as well since the norco makes her nauseous. [facility] in [place] is a good pharmacy She is doing well otherwise this morning.       Keli

## 2021-08-26 ENCOUNTER — OFFICE VISIT (OUTPATIENT)
Dept: ORTHOPEDIC SURGERY | Facility: CLINIC | Age: 56
End: 2021-08-26

## 2021-08-26 VITALS
HEIGHT: 62 IN | DIASTOLIC BLOOD PRESSURE: 59 MMHG | HEART RATE: 87 BPM | BODY MASS INDEX: 25.4 KG/M2 | SYSTOLIC BLOOD PRESSURE: 104 MMHG | WEIGHT: 138.01 LBS

## 2021-08-26 DIAGNOSIS — S42.292A CLOSED 3-PART FRACTURE OF PROXIMAL END OF LEFT HUMERUS, INITIAL ENCOUNTER: Primary | ICD-10-CM

## 2021-08-26 DIAGNOSIS — S82.044A CLOSED NONDISPLACED COMMINUTED FRACTURE OF RIGHT PATELLA, INITIAL ENCOUNTER: ICD-10-CM

## 2021-08-26 PROCEDURE — 99213 OFFICE O/P EST LOW 20 MIN: CPT | Performed by: ORTHOPAEDIC SURGERY

## 2021-08-26 RX ORDER — ASPIRIN 325 MG
325 TABLET ORAL DAILY
COMMUNITY
End: 2022-06-13

## 2021-08-26 RX ORDER — HYDROCODONE BITARTRATE AND ACETAMINOPHEN 10; 325 MG/1; MG/1
1 TABLET ORAL EVERY 6 HOURS PRN
Qty: 28 TABLET | Refills: 0 | Status: SHIPPED | OUTPATIENT
Start: 2021-08-26 | End: 2021-08-27 | Stop reason: SDUPTHER

## 2021-08-26 NOTE — PROGRESS NOTES
[facility] Orthopaedic Surgery Office Follow Up       Office Follow Up Visit       Patient Name: Nicole Cannon    Chief Complaint:   Chief Complaint   Patient presents with   • Follow-up     1 weeks follow up Closed 3-part fracture of proximal end of left humerus and Closed nondisplaced comminuted fracture of right patella       Referring Physician: No ref. provider found    History of Present Illness:   It has been 1  week(s) since Nicole Cannon's last visit. Nicole Cannon returns to clinic today for F/U: follow-up of left shoulder and right knee tReason: fracture. The issue has been ongoing for 1 week(s). Nicole Cannon rates HIS/HER: herpain at 5/10 on the pain scale. Previous/current treatments: bracing. Current symptoms:Symptoms: same as prior visit. The pain is worse with rising from seated position and any movement of the joint; resting, pain medication and/or NSAID and elevating the extremity improves the pain. Overall, he/she: sheis doing the same. I have reviewed the patient's history of present illness as noted/entered above.    I have reviewed the patient's past medical history, surgical history, social history, family history, medications, and allergies as noted in the electronic medical record and as noted/entered.  I have reviewed the patient's review of systems as noted/enter and updated as noted in the patient's HPI.      Right nondisplaced patellar fracture, comminuted left proximal humerus fracture     One of my best friends, Hillarys, mother-in-law --excellent family  Visiting grandchildren in [place], tripped over the dog  DOI: 8/18/2021  MYNOR: tripped over dog, holding grandson Will  Will (2yr) and [facility] ER note reviewed from 8/18/2021    COMPLEX TRAUMA    8/26/2021:  Pain rx helping, bracing intact, sling LUE  Counseled on recovery            Subjective   Subjective      Review of Systems   Constitutional: Negative.   Negative for chills, fatigue and fever.   HENT: Negative.  Negative for congestion and dental problem.    Eyes: Negative.  Negative for blurred vision.   Respiratory: Negative.  Negative for shortness of breath.    Cardiovascular: Negative.  Negative for leg swelling.   Gastrointestinal: Negative.  Negative for abdominal pain.   Endocrine: Negative.  Negative for polyuria.   Genitourinary: Negative.  Negative for difficulty urinating.   Musculoskeletal: Positive for arthralgias.   Skin: Negative.    Allergic/Immunologic: Negative.    Neurological: Negative.    Hematological: Negative.  Negative for adenopathy.   Psychiatric/Behavioral: Negative.  Negative for behavioral problems.        Past Medical History:   Past Medical History:   Diagnosis Date   • Arrhythmia    • CRPS (complex regional pain syndrome) type I of lower limb    • Dysfunctional uterine bleeding    • Dysmenorrhea    • Foot fracture    • Hand pain    • Hyperlipidemia    • Itching    • Low back pain    • Lump of skin    • Seborrheic keratoses        Past Surgical History: No past surgical history on file.    Family History:   Family History   Problem Relation Age of Onset   • Diabetes Mother    • Hyperlipidemia Mother    • Hypertension Mother    • Thyroid disease Mother    • Hyperlipidemia Father    • Cancer Father         PROSTATE       Social History:   Social History     Socioeconomic History   • Marital status:      Spouse name: Not on file   • Number of children: Not on file   • Years of education: Not on file   • Highest education level: Not on file   Tobacco Use   • Smoking status: Never Smoker   • Smokeless tobacco: Never Used   Vaping Use   • Vaping Use: Never used   Substance and Sexual Activity   • Alcohol use: No   • Drug use: No   • Sexual activity: Defer       Medications:   Current Outpatient Medications:   •  aspirin 325 MG tablet, Take 325 mg by mouth Daily., Disp: , Rfl:   •  Calcium Carbonate (CALCIUM 500 PO), Take  by mouth.,  "Disp: , Rfl:   •  cetirizine (zyrTEC) 10 MG tablet, Take 10 mg by mouth Daily., Disp: , Rfl:   •  lansoprazole (PREVACID) 15 MG capsule, Take 15 mg by mouth Daily., Disp: , Rfl:   •  LORazepam (ATIVAN) 0.5 MG tablet, Take 1 tablet by mouth 2 (Two) Times a Day As Needed for Anxiety., Disp: 60 tablet, Rfl: 3  •  metoprolol succinate XL (TOPROL-XL) 50 MG 24 hr tablet, Take 1 tablet by mouth Daily., Disp: 30 tablet, Rfl: 6  •  ondansetron (Zofran) 4 MG tablet, Take 1 tablet by mouth Every 6 (Six) Hours As Needed for Nausea or Vomiting for up to 8 days., Disp: 30 tablet, Rfl: 1  •  rosuvastatin (CRESTOR) 10 MG tablet, Take 1 tablet by mouth Daily., Disp: 30 tablet, Rfl: 6  •  venlafaxine XR (Effexor XR) 37.5 MG 24 hr capsule, Take 1 capsule by mouth Daily., Disp: 30 capsule, Rfl: 6  •  venlafaxine XR (EFFEXOR-XR) 75 MG 24 hr capsule, Take 1 capsule by mouth Daily., Disp: 30 capsule, Rfl: 6  •  VITAMIN D PO, Take  by mouth., Disp: , Rfl:   •  HYDROcodone-acetaminophen (Norco)  MG per tablet, Take 1 tablet by mouth Every 6 (Six) Hours As Needed for Severe Pain  for up to 7 days., Disp: 28 tablet, Rfl: 0    Allergies: No Known Allergies    The following portions of the patient's history were reviewed and updated as appropriate: allergies, current medications, past family history, past medical history, past social history, past surgical history and problem list.        Objective    Objective      Vital Signs:   Vitals:    08/26/21 1033   BP: 104/59   Pulse: 87   Weight: 62.6 kg (138 lb 0.1 oz)   Height: 157.5 cm (62.01\")       Ortho Exam:  LEFT shoulder, pain improved, bruising  RIGHT knee extensor mechanism in place  No open skin lesions    Results Review:  Imaging Results (Last 24 Hours)     Procedure Component Value Units Date/Time    XR Shoulder 2+ View Left [[id number]] Resulted: 08/26/21 1100     Updated: 08/26/21 1101    Narrative:      Imaging: shoulder x-rays 2 views - AP and axillary x-ray views    Side: LEFT " SHOULDER    Indication for shoulder x-ray 2 views: shoulder pain    Comparison: Prior clinic comparison views available    Findings:  Left shoulder 2 views show stable alignment compared to prior she has   significant comminution some initial displacement but maintains   satisfactory alignment compared to prior.  Comminution of the greater   tuberosity noted as well.    I personally reviewed the above x-rays and discussed with the patient.      XR Knee 1 or 2 View Right [[id number]] Resulted: 08/26/21 1059     Updated: 08/26/21 1100    Narrative:      Right knee 3 views  Indication right patella fracture, comparison to prior images reviewed  Findings: Right patellar fracture minimally displaced and appears to be   stable compared to prior with satisfactory alignment without disruption of   the extensor mechanism.          XR Shoulder 2+ View Left    Result Date: 8/19/2021  Comminuted humeral neck fracture with extension into the greater tuberosity.  D:  08/18/2021 E:  08/18/2021  This report was finalized on 8/19/2021 11:31 AM by Dr. Izabel Franco MD.      XR Knee 1 or 2 View Right    Result Date: 8/19/2021  There is evidence of a joint effusion with findings concerning for possible fracture of the patella on AP imaging.  D:  08/18/2021 E:  08/18/2021  This report was finalized on 8/19/2021 11:31 AM by Dr. Izabel Franco MD.      CT Lower Extremity Right Without Contrast    Result Date: 8/19/2021  2 nondisplaced fractures involving the patella. There is a large joint effusion identified of the suprapatella bursa with no significant degenerative changes present. Only mild lateral tilt of the patella in respect to the trochlear groove.  D:  08/18/2021 E:  08/18/2021  This report was finalized on 8/19/2021 11:31 AM by Dr. Izabel Franco MD.      CT Upper Extremity Left Without Contrast    Result Date: 8/19/2021  Comminuted and mildly impacted humeral neck fracture with extension to the greater tuberosity.  The articular surface is intact.  D:  08/18/2021 E:  08/18/2021   This report was finalized on 8/19/2021 11:31 AM by Dr. Izabel Franco MD.        Procedures          Assessment / Plan      Assessment/Plan:   Problem List Items Addressed This Visit        Musculoskeletal and Injuries    Closed 3-part fracture of proximal end of left humerus - Primary    Relevant Medications    ondansetron (Zofran) 4 MG tablet    HYDROcodone-acetaminophen (Norco)  MG per tablet    Other Relevant Orders    XR Shoulder 2+ View Left (Completed)    Ambulatory Referral to Physical Therapy Evaluate and treat, Ortho (Completed)    Closed nondisplaced comminuted fracture of right patella    Relevant Medications    ondansetron (Zofran) 4 MG tablet    HYDROcodone-acetaminophen (Norco)  MG per tablet    Other Relevant Orders    XR Knee 1 or 2 View Right (Completed)    Ambulatory Referral to Physical Therapy Evaluate and treat, Ortho (Completed)          Left shoulder counseled on nonoperative treatment course elbow wrist hand exercises encouraged breaks in a sling encourage breaks in the right knee brace encouraged as well.  Provided a therapy protocol for the knee and the shoulder.  Therapy will be critical does appear to have potential for healing malunion or nonunion still possible but I do think continued nonoperative course is warranted given her maintained alignment.    Counseled on potential osteoporotic component and they will discuss with PCP as healing time goes along.  She will also continue vitamin D and calcium.    Plan for a right patellar fracture:  Hinged knee brace for up to 12 weeks following the injury  Ambulatory assist devices as needed     Weeks 1 through 4: Hinged knee brace will be locked in place for 4 weeks     Weeks 4+ through 8: Hinged knee brace from 0 to 45 degrees but locked at 0 degrees while ambulating     Weeks 8+ through 12: Hinged knee brace from 0 to 90 degrees but locked at 0 degrees while  ambulating     Okay to ambulate with the brace unlocked starting at 12 weeks post injury    Follow Up: 2 weeks with right knee 3 views and left shoulder AP and Y      Kem Mark MD, FAAOS  Orthopedic Surgeon  Fellowship Trained Shoulder and Elbow Surgeon  [facility]  Orthopedics and Sports Medicine  [address] [place] [address], [address]  [place] [address]    08/26/21  11:30 EDT    Please note that portions of this note may have been completed with a voice recognition program. Efforts were made to edit the dictations, but occasionally words are mistranscribed.

## 2021-08-27 ENCOUNTER — TELEPHONE (OUTPATIENT)
Dept: ORTHOPEDIC SURGERY | Facility: CLINIC | Age: 56
End: 2021-08-27

## 2021-08-27 DIAGNOSIS — S82.044A CLOSED NONDISPLACED COMMINUTED FRACTURE OF RIGHT PATELLA, INITIAL ENCOUNTER: ICD-10-CM

## 2021-08-27 DIAGNOSIS — S42.292A CLOSED 3-PART FRACTURE OF PROXIMAL END OF LEFT HUMERUS, INITIAL ENCOUNTER: ICD-10-CM

## 2021-08-27 RX ORDER — HYDROCODONE BITARTRATE AND ACETAMINOPHEN 10; 325 MG/1; MG/1
1 TABLET ORAL EVERY 6 HOURS PRN
Qty: 28 TABLET | Refills: 0 | Status: SHIPPED | OUTPATIENT
Start: 2021-08-27 | End: 2021-09-03

## 2021-08-27 NOTE — TELEPHONE ENCOUNTER
Dr. Escobar-would you be able to send in Rx for [place]  q6 hrs #28 for this Dr. Mark pt in his absence?    Dx: Closed 3-part fracture of proximal end of left humerus, Closed nondisplaced comminuted fracture of right patella    [facility] but there is something wrong with their system so pt would like it sent to [facility] in [place] instead (should be corrected in system).    Thanks!  Jesus

## 2021-08-27 NOTE — TELEPHONE ENCOUNTER
Spoke with pt's  to let them know Rx was sent to [facility] pharmacy and looks like receipt was confirmed by pharmacy.    Jesus

## 2021-09-09 ENCOUNTER — OFFICE VISIT (OUTPATIENT)
Dept: ORTHOPEDIC SURGERY | Facility: CLINIC | Age: 56
End: 2021-09-09

## 2021-09-09 VITALS
HEIGHT: 62 IN | DIASTOLIC BLOOD PRESSURE: 82 MMHG | BODY MASS INDEX: 25.4 KG/M2 | SYSTOLIC BLOOD PRESSURE: 145 MMHG | WEIGHT: 138.01 LBS

## 2021-09-09 DIAGNOSIS — S82.044A CLOSED NONDISPLACED COMMINUTED FRACTURE OF RIGHT PATELLA, INITIAL ENCOUNTER: ICD-10-CM

## 2021-09-09 DIAGNOSIS — M25.561 RIGHT KNEE PAIN, UNSPECIFIED CHRONICITY: ICD-10-CM

## 2021-09-09 DIAGNOSIS — S42.292A CLOSED 3-PART FRACTURE OF PROXIMAL END OF LEFT HUMERUS, INITIAL ENCOUNTER: Primary | ICD-10-CM

## 2021-09-09 DIAGNOSIS — M25.512 LEFT SHOULDER PAIN, UNSPECIFIED CHRONICITY: ICD-10-CM

## 2021-09-09 PROCEDURE — 99213 OFFICE O/P EST LOW 20 MIN: CPT | Performed by: ORTHOPAEDIC SURGERY

## 2021-09-09 RX ORDER — HYDROCODONE BITARTRATE AND ACETAMINOPHEN 10; 325 MG/1; MG/1
1 TABLET ORAL EVERY 6 HOURS PRN
Qty: 28 TABLET | Refills: 0 | Status: SHIPPED | OUTPATIENT
Start: 2021-09-09 | End: 2021-09-16

## 2021-09-09 NOTE — PROGRESS NOTES
[facility] [place] Up       Office Follow Up Visit       Patient Name: Nicole Cannon    Chief Complaint:   Chief Complaint   Patient presents with   • Follow-up     2 weeks follow up Closed 3-part fracture of proximal end of left humerus and Closed nondisplaced comminuted fracture of right patella (DOI: 8/18/2021)       Referring Physician: No ref. provider found    History of Present Illness:   It has been 2  week(s) since Nicole Cannon's last visit. Nicole Cannon returns to clinic today for F/U: follow-up of leftBody Part: humerusReason: fracture. The issue has been ongoing for 3 week(s). Nicole Cannon rates HIS/HER: herpain at 5/10 on the pain scale. Previous/current treatments: bracing, NSAIDS. Current symptoms:Symptoms: pain. The pain is worse with sleeping; heat and pain medication and/or NSAID improves the pain. Overall, he/she: sheis doing better. I have reviewed the patient's history of present illness as noted/entered above.    I have reviewed the patient's past medical history, surgical history, social history, family history, medications, and allergies as noted in the electronic medical record and as noted/entered.  I have reviewed the patient's review of systems as noted/enter and updated as noted in the patient's HPI.    Right nondisplaced patellar fracture, comminuted left proximal humerus fracture     One of my best friends, Roger, mother-in-law --excellent family  Visiting grandchildren in [place], tripped over the dog  DOI: 8/18/2021  MYNOR: tripped over dog (Rosaura), holding grandson Will  Will (2yr) and [place]      9/9/2021:  Subjective improvements, self weaned out of the knee brace but has done well with that and is working with physical therapy for the knee and the shoulder    Right nondisplaced patellar fracture, comminuted left proximal humerus fracture     One of my best friends, Hillarys, mother-in-law  --excellent family  Visiting grandchildren in [place], tripped over the dog  DOI: 8/18/2021  MYNOR: tripped over dog, holding grandson Will  Will (2yr) and [facility] ER note reviewed from 8/18/2021     COMPLEX TRAUMA     8/26/2021:  Pain rx helping, bracing intact, sling LUE  Counseled on recovery      Subjective   Subjective      Review of Systems   Constitutional: Negative.    HENT: Negative.    Eyes: Negative.    Respiratory: Negative.    Cardiovascular: Negative.    Gastrointestinal: Negative.    Endocrine: Negative.    Genitourinary: Negative.    Musculoskeletal: Positive for arthralgias.   Allergic/Immunologic: Negative.    Neurological: Negative.    Hematological: Negative.    Psychiatric/Behavioral: Negative.         Past Medical History:   Past Medical History:   Diagnosis Date   • Arrhythmia    • CRPS (complex regional pain syndrome) type I of lower limb    • Dysfunctional uterine bleeding    • Dysmenorrhea    • Foot fracture    • Hand pain    • Hyperlipidemia    • Itching    • Low back pain    • Lump of skin    • Seborrheic keratoses        Past Surgical History: No past surgical history on file.    Family History:   Family History   Problem Relation Age of Onset   • Diabetes Mother    • Hyperlipidemia Mother    • Hypertension Mother    • Thyroid disease Mother    • Hyperlipidemia Father    • Cancer Father         PROSTATE       Social History:   Social History     Socioeconomic History   • Marital status:      Spouse name: Not on file   • Number of children: Not on file   • Years of education: Not on file   • Highest education level: Not on file   Tobacco Use   • Smoking status: Never Smoker   • Smokeless tobacco: Never Used   Vaping Use   • Vaping Use: Never used   Substance and Sexual Activity   • Alcohol use: No   • Drug use: No   • Sexual activity: Defer       Medications:   Current Outpatient Medications:   •  aspirin 325 MG tablet, Take 325 mg by mouth Daily., Disp: , Rfl:  "  •  Calcium Carbonate (CALCIUM 500 PO), Take  by mouth., Disp: , Rfl:   •  cetirizine (zyrTEC) 10 MG tablet, Take 10 mg by mouth Daily., Disp: , Rfl:   •  lansoprazole (PREVACID) 15 MG capsule, Take 15 mg by mouth Daily., Disp: , Rfl:   •  LORazepam (ATIVAN) 0.5 MG tablet, Take 1 tablet by mouth 2 (Two) Times a Day As Needed for Anxiety., Disp: 60 tablet, Rfl: 3  •  metoprolol succinate XL (TOPROL-XL) 50 MG 24 hr tablet, Take 1 tablet by mouth Daily., Disp: 30 tablet, Rfl: 6  •  rosuvastatin (CRESTOR) 10 MG tablet, Take 1 tablet by mouth Daily., Disp: 30 tablet, Rfl: 6  •  venlafaxine XR (Effexor XR) 37.5 MG 24 hr capsule, Take 1 capsule by mouth Daily., Disp: 30 capsule, Rfl: 6  •  venlafaxine XR (EFFEXOR-XR) 75 MG 24 hr capsule, Take 1 capsule by mouth Daily., Disp: 30 capsule, Rfl: 6  •  VITAMIN D PO, Take  by mouth., Disp: , Rfl:   •  HYDROcodone-acetaminophen (Norco)  MG per tablet, Take 1 tablet by mouth Every 6 (Six) Hours As Needed for Severe Pain  for up to 7 days., Disp: 28 tablet, Rfl: 0    Allergies: No Known Allergies    The following portions of the patient's history were reviewed and updated as appropriate: allergies, current medications, past family history, past medical history, past social history, past surgical history and problem list.        Objective    Objective      Vital Signs:   Vitals:    09/09/21 0928   BP: 145/82   Weight: 62.6 kg (138 lb 0.1 oz)   Height: 157.5 cm (62.01\")       Ortho Exam:  Right knee essentially no pain at the fracture site able to flex and extend with excellent motion no pain.  No skin changes.  Left shoulder has some mild popping but no obvious crepitus at the fracture site pain much better no bruising    Results Review:  Imaging Results (Last 24 Hours)     Procedure Component Value Units Date/Time    XR Shoulder 2+ View Left [[id number]] Resulted: 09/09/21 1000     Updated: 09/09/21 1001    Narrative:      Imaging: shoulder x-rays 3 views    Side: Left " shoulder    Indication for shoulder x-ray 3 views: shoulder pain    Comparison: Prior clinic comparison views available    Findings:   Complex proximal humerus fracture stable alignment compared to prior.    Interval healing noted with callus formation noted on these images.    I personally reviewed the above x-rays and discussed with the patient.    XR Knee 3+ View With [place] Right [[id number]] Resulted: 09/09/21 0959     Updated: 09/09/21 1000    Narrative:      Right knee 3 views, indication patellar fracture, comparison to prior   images      Findings: Right knee patella fracture maintains excellent alignment no   displacement compared to prior images.          XR Shoulder 2+ View Left    Result Date: 8/19/2021  Comminuted humeral neck fracture with extension into the greater tuberosity.  D:  08/18/2021 E:  08/18/2021  This report was finalized on 8/19/2021 11:31 AM by Dr. Izabel Franco MD.      XR Knee 1 or 2 View Right    Result Date: 8/19/2021  There is evidence of a joint effusion with findings concerning for possible fracture of the patella on AP imaging.  D:  08/18/2021 E:  08/18/2021  This report was finalized on 8/19/2021 11:31 AM by Dr. Izabel Franco MD.      CT Lower Extremity Right Without Contrast    Result Date: 8/19/2021  2 nondisplaced fractures involving the patella. There is a large joint effusion identified of the suprapatella bursa with no significant degenerative changes present. Only mild lateral tilt of the patella in respect to the trochlear groove.  D:  08/18/2021 E:  08/18/2021  This report was finalized on 8/19/2021 11:31 AM by Dr. Izabel Franco MD.      CT Upper Extremity Left Without Contrast    Result Date: 8/19/2021  Comminuted and mildly impacted humeral neck fracture with extension to the greater tuberosity. The articular surface is intact.  D:  08/18/2021 E:  08/18/2021   This report was finalized on 8/19/2021 11:31 AM by Dr. Izabel Franco MD.             Procedures            Assessment / Plan      Assessment/Plan:   Problem List Items Addressed This Visit        Musculoskeletal and Injuries    Closed 3-part fracture of proximal end of left humerus - Primary    Relevant Medications    HYDROcodone-acetaminophen (Norco)  MG per tablet    Closed nondisplaced comminuted fracture of right patella    Relevant Medications    HYDROcodone-acetaminophen (Norco)  MG per tablet      Other Visit Diagnoses     Right knee pain, unspecified chronicity        Relevant Medications    HYDROcodone-acetaminophen (Norco)  MG per tablet    Other Relevant Orders    XR Knee 3+ View With [place] Right (Completed)    Left shoulder pain, unspecified chronicity        Relevant Medications    HYDROcodone-acetaminophen (Norco)  MG per tablet    Other Relevant Orders    XR Shoulder 2+ View Left (Completed)        Left shoulder counseled again on therapy, right knee counseled on therapy, pain medicine provided to help with specially with night pain participation with physical therapy.  Will remain nonweightbearing left upper extremity    Follow Up: 3 weeks-left shoulder 3 views and right knee 3 views at next visit in 3 weeks      Kem Mark MD, FAAOS  Orthopedic Surgeon  Fellowship Trained Shoulder and Elbow Surgeon  [facility]  Orthopedics and Sports Medicine  [address] [place] [address], [address]  [place] [address]    09/09/21  10:14 EDT    Please note that portions of this note may have been completed with a voice recognition program. Efforts were made to edit the dictations, but occasionally words are mistranscribed.

## 2021-10-05 ENCOUNTER — OFFICE VISIT (OUTPATIENT)
Dept: ORTHOPEDIC SURGERY | Facility: CLINIC | Age: 56
End: 2021-10-05

## 2021-10-05 VITALS
HEIGHT: 62 IN | HEART RATE: 63 BPM | SYSTOLIC BLOOD PRESSURE: 136 MMHG | WEIGHT: 138.01 LBS | DIASTOLIC BLOOD PRESSURE: 86 MMHG | BODY MASS INDEX: 25.4 KG/M2

## 2021-10-05 DIAGNOSIS — S42.292A CLOSED 3-PART FRACTURE OF PROXIMAL END OF LEFT HUMERUS, INITIAL ENCOUNTER: ICD-10-CM

## 2021-10-05 DIAGNOSIS — M25.512 LEFT SHOULDER PAIN, UNSPECIFIED CHRONICITY: ICD-10-CM

## 2021-10-05 DIAGNOSIS — S82.044A CLOSED NONDISPLACED COMMINUTED FRACTURE OF RIGHT PATELLA, INITIAL ENCOUNTER: ICD-10-CM

## 2021-10-05 DIAGNOSIS — Z09 FRACTURE FOLLOW-UP: Primary | ICD-10-CM

## 2021-10-05 PROCEDURE — 99213 OFFICE O/P EST LOW 20 MIN: CPT | Performed by: ORTHOPAEDIC SURGERY

## 2021-10-05 RX ORDER — HYDROCODONE BITARTRATE AND ACETAMINOPHEN 5; 325 MG/1; MG/1
1 TABLET ORAL 2 TIMES DAILY PRN
Qty: 28 TABLET | Refills: 0 | Status: SHIPPED | OUTPATIENT
Start: 2021-10-05 | End: 2021-10-19

## 2021-10-05 NOTE — PROGRESS NOTES
[facility] Follow Up       Office Follow Up Visit       Patient Name: Nicole Cannon    Chief Complaint:   Chief Complaint   Patient presents with   • Follow-up     3 week f/u, 5 weeks after Closed 3-part fracture of proximal end of left humerus and Closed nondisplaced comminuted fracture of right patella (DOI: 8/18/2021)       Referring Physician: No ref. provider found    History of Present Illness:   It has been 3  week(s) since Nicole Cannon's last visit. Nicole Cannon returns to clinic today for F/U: follow-up of rightBody Part: kneeReason: fracture and Left humerus fracture. The issue has been ongoing for 7 weeks. Nicole Cannon rates HIS/HER: herpain at 3/10 on the pain scale. Previous/current treatments: physical therapy. Current symptoms:Symptoms: popping and stiffness. The pain is worse with climbing stairs, lying on affected side and any movement of the joint; resting, heat and pain medication and/or NSAID improves the pain. Overall, he/she: sheis doing better.  I have reviewed the patient's history of present illness as noted/entered above.    I have reviewed the patient's past medical history, surgical history, social history, family history, medications, and allergies as noted in the electronic medical record and as noted/entered.  I have reviewed the patient's review of systems as noted/enter and updated as noted in the patient's HPI.    Right nondisplaced patellar fracture, comminuted left proximal humerus fracture     One of my best friends, Arias's, mother-in-law --excellent family  Visiting grandchildren in [place], tripped over the dog  DOI: 8/18/2021  MYNOR: tripped over dog (Rosaura), holding grandson Will  Will (2yr) and [place]      PT pros [place]     10/5/2021:  Left shoulder and right patella doing better.  7 weeks post injury  Right knee doing excellent left shoulder improving working hard with  PT       9/9/2021:  Subjective improvements, self weaned out of the knee brace but has done well with that and is working with physical therapy for the knee and the shoulder     Right nondisplaced patellar fracture, comminuted left proximal humerus fracture     One of my best friends, Hillarys, mother-in-law --excellent family  Visiting grandchildren in [place], tripped over the dog  DOI: 8/18/2021  MYNOR: tripped over dog, holding grandson Will  Will (2yr) and [facility] note reviewed from 8/18/2021     COMPLEX TRAUMA     8/26/2021:  Pain rx helping, bracing intact, sling LUE  Counseled on recovery      Subjective   Subjective      Review of Systems   Musculoskeletal: Positive for arthralgias.   All other systems reviewed and are negative.       Past Medical History:   Past Medical History:   Diagnosis Date   • Arrhythmia    • CRPS (complex regional pain syndrome) type I of lower limb    • Dysfunctional uterine bleeding    • Dysmenorrhea    • Foot fracture    • Hand pain    • Hyperlipidemia    • Itching    • Low back pain    • Lump of skin    • Seborrheic keratoses        Past Surgical History: No past surgical history on file.    Family History:   Family History   Problem Relation Age of Onset   • Diabetes Mother    • Hyperlipidemia Mother    • Hypertension Mother    • Thyroid disease Mother    • Hyperlipidemia Father    • Cancer Father         PROSTATE       Social History:   Social History     Socioeconomic History   • Marital status:      Spouse name: Not on file   • Number of children: Not on file   • Years of education: Not on file   • Highest education level: Not on file   Tobacco Use   • Smoking status: Never Smoker   • Smokeless tobacco: Never Used   Vaping Use   • Vaping Use: Never used   Substance and Sexual Activity   • Alcohol use: No   • Drug use: No   • Sexual activity: Defer       Medications:   Current Outpatient Medications:   •  aspirin 325 MG tablet, Take 325 mg by  "mouth Daily., Disp: , Rfl:   •  Calcium Carbonate (CALCIUM 500 PO), Take  by mouth., Disp: , Rfl:   •  cetirizine (zyrTEC) 10 MG tablet, Take 10 mg by mouth Daily., Disp: , Rfl:   •  lansoprazole (PREVACID) 15 MG capsule, Take 15 mg by mouth Daily., Disp: , Rfl:   •  LORazepam (ATIVAN) 0.5 MG tablet, Take 1 tablet by mouth 2 (Two) Times a Day As Needed for Anxiety., Disp: 60 tablet, Rfl: 3  •  metoprolol succinate XL (TOPROL-XL) 50 MG 24 hr tablet, Take 1 tablet by mouth Daily., Disp: 30 tablet, Rfl: 6  •  rosuvastatin (CRESTOR) 10 MG tablet, Take 1 tablet by mouth Daily., Disp: 30 tablet, Rfl: 6  •  venlafaxine XR (Effexor XR) 37.5 MG 24 hr capsule, Take 1 capsule by mouth Daily., Disp: 30 capsule, Rfl: 6  •  venlafaxine XR (EFFEXOR-XR) 75 MG 24 hr capsule, Take 1 capsule by mouth Daily., Disp: 30 capsule, Rfl: 6  •  VITAMIN D PO, Take  by mouth., Disp: , Rfl:   •  HYDROcodone-acetaminophen (NORCO) 5-325 MG per tablet, Take 1 tablet by mouth 2 (Two) Times a Day As Needed for Severe Pain  for up to 14 days., Disp: 28 tablet, Rfl: 0    Allergies: No Known Allergies    The following portions of the patient's history were reviewed and updated as appropriate: allergies, current medications, past family history, past medical history, past social history, past surgical history and problem list.        Objective    Objective      Vital Signs:   Vitals:    10/05/21 1440   BP: 136/86   Pulse: 63   Weight: 62.6 kg (138 lb 0.1 oz)   Height: 157.5 cm (62.01\")       Ortho Exam:  Right knee no pain excellent strength no tenderness to palpation extensor mechanism intact    Left shoulder demonstrates much better active and passive range of motion no crepitus at the fracture site stable on exam    Results Review:  Imaging Results (Last 24 Hours)     Procedure Component Value Units Date/Time    XR Shoulder 2+ View Left [[id number]] Resulted: 10/05/21 1502     Updated: 10/05/21 1502    Narrative:      Imaging: shoulder x-rays 3 views - " AP, axillary, and scapular-Y x-ray   views    Side: LEFT SHOULDER    Indication for shoulder x-ray 3 views: shoulder pain    Comparison: Prior clinic comparison views available    Findings:   Left shoulder 3 views shows continued significant proximal humeral   fracture with initial displacement but stable alignment is still noted.    Interval callus formation is noted on multiple views.      I personally reviewed the above x-rays and discussed with the patient.      XR Knee 3+ View With [place] Right [[id number]] Resulted: 10/05/21 1501     Updated: 10/05/21 1502    Narrative:      Right knee 3 views  Indication: Comminuted fracture of the right patella  Comparison to prior clinic images  Findings: Right comminuted fracture of the patella healed with   satisfactory anatomic alignment.          XR Shoulder 2+ View Left    Result Date: 8/19/2021  Comminuted humeral neck fracture with extension into the greater tuberosity.  D:  08/18/2021 E:  08/18/2021  This report was finalized on 8/19/2021 11:31 AM by Dr. Izabel Franco MD.      XR Knee 1 or 2 View Right    Result Date: 8/19/2021  There is evidence of a joint effusion with findings concerning for possible fracture of the patella on AP imaging.  D:  08/18/2021 E:  08/18/2021  This report was finalized on 8/19/2021 11:31 AM by Dr. Izabel Franco MD.      CT Lower Extremity Right Without Contrast    Result Date: 8/19/2021  2 nondisplaced fractures involving the patella. There is a large joint effusion identified of the suprapatella bursa with no significant degenerative changes present. Only mild lateral tilt of the patella in respect to the trochlear groove.  D:  08/18/2021 E:  08/18/2021  This report was finalized on 8/19/2021 11:31 AM by Dr. Izabel Franco MD.      CT Upper Extremity Left Without Contrast    Result Date: 8/19/2021  Comminuted and mildly impacted humeral neck fracture with extension to the greater tuberosity. The articular surface is  intact.  D:  08/18/2021 E:  08/18/2021   This report was finalized on 8/19/2021 11:31 AM by Dr. Izabel Franco MD.          Procedures            Assessment / Plan      Assessment/Plan:   Problem List Items Addressed This Visit        Musculoskeletal and Injuries    Closed 3-part fracture of proximal end of left humerus    Relevant Medications    HYDROcodone-acetaminophen (NORCO) 5-325 MG per tablet    Other Relevant Orders    Ambulatory Referral to Physical Therapy Evaluate and treat, Ortho (Completed)    Closed nondisplaced comminuted fracture of right patella    Relevant Medications    HYDROcodone-acetaminophen (NORCO) 5-325 MG per tablet    Other Relevant Orders    Ambulatory Referral to Physical Therapy Evaluate and treat, Ortho (Completed)      Other Visit Diagnoses     Fracture follow-up    -  Primary    Relevant Medications    HYDROcodone-acetaminophen (NORCO) 5-325 MG per tablet    Other Relevant Orders    XR Knee 3+ View With [place] Right (Completed)    XR Shoulder 2+ View Left (Completed)    Ambulatory Referral to Physical Therapy Evaluate and treat, Ortho (Completed)    Left shoulder pain, unspecified chronicity        Relevant Medications    HYDROcodone-acetaminophen (NORCO) 5-325 MG per tablet    Other Relevant Orders    Ambulatory Referral to Physical Therapy Evaluate and treat, Ortho (Completed)        Complex polytrauma right knee doing better, left shoulder gradually improving still looking to see more signs of radiographic callus and consolidation.  Clinically doing much better, physical therapy recommended    Counseled on PT to primarily focus on active assist and passive motion for range of motion preservation, gradual healing noted radiographically    Desired additional pain medication reasonable at this time to help with PT and recovery.    Follow Up: 4 weeks with left shoulder 3 views      Kem Mark MD, FAAOS  Orthopedic Surgeon  Fellowship Trained Shoulder and Elbow  Surgeon  [facility]  Orthopedics and Sports Medicine  [facility], [address]  [place] [address]    10/05/21  15:23 EDT    Please note that portions of this note may have been completed with a voice recognition program. Efforts were made to edit the dictations, but occasionally words are mistranscribed.

## 2021-10-11 DIAGNOSIS — E78.2 MIXED HYPERLIPIDEMIA: Chronic | ICD-10-CM

## 2021-10-11 DIAGNOSIS — F41.1 GENERALIZED ANXIETY DISORDER: ICD-10-CM

## 2021-10-11 RX ORDER — ROSUVASTATIN CALCIUM 10 MG/1
10 TABLET, COATED ORAL DAILY
Qty: 30 TABLET | Refills: 6 | Status: SHIPPED | OUTPATIENT
Start: 2021-10-11 | End: 2022-02-28 | Stop reason: SDUPTHER

## 2021-10-11 RX ORDER — VENLAFAXINE HYDROCHLORIDE 37.5 MG/1
37.5 CAPSULE, EXTENDED RELEASE ORAL DAILY
Qty: 30 CAPSULE | Refills: 6 | Status: SHIPPED | OUTPATIENT
Start: 2021-10-11 | End: 2021-10-28 | Stop reason: SDUPTHER

## 2021-10-11 RX ORDER — VENLAFAXINE HYDROCHLORIDE 75 MG/1
75 CAPSULE, EXTENDED RELEASE ORAL DAILY
Qty: 30 CAPSULE | Refills: 6 | Status: SHIPPED | OUTPATIENT
Start: 2021-10-11 | End: 2022-02-28 | Stop reason: SDUPTHER

## 2021-10-11 NOTE — TELEPHONE ENCOUNTER
Caller: Nicole Cannon    Relationship: Self      Medication requested (name and dosage):   venlafaxine XR (EFFEXOR-XR) 75 MG 24 hr capsule  venlafaxine XR (Effexor XR) 37.5 MG 24 hr capsule  rosuvastatin (CRESTOR) 10 MG tablet  Pharmacy where request should be sent:   [facility] [address]  Additional details provided by patient:   PATIENT IS REQUESTING REFILLS ON ABOVE MEDICATIONS    Best call back number: [phone]     Does the patient have less than a 3 day supply:  [x] Yes  [] No    Tyler Crain   10/11/21 09:50 EDT

## 2021-10-19 ENCOUNTER — OFFICE VISIT (OUTPATIENT)
Dept: FAMILY MEDICINE CLINIC | Facility: CLINIC | Age: 56
End: 2021-10-19

## 2021-10-19 VITALS
HEART RATE: 58 BPM | OXYGEN SATURATION: 95 % | HEIGHT: 62 IN | DIASTOLIC BLOOD PRESSURE: 84 MMHG | WEIGHT: 134.8 LBS | TEMPERATURE: 97.7 F | SYSTOLIC BLOOD PRESSURE: 138 MMHG | BODY MASS INDEX: 24.8 KG/M2

## 2021-10-19 DIAGNOSIS — Z23 NEEDS FLU SHOT: ICD-10-CM

## 2021-10-19 DIAGNOSIS — Z12.11 COLON CANCER SCREENING: ICD-10-CM

## 2021-10-19 DIAGNOSIS — F41.1 GENERALIZED ANXIETY DISORDER: ICD-10-CM

## 2021-10-19 DIAGNOSIS — S42.292S CLOSED 3-PART FRACTURE OF PROXIMAL END OF LEFT HUMERUS, SEQUELA: Primary | ICD-10-CM

## 2021-10-19 DIAGNOSIS — Z78.0 POSTMENOPAUSAL: ICD-10-CM

## 2021-10-19 PROCEDURE — 90471 IMMUNIZATION ADMIN: CPT | Performed by: FAMILY MEDICINE

## 2021-10-19 PROCEDURE — 90686 IIV4 VACC NO PRSV 0.5 ML IM: CPT | Performed by: FAMILY MEDICINE

## 2021-10-19 PROCEDURE — 99213 OFFICE O/P EST LOW 20 MIN: CPT | Performed by: FAMILY MEDICINE

## 2021-10-19 RX ORDER — LORAZEPAM 0.5 MG/1
0.5 TABLET ORAL 2 TIMES DAILY PRN
Qty: 60 TABLET | Refills: 3 | Status: SHIPPED | OUTPATIENT
Start: 2021-10-19 | End: 2022-02-28 | Stop reason: SDUPTHER

## 2021-10-19 RX ORDER — VENLAFAXINE HYDROCHLORIDE 37.5 MG/1
37.5 CAPSULE, EXTENDED RELEASE ORAL DAILY
Qty: 30 CAPSULE | Refills: 6 | OUTPATIENT
Start: 2021-10-19

## 2021-10-19 NOTE — TELEPHONE ENCOUNTER
Caller: Nicole Cannon    Relationship: Self    Requested Prescriptions:   Requested Prescriptions     Pending Prescriptions Disp Refills   • venlafaxine XR (Effexor XR) 37.5 MG 24 hr capsule 30 capsule 6     Sig: Take 1 capsule by mouth Daily.        Pharmacy where request should be sent:  [facility]     Additional details provided by patient: PATIENT STATES SHE CALLED THE PHARMACY JUST NOW AND THEY DID NOT HAVE THIS PRESCRIPTION REFILL     Best call back number:  [phone]     Does the patient have less than a 3 day supply:  [x] Yes  [] No

## 2021-10-19 NOTE — PROGRESS NOTES
Subjective   Nicole Cannon is a 56 y.o. female.     History of Present Illness follow-up.  Since last here unfortunately had an accidental fall fractured her left proximal humerus as well as right patella.  Has been following with orthopedics in [place].  Has initiated physical therapy on the shoulder.  Feels like he has significantly improved overall.  Utilizing all medications as reconciled.  Globally doing better.  From a general health standpoint has had mammogram.  Has GYN visit coming up soon.  Is interested in colon cancer screening now.  Flu shot today.  Medication management.  Bone density study.    The following portions of the patient's history were reviewed and updated as appropriate: allergies, current medications, past family history, past social history, past surgical history and problem list.    Review of Systems  See history of Present Illness     Objective     Physical Exam  Vitals reviewed.   Constitutional:       Appearance: Normal appearance.   Neurological:      Mental Status: She is alert and oriented to person, place, and time.         PHQ-9 Total Score:      Body mass index is 24.65 kg/m².       Assessment/Plan     Diagnoses and all orders for this visit:    1. Closed 3-part fracture of proximal end of left humerus, sequela (Primary)    2. Colon cancer screening  -     Cologuard - Stool, Per Rectum; Future    3. Needs flu shot  -     FluLaval/Fluarix/Fluzone >6 Months    4. Postmenopausal  -     DEXA Bone Density Axial; Future    5. Generalized anxiety disorder  -     LORazepam (ATIVAN) 0.5 MG tablet; Take 1 tablet by mouth 2 (Two) Times a Day As Needed for Anxiety.  Dispense: 60 tablet; Refill: 3    Overall you seem to be progressing well after this orthopedic insult. We will continue medications as reconciled ordered. Flu shot today. Will order Cologuard for colon cancer screening. Ask for bone density study. Keep follow-up at orthopedics as well as upcoming gynecology. Recheck here in  about 4 months or as needed. Maintain heart healthy low-cholesterol diet. You plan to reschedule visit with cardiology. Maintain pandemic response and of course participate in physical therapy.    As part of this patient's treatment plan I am prescribing controlled substances. The patient has been made aware of appropriate use of such medications, including potential risk of somnolence, limited ability to drive and/or work safely, and potential for dependence or overdose. It has also been made clear that these medications are for use by this patient only, without concomitant use of alcohol or other substances unless prescribed.  Patient has completed prescribing agreement detailing terms of continued prescribing of controlled substances, including monitoring JINNY reports, urine drug screening, and pill counts if necessary. The patient is aware that inappropriate use will results in cessation of prescribing such medications.  JINNY report has been reviewed.  JINNY is updated every 3 months.  History and physical exam exhibit continued safe and appropriate use of controlled substances.                        This document has been electronically signed by Erickson Lira MD   October 19, 2021 12:14 EDT    Part of this note may be an electronic transcription/translation of spoken language to printed text using the Dragon Dictation System.

## 2021-10-27 ENCOUNTER — TELEPHONE (OUTPATIENT)
Dept: FAMILY MEDICINE CLINIC | Facility: CLINIC | Age: 56
End: 2021-10-27

## 2021-10-27 DIAGNOSIS — F41.1 GENERALIZED ANXIETY DISORDER: Primary | ICD-10-CM

## 2021-10-27 NOTE — TELEPHONE ENCOUNTER
Called [facility] and spoke with pharmacist who stated they did not received the 37.5 Effexor script please resend.

## 2021-10-27 NOTE — TELEPHONE ENCOUNTER
Caller: Nicole Cannon    Relationship: Self      Medication requested (name and dosage): venlafaxine XR (Effexor XR) 37.5 MG 24 hr capsule    Requested Prescriptions:   Requested Prescriptions      No prescriptions requested or ordered in this encounter        Pharmacy where request should be sent: [id number] - [place] - [address] [facility] - [phone] [facility] [phone]   [phone]    Additional details provided by patient: OUT OF MEDICATION. PATIENT STATES SHE WAS TOLD THIS PRESCRIPTION HAD BEEN SENT TO THE PHARMACY ON 10/19/21 AT LAST APPOINTMENT. PATIENT STATES SHE SPOKE WITH THE PHARMACY THIS MORNING 10/27/21 AND THE PRESCRIPTION REQUEST HAS STILL NOT BEEN RECEIVED.     Best call back number: [phone]     Does the patient have less than a 3 day supply:  [x] Yes  [] No    June Hopson Rep   10/27/21 11:08 EDT

## 2021-10-28 RX ORDER — VENLAFAXINE HYDROCHLORIDE 37.5 MG/1
37.5 CAPSULE, EXTENDED RELEASE ORAL DAILY
Qty: 30 CAPSULE | Refills: 6 | Status: SHIPPED | OUTPATIENT
Start: 2021-10-28 | End: 2022-02-28 | Stop reason: SDUPTHER

## 2021-11-02 ENCOUNTER — OFFICE VISIT (OUTPATIENT)
Dept: ORTHOPEDIC SURGERY | Facility: CLINIC | Age: 56
End: 2021-11-02

## 2021-11-02 VITALS
BODY MASS INDEX: 24.79 KG/M2 | DIASTOLIC BLOOD PRESSURE: 71 MMHG | HEART RATE: 58 BPM | SYSTOLIC BLOOD PRESSURE: 125 MMHG | HEIGHT: 62 IN | WEIGHT: 134.7 LBS

## 2021-11-02 DIAGNOSIS — Z09 FRACTURE FOLLOW-UP: Primary | ICD-10-CM

## 2021-11-02 DIAGNOSIS — M25.561 RIGHT KNEE PAIN, UNSPECIFIED CHRONICITY: ICD-10-CM

## 2021-11-02 DIAGNOSIS — S82.044A CLOSED NONDISPLACED COMMINUTED FRACTURE OF RIGHT PATELLA, INITIAL ENCOUNTER: ICD-10-CM

## 2021-11-02 DIAGNOSIS — S42.292A CLOSED 3-PART FRACTURE OF PROXIMAL END OF LEFT HUMERUS, INITIAL ENCOUNTER: ICD-10-CM

## 2021-11-02 DIAGNOSIS — M25.512 LEFT SHOULDER PAIN, UNSPECIFIED CHRONICITY: ICD-10-CM

## 2021-11-02 PROCEDURE — 99213 OFFICE O/P EST LOW 20 MIN: CPT | Performed by: ORTHOPAEDIC SURGERY

## 2021-11-02 NOTE — PROGRESS NOTES
[facility] [place] [facility] Follow Up       Office Follow Up Visit       Patient Name: Nicole Cannon    Chief Complaint:   Chief Complaint   Patient presents with   • Follow-up     4 week f/u after con'td PT tx at [facility], Closed 3-part fracture of proximal end of left humerus, sequela and Closed nondisplaced comminuted fracture of right patella  DOI: 8/18/2021)       Referring Physician: No ref. provider found    History of Present Illness:   It has been 4  week(s) since Nicole Cannon's last visit. Nicole Cannon returns to clinic today for F/U: follow-up of leftBody Part: humerusReason: fracture and right knee patella fracture. The issue has been ongoing for 2.5 month(s). Nicole Cannon rates HIS/HER: herpain at 3/10 on the pain scale. Previous/current treatments: bracing and physical therapy. Current symptoms:Symptoms: pain and popping in shoulder. The pain is worse with lying on affected side and reaching behind with shoulder, knee pain increases with direct pressure and squatting; heat improves the pain. Overall, he/she: sheis doing better. I have reviewed the patient's history of present illness as noted/entered above.    I have reviewed the patient's past medical history, surgical history, social history, family history, medications, and allergies as noted in the electronic medical record and as noted/entered.  I have reviewed the patient's review of systems as noted/enter and updated as noted in the patient's HPI.      Right nondisplaced patellar fracture, comminuted left proximal humerus fracture     One of my best friends, Bianchi's, mother-in-law --excellent family  Visiting grandchildren in [place], tripped over the dog  DOI: 8/18/2021  MYNOR: tripped over dog (Rosaura), holding grandson Will  Will (2yr) and [place]  Daughter - Elza        PT pros [facility]    11/2/2021:  11 weeks post-injury  Doing better  Some pain with direct pressure  over the patella on the right, still some diminished internal rotation with the left shoulder which is not unexpected given the significant trauma but continued healing noted radiographically       10/5/2021:  Left shoulder and right patella doing better.  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 [place]; Homar has 5 boys (two sets of twins)        9/9/2021:  Subjective improvements, self weaned out of the knee brace but has done well with that and is working with physical therapy for the knee and the shoulder     Right nondisplaced patellar fracture, comminuted left proximal humerus fracture     One of my best friends, Arias's, mother-in-law --excellent family  Visiting grandchildren in [place], tripped over the dog  DOI: 8/18/2021  MYNOR: tripped over dog, holding grandson Will  Will (2yr) and [facility] note reviewed from 8/18/2021     COMPLEX TRAUMA     8/26/2021:  Pain rx helping, bracing intact, sling LUE  Counseled on recovery      Subjective   Subjective      Review of Systems   Musculoskeletal: Positive for arthralgias.   All other systems reviewed and are negative.       Past Medical History:   Past Medical History:   Diagnosis Date   • Arrhythmia    • CRPS (complex regional pain syndrome) type I of lower limb    • Dysfunctional uterine bleeding    • Dysmenorrhea    • Foot fracture    • Hand pain    • Hyperlipidemia    • Itching    • Low back pain    • Lump of skin    • Seborrheic keratoses        Past Surgical History: No past surgical history on file.    Family History:   Family History   Problem Relation Age of Onset   • Diabetes Mother    • Hyperlipidemia Mother    • Hypertension Mother    • Thyroid disease Mother    • Hyperlipidemia Father    • Cancer Father         PROSTATE       Social History:   Social History     Socioeconomic History   • Marital status:   "  Tobacco Use   • Smoking status: Never Smoker   • Smokeless tobacco: Never Used   Vaping Use   • Vaping Use: Never used   Substance and Sexual Activity   • Alcohol use: No   • Drug use: No   • Sexual activity: Defer       Medications:   Current Outpatient Medications:   •  aspirin 325 MG tablet, Take 325 mg by mouth Daily., Disp: , Rfl:   •  Calcium Carbonate (CALCIUM 500 PO), Take  by mouth., Disp: , Rfl:   •  cetirizine (zyrTEC) 10 MG tablet, Take 10 mg by mouth Daily., Disp: , Rfl:   •  lansoprazole (PREVACID) 15 MG capsule, Take 15 mg by mouth Daily., Disp: , Rfl:   •  LORazepam (ATIVAN) 0.5 MG tablet, Take 1 tablet by mouth 2 (Two) Times a Day As Needed for Anxiety., Disp: 60 tablet, Rfl: 3  •  metoprolol succinate XL (TOPROL-XL) 50 MG 24 hr tablet, Take 1 tablet by mouth Daily., Disp: 30 tablet, Rfl: 6  •  rosuvastatin (CRESTOR) 10 MG tablet, Take 1 tablet by mouth Daily., Disp: 30 tablet, Rfl: 6  •  venlafaxine XR (Effexor XR) 37.5 MG 24 hr capsule, Take 1 capsule by mouth Daily., Disp: 30 capsule, Rfl: 6  •  venlafaxine XR (EFFEXOR-XR) 75 MG 24 hr capsule, Take 1 capsule by mouth Daily., Disp: 30 capsule, Rfl: 6  •  VITAMIN D PO, Take  by mouth., Disp: , Rfl:     Allergies: No Known Allergies    The following portions of the patient's history were reviewed and updated as appropriate: allergies, current medications, past family history, past medical history, past social history, past surgical history and problem list.        Objective    Objective      Vital Signs:   Vitals:    11/02/21 1343   BP: 125/71   Pulse: 58   Weight: 61.1 kg (134 lb 11.2 oz)   Height: 157.5 cm (62.01\")       Ortho Exam:  Right knee demonstrates excellent strength good quad strength no pain at the patella    Left shoulder demonstrates improved active and passive range of motion with good strength at this point.  Well-appearing despite significant trauma    Results Review:  Imaging Results (Last 24 Hours)     Procedure Component Value " Units Date/Time    XR Shoulder 2+ View Left [[id number]] Resulted: 11/02/21 1401     Updated: 11/02/21 1402    Narrative:      Imaging: shoulder x-rays 3 views - AP, axillary, and scapular-Y x-ray   views    Side: Left shoulder    Indication for shoulder x-ray 3 views: shoulder pain    Comparison: Prior clinic comparison views available    Findings:   Stable alignment of left three-part proximal humerus fracture compared to   prior imaging.  Overall satisfactory alignment and lateral based healing   noted.    I personally reviewed the above x-rays and discussed with the patient.            Procedures            Assessment / Plan      Assessment/Plan:   Problem List Items Addressed This Visit        Musculoskeletal and Injuries    Closed 3-part fracture of proximal end of left humerus    Relevant Orders    Ambulatory Referral to Physical Therapy Evaluate and treat, Ortho (Completed)    Closed nondisplaced comminuted fracture of right patella    Relevant Orders    Ambulatory Referral to Physical Therapy Evaluate and treat, Ortho (Completed)      Other Visit Diagnoses     Fracture follow-up    -  Primary    Relevant Orders    XR Shoulder 2+ View Left (Completed)    Ambulatory Referral to Physical Therapy Evaluate and treat, Ortho (Completed)    Left shoulder pain, unspecified chronicity        Relevant Orders    Ambulatory Referral to Physical Therapy Evaluate and treat, Ortho (Completed)    Right knee pain, unspecified chronicity        Relevant Orders    Ambulatory Referral to Physical Therapy Evaluate and treat, Ortho (Completed)        Left shoulder and right knee progress with weightbearing especially the right lower extremity the left shoulder will continue to progress physical therapy prescription provided to gradually progress with weightbearing    Follow Up: 2 months, LEFT SHOULDER 3 views      Kem Mark MD, FAAOS  Orthopedic Surgeon  Fellowship Trained Shoulder and Elbow Surgeon  [facility]  [place]  Orthopedics and Sports Medicine  [facility], [address]  [place] [address]    11/02/21  14:15 EDT    Please note that portions of this note may have been completed with a voice recognition program. Efforts were made to edit the dictations, but occasionally words are mistranscribed.

## 2021-11-05 ENCOUNTER — HOSPITAL ENCOUNTER (OUTPATIENT)
Dept: BONE DENSITY | Facility: HOSPITAL | Age: 56
Discharge: HOME | End: 2021-11-05

## 2021-11-05 ENCOUNTER — IMMUNIZATION (OUTPATIENT)
Dept: VACCINE CLINIC | Facility: HOSPITAL | Age: 56
End: 2021-11-05

## 2021-11-05 DIAGNOSIS — Z78.0 POSTMENOPAUSAL: ICD-10-CM

## 2021-11-05 PROCEDURE — 0004A ADM SARSCOV2 30MCG/0.3ML BOOSTER: CPT | Performed by: INTERNAL MEDICINE

## 2021-11-05 PROCEDURE — 91300 HC SARSCOV02 VAC 30MCG/0.3ML IM: CPT | Performed by: INTERNAL MEDICINE

## 2021-11-05 PROCEDURE — 77080 DXA BONE DENSITY AXIAL: CPT | Performed by: RADIOLOGY

## 2021-11-05 PROCEDURE — 77080 DXA BONE DENSITY AXIAL: CPT

## 2021-11-08 NOTE — PROGRESS NOTES
Bone mineral density shows very slightly abnormal degree of osteopenia or bone weakening.  Nowhere near osteoporosis.  I would encourage activity as tolerated but also calcium along with vitamin D supplementation.  Repeat the test in 2 years.

## 2022-02-07 ENCOUNTER — TELEPHONE (OUTPATIENT)
Dept: FAMILY MEDICINE CLINIC | Facility: CLINIC | Age: 57
End: 2022-02-07

## 2022-02-07 NOTE — TELEPHONE ENCOUNTER
HUB OK TO RESCHEDULE    TRIED CALLING PATIENT TO RESCHEDULE APPOINTMENT ON 2/21/22 AS DR URIBE WILL BE OUT OF OFFICE.

## 2022-02-28 ENCOUNTER — OFFICE VISIT (OUTPATIENT)
Dept: FAMILY MEDICINE CLINIC | Facility: CLINIC | Age: 57
End: 2022-02-28

## 2022-02-28 VITALS
HEART RATE: 86 BPM | TEMPERATURE: 97.5 F | WEIGHT: 140 LBS | SYSTOLIC BLOOD PRESSURE: 135 MMHG | OXYGEN SATURATION: 96 % | DIASTOLIC BLOOD PRESSURE: 93 MMHG | BODY MASS INDEX: 25.76 KG/M2 | HEIGHT: 62 IN | RESPIRATION RATE: 16 BRPM

## 2022-02-28 DIAGNOSIS — F41.1 GENERALIZED ANXIETY DISORDER: ICD-10-CM

## 2022-02-28 DIAGNOSIS — I10 ESSENTIAL HYPERTENSION: ICD-10-CM

## 2022-02-28 DIAGNOSIS — I47.1 PAROXYSMAL SVT (SUPRAVENTRICULAR TACHYCARDIA): Primary | Chronic | ICD-10-CM

## 2022-02-28 DIAGNOSIS — I47.1 SVT (SUPRAVENTRICULAR TACHYCARDIA): ICD-10-CM

## 2022-02-28 DIAGNOSIS — E78.2 MIXED HYPERLIPIDEMIA: ICD-10-CM

## 2022-02-28 PROCEDURE — 80053 COMPREHEN METABOLIC PANEL: CPT | Performed by: FAMILY MEDICINE

## 2022-02-28 PROCEDURE — 80061 LIPID PANEL: CPT | Performed by: FAMILY MEDICINE

## 2022-02-28 PROCEDURE — 82550 ASSAY OF CK (CPK): CPT | Performed by: FAMILY MEDICINE

## 2022-02-28 PROCEDURE — 99214 OFFICE O/P EST MOD 30 MIN: CPT | Performed by: FAMILY MEDICINE

## 2022-02-28 PROCEDURE — 85025 COMPLETE CBC W/AUTO DIFF WBC: CPT | Performed by: FAMILY MEDICINE

## 2022-02-28 RX ORDER — LORAZEPAM 0.5 MG/1
0.5 TABLET ORAL 2 TIMES DAILY PRN
Qty: 60 TABLET | Refills: 3 | Status: SHIPPED | OUTPATIENT
Start: 2022-02-28 | End: 2022-06-13 | Stop reason: SDUPTHER

## 2022-02-28 RX ORDER — ROSUVASTATIN CALCIUM 10 MG/1
10 TABLET, COATED ORAL DAILY
Qty: 30 TABLET | Refills: 6 | Status: SHIPPED | OUTPATIENT
Start: 2022-02-28 | End: 2022-08-15 | Stop reason: SDUPTHER

## 2022-02-28 RX ORDER — METOPROLOL SUCCINATE 50 MG/1
50 TABLET, EXTENDED RELEASE ORAL
Qty: 30 TABLET | Refills: 6 | Status: SHIPPED | OUTPATIENT
Start: 2022-02-28 | End: 2022-08-15 | Stop reason: SDUPTHER

## 2022-02-28 RX ORDER — VENLAFAXINE HYDROCHLORIDE 37.5 MG/1
37.5 CAPSULE, EXTENDED RELEASE ORAL DAILY
Qty: 30 CAPSULE | Refills: 6 | Status: SHIPPED | OUTPATIENT
Start: 2022-02-28 | End: 2022-06-13 | Stop reason: DRUGHIGH

## 2022-02-28 RX ORDER — VENLAFAXINE HYDROCHLORIDE 75 MG/1
75 CAPSULE, EXTENDED RELEASE ORAL DAILY
Qty: 30 CAPSULE | Refills: 6 | Status: SHIPPED | OUTPATIENT
Start: 2022-02-28 | End: 2022-06-13 | Stop reason: DRUGHIGH

## 2022-03-01 LAB
ALBUMIN SERPL-MCNC: 5.1 G/DL (ref 3.5–5.2)
ALBUMIN/GLOB SERPL: 2 G/DL
ALP SERPL-CCNC: 101 U/L (ref 39–117)
ALT SERPL W P-5'-P-CCNC: 11 U/L (ref 1–33)
ANION GAP SERPL CALCULATED.3IONS-SCNC: 9 MMOL/L (ref 5–15)
AST SERPL-CCNC: 16 U/L (ref 1–32)
BASOPHILS # BLD AUTO: 0.03 10*3/MM3 (ref 0–0.2)
BASOPHILS NFR BLD AUTO: 0.5 % (ref 0–1.5)
BILIRUB SERPL-MCNC: 0.2 MG/DL (ref 0–1.2)
BUN SERPL-MCNC: 12 MG/DL (ref 6–20)
BUN/CREAT SERPL: 16.7 (ref 7–25)
CALCIUM SPEC-SCNC: 9.6 MG/DL (ref 8.6–10.5)
CHLORIDE SERPL-SCNC: 100 MMOL/L (ref 98–107)
CHOLEST SERPL-MCNC: 175 MG/DL (ref 0–200)
CK SERPL-CCNC: 45 U/L (ref 20–180)
CO2 SERPL-SCNC: 28 MMOL/L (ref 22–29)
CREAT SERPL-MCNC: 0.72 MG/DL (ref 0.57–1)
DEPRECATED RDW RBC AUTO: 42.1 FL (ref 37–54)
EGFRCR SERPLBLD CKD-EPI 2021: 98.3 ML/MIN/1.73
EOSINOPHIL # BLD AUTO: 0.07 10*3/MM3 (ref 0–0.4)
EOSINOPHIL NFR BLD AUTO: 1.2 % (ref 0.3–6.2)
ERYTHROCYTE [DISTWIDTH] IN BLOOD BY AUTOMATED COUNT: 12.2 % (ref 12.3–15.4)
GLOBULIN UR ELPH-MCNC: 2.6 GM/DL
GLUCOSE SERPL-MCNC: 95 MG/DL (ref 65–99)
HCT VFR BLD AUTO: 41.5 % (ref 34–46.6)
HDLC SERPL-MCNC: 51 MG/DL (ref 40–60)
HGB BLD-MCNC: 13.9 G/DL (ref 12–15.9)
IMM GRANULOCYTES # BLD AUTO: 0.02 10*3/MM3 (ref 0–0.05)
IMM GRANULOCYTES NFR BLD AUTO: 0.3 % (ref 0–0.5)
LDLC SERPL CALC-MCNC: 88 MG/DL (ref 0–100)
LDLC/HDLC SERPL: 1.58 {RATIO}
LYMPHOCYTES # BLD AUTO: 1.94 10*3/MM3 (ref 0.7–3.1)
LYMPHOCYTES NFR BLD AUTO: 32.6 % (ref 19.6–45.3)
MCH RBC QN AUTO: 31.2 PG (ref 26.6–33)
MCHC RBC AUTO-ENTMCNC: 33.5 G/DL (ref 31.5–35.7)
MCV RBC AUTO: 93 FL (ref 79–97)
MONOCYTES # BLD AUTO: 0.42 10*3/MM3 (ref 0.1–0.9)
MONOCYTES NFR BLD AUTO: 7.1 % (ref 5–12)
NEUTROPHILS NFR BLD AUTO: 3.47 10*3/MM3 (ref 1.7–7)
NEUTROPHILS NFR BLD AUTO: 58.3 % (ref 42.7–76)
NRBC BLD AUTO-RTO: 0 /100 WBC (ref 0–0.2)
PLATELET # BLD AUTO: 288 10*3/MM3 (ref 140–450)
PMV BLD AUTO: 10.2 FL (ref 6–12)
POTASSIUM SERPL-SCNC: 4.1 MMOL/L (ref 3.5–5.2)
PROT SERPL-MCNC: 7.7 G/DL (ref 6–8.5)
RBC # BLD AUTO: 4.46 10*6/MM3 (ref 3.77–5.28)
SODIUM SERPL-SCNC: 137 MMOL/L (ref 136–145)
TRIGL SERPL-MCNC: 217 MG/DL (ref 0–150)
VLDLC SERPL-MCNC: 36 MG/DL (ref 5–40)
WBC NRBC COR # BLD: 5.95 10*3/MM3 (ref 3.4–10.8)

## 2022-03-01 RX ORDER — LISINOPRIL 10 MG/1
10 TABLET ORAL DAILY
Qty: 30 TABLET | Refills: 6 | Status: SHIPPED | OUTPATIENT
Start: 2022-03-01 | End: 2022-08-15 | Stop reason: SDUPTHER

## 2022-03-01 NOTE — PROGRESS NOTES
Labs show good improvement.  Continue all medications same.  Please let her know I ordered an additional low dose of medication to take daily for blood pressure lisinopril.

## 2022-06-10 DIAGNOSIS — F41.1 GENERALIZED ANXIETY DISORDER: ICD-10-CM

## 2022-06-10 RX ORDER — LORAZEPAM 0.5 MG/1
0.5 TABLET ORAL 2 TIMES DAILY PRN
Qty: 60 TABLET | Refills: 3 | OUTPATIENT
Start: 2022-06-10

## 2022-06-10 NOTE — TELEPHONE ENCOUNTER
Patient called and notified she needed to schedule appt with Dr Davis to get medication she verbalized understanding and scheduled appt for Monday

## 2022-06-10 NOTE — TELEPHONE ENCOUNTER
Caller: Cannon Nicole K    Relationship: Self    Best call back number: [phone]    Requested Prescriptions:   Requested Prescriptions     Pending Prescriptions Disp Refills   • LORazepam (ATIVAN) 0.5 MG tablet 60 tablet 3     Sig: Take 1 tablet by mouth 2 (Two) Times a Day As Needed for Anxiety.        Pharmacy where request should be sent: [id number] - [place], [place] - [address] AT [facility] [phone] [address] [phone] FX     Additional details provided by patient: HAS 1 DAY LEFT    Does the patient have less than a 3 day supply:  [x] Yes  [] No    June Salinas Rep   06/10/22 10:56 EDT

## 2022-06-13 ENCOUNTER — OFFICE VISIT (OUTPATIENT)
Dept: FAMILY MEDICINE CLINIC | Facility: CLINIC | Age: 57
End: 2022-06-13

## 2022-06-13 VITALS
BODY MASS INDEX: 25.91 KG/M2 | TEMPERATURE: 98 F | HEIGHT: 62 IN | DIASTOLIC BLOOD PRESSURE: 82 MMHG | HEART RATE: 70 BPM | SYSTOLIC BLOOD PRESSURE: 131 MMHG | WEIGHT: 140.8 LBS | OXYGEN SATURATION: 97 %

## 2022-06-13 DIAGNOSIS — F41.1 GENERALIZED ANXIETY DISORDER: Primary | Chronic | ICD-10-CM

## 2022-06-13 DIAGNOSIS — I47.1 PAROXYSMAL SVT (SUPRAVENTRICULAR TACHYCARDIA): Chronic | ICD-10-CM

## 2022-06-13 DIAGNOSIS — Z51.81 MEDICATION MONITORING ENCOUNTER: ICD-10-CM

## 2022-06-13 DIAGNOSIS — I10 ESSENTIAL HYPERTENSION: Chronic | ICD-10-CM

## 2022-06-13 LAB
AMPHET+METHAMPHET UR QL: NEGATIVE
AMPHETAMINES UR QL: NEGATIVE
BARBITURATES UR QL SCN: NEGATIVE
BENZODIAZ UR QL SCN: NEGATIVE
BUPRENORPHINE SERPL-MCNC: NEGATIVE NG/ML
CANNABINOIDS SERPL QL: NEGATIVE
COCAINE UR QL: NEGATIVE
METHADONE UR QL SCN: NEGATIVE
OPIATES UR QL: NEGATIVE
OXYCODONE UR QL SCN: NEGATIVE
PCP UR QL SCN: NEGATIVE
PROPOXYPH UR QL: NEGATIVE
TRICYCLICS UR QL SCN: NEGATIVE

## 2022-06-13 PROCEDURE — 80306 DRUG TEST PRSMV INSTRMNT: CPT | Performed by: INTERNAL MEDICINE

## 2022-06-13 PROCEDURE — 99214 OFFICE O/P EST MOD 30 MIN: CPT | Performed by: INTERNAL MEDICINE

## 2022-06-13 RX ORDER — VENLAFAXINE HYDROCHLORIDE 150 MG/1
150 CAPSULE, EXTENDED RELEASE ORAL DAILY
Qty: 90 CAPSULE | Refills: 3 | Status: SHIPPED | OUTPATIENT
Start: 2022-06-13 | End: 2022-11-29

## 2022-06-13 RX ORDER — LORAZEPAM 0.5 MG/1
0.5 TABLET ORAL 2 TIMES DAILY PRN
Qty: 60 TABLET | Refills: 2 | Status: SHIPPED | OUTPATIENT
Start: 2022-06-13 | End: 2022-08-15 | Stop reason: SDUPTHER

## 2022-06-13 NOTE — PROGRESS NOTES
Patient Name: Nicole Cannon Today's Date: 2022   Patient MRN / CSN: [id number] / [id number] Date of Encounter: 2022   Patient Age / : 56 y.o. / 1965 Encounter Provider: Swathi Davis DO   Referring Physician: No ref. provider found          Nicole is a 56 y.o. female who is being seen today for Med Refill and Transitional Care Management      Nicole presents today to transition care from Dr. Lira to myself.  She has anxiety which she reports having for years.  She is taking Effexor and Ativan for this.  She reports tolerating her current medicine regimen well.  She does not feel that her anxiety is well controlled, despite this regimen.  Her last dose of Ativan was yesterday.  She typically needs it most days and sometimes twice daily.    Nicole has a history of SVT and hypertension.  Her blood pressure is well controlled with her current regimen.  She denies any recent episodes of SVT that she is aware of.  She reports tolerating metoprolol and lisinopril well.  She denies any chest pain or shortness of air.      Allergies include:Patient has no known allergies.  Current Outpatient Medications   Medication Sig Dispense Refill   • Calcium Carbonate (CALCIUM 500 PO) Take  by mouth.     • cetirizine (zyrTEC) 10 MG tablet Take 10 mg by mouth Daily.     • lansoprazole (PREVACID) 15 MG capsule Take 15 mg by mouth Daily.     • lisinopril (PRINIVIL,ZESTRIL) 10 MG tablet Take 1 tablet by mouth Daily. 30 tablet 6   • LORazepam (ATIVAN) 0.5 MG tablet Take 1 tablet by mouth 2 (Two) Times a Day As Needed for Anxiety. 60 tablet 2   • metoprolol succinate XL (TOPROL-XL) 50 MG 24 hr tablet Take 1 tablet by mouth Daily. 30 tablet 6   • rosuvastatin (CRESTOR) 10 MG tablet Take 1 tablet by mouth Daily. 30 tablet 6   • VITAMIN D PO Take  by mouth.     • venlafaxine XR (Effexor XR) 150 MG 24 hr capsule Take 1 capsule by mouth Daily. 90 capsule 3     No current facility-administered medications for this visit.  "    Past Medical History:   Diagnosis Date   • Arrhythmia    • CRPS (complex regional pain syndrome) type I of lower limb    • Dysfunctional uterine bleeding    • Dysmenorrhea    • Foot fracture    • Fracture     left shoulder, right knee   • Hand pain    • Hyperlipidemia    • Itching    • Low back pain    • Lump of skin    • Seborrheic keratoses      Family History   Problem Relation Age of Onset   • Diabetes Mother    • Hyperlipidemia Mother    • Hypertension Mother    • Thyroid disease Mother    • Hyperlipidemia Father    • Cancer Father         PROSTATE     History reviewed. No pertinent surgical history.  Social History     Substance and Sexual Activity   Alcohol Use No     Social History     Tobacco Use   Smoking Status Never Smoker   Smokeless Tobacco Never Used     Social History     Substance and Sexual Activity   Drug Use No     Review of Systems   Constitutional: Negative for fever.   Respiratory: Negative for shortness of breath.    Cardiovascular: Negative for chest pain.   Gastrointestinal: Negative for abdominal pain.   Psychiatric/Behavioral: The patient is nervous/anxious.         Depression Assessment Review:  PHQ-9 Total Score:    Vital Signs & Measurements Taken This Encounter  /82 (BP Location: Left arm, Patient Position: Sitting, Cuff Size: Adult)   Pulse 70   Temp 98 °F (36.7 °C) (Temporal)   Ht 157.5 cm (62.01\")   Wt 63.9 kg (140 lb 12.8 oz)   SpO2 97%   BMI 25.75 kg/m²    SpO2 Percentage    06/13/22 1402   SpO2: 97%     Physical Exam  Vitals reviewed.   Constitutional:       General: She is not in acute distress.  HENT:      Head: Normocephalic and atraumatic.   Eyes:      General: No scleral icterus.     Extraocular Movements: Extraocular movements intact.      Conjunctiva/sclera: Conjunctivae normal.      Pupils: Pupils are equal, round, and reactive to light.   Cardiovascular:      Rate and Rhythm: Normal rate and regular rhythm.   Pulmonary:      Effort: Pulmonary effort is " normal. No respiratory distress.      Breath sounds: Normal breath sounds.   Musculoskeletal:         General: No swelling.      Cervical back: Neck supple. No tenderness.   Lymphadenopathy:      Cervical: No cervical adenopathy.   Skin:     General: Skin is warm and dry.      Coloration: Skin is not jaundiced.   Neurological:      Mental Status: She is alert.   Psychiatric:         Mood and Affect: Mood normal.         Behavior: Behavior normal.              Assessment & Plan  Patient Active Problem List   Diagnosis   • Atopic rhinitis   • DUB (dysfunctional uterine bleeding)   • Difficult menstruation   • Generalized anxiety disorder   • Hyperlipidemia   • Menopause present   • SVT (supraventricular tachycardia) (HCC)   • Arthritis   • Paroxysmal SVT (supraventricular tachycardia) (HCC)   • Essential hypertension   • Mixed hyperlipidemia   • Closed 3-part fracture of proximal end of left humerus   • Closed nondisplaced comminuted fracture of right patella       ICD-10-CM ICD-9-CM   1. Generalized anxiety disorder  F41.1 300.02   2. Medication monitoring encounter  Z51.81 V58.83   3. Paroxysmal SVT (supraventricular tachycardia) (HCC)  I47.1 427.0   4. Essential hypertension  I10 401.9     Orders Placed This Encounter   Procedures   • Urine Drug Screen - Urine, Clean Catch     Order Specific Question:   Release to patient     Answer:   Immediate       Meds Ordered During Visit:  New Medications Ordered This Visit   Medications   • venlafaxine XR (Effexor XR) 150 MG 24 hr capsule     Sig: Take 1 capsule by mouth Daily.     Dispense:  90 capsule     Refill:  3   • LORazepam (ATIVAN) 0.5 MG tablet     Sig: Take 1 tablet by mouth 2 (Two) Times a Day As Needed for Anxiety.     Dispense:  60 tablet     Refill:  2     We will increase Effexor to 150 mg daily.  I updated Ativan prescription to use twice daily as needed as well.  PDMP, controlled substance contract, and UDS were updated today.  I recommended patient  continue her other medicines as previously prescribed.  We will plan to update her labs at her next appointment.    Return in about 2 months (around 8/13/2022), or if symptoms worsen or fail to improve, for Recheck.          Referring Provider (if known): No ref. provider found      This document has been electronically signed by Swathi Davis DO  June 13, 2022 15:34 EDT    Swathi Davis DO, FACOI  [address]  [place], [place] [address] [phone] (office)    Part of this note may be an electronic transcription/translation of spoken language to printed text using the Dragon Dictation System.

## 2022-08-15 ENCOUNTER — OFFICE VISIT (OUTPATIENT)
Dept: FAMILY MEDICINE CLINIC | Facility: CLINIC | Age: 57
End: 2022-08-15

## 2022-08-15 VITALS
HEIGHT: 62 IN | TEMPERATURE: 97.7 F | BODY MASS INDEX: 27.02 KG/M2 | OXYGEN SATURATION: 96 % | DIASTOLIC BLOOD PRESSURE: 86 MMHG | WEIGHT: 146.8 LBS | SYSTOLIC BLOOD PRESSURE: 122 MMHG | HEART RATE: 86 BPM

## 2022-08-15 DIAGNOSIS — I47.1 SVT (SUPRAVENTRICULAR TACHYCARDIA): ICD-10-CM

## 2022-08-15 DIAGNOSIS — F41.1 GENERALIZED ANXIETY DISORDER: Chronic | ICD-10-CM

## 2022-08-15 DIAGNOSIS — E78.2 MIXED HYPERLIPIDEMIA: ICD-10-CM

## 2022-08-15 DIAGNOSIS — B02.8 HERPES ZOSTER WITH OTHER COMPLICATION: Chronic | ICD-10-CM

## 2022-08-15 DIAGNOSIS — I10 ESSENTIAL HYPERTENSION: Primary | Chronic | ICD-10-CM

## 2022-08-15 DIAGNOSIS — Z12.31 ENCOUNTER FOR SCREENING MAMMOGRAM FOR MALIGNANT NEOPLASM OF BREAST: ICD-10-CM

## 2022-08-15 PROBLEM — B02.9 SHINGLES: Chronic | Status: ACTIVE | Noted: 2022-08-15

## 2022-08-15 PROCEDURE — 99214 OFFICE O/P EST MOD 30 MIN: CPT | Performed by: INTERNAL MEDICINE

## 2022-08-15 PROCEDURE — 80050 GENERAL HEALTH PANEL: CPT | Performed by: INTERNAL MEDICINE

## 2022-08-15 PROCEDURE — 80061 LIPID PANEL: CPT | Performed by: INTERNAL MEDICINE

## 2022-08-15 RX ORDER — ROSUVASTATIN CALCIUM 10 MG/1
10 TABLET, COATED ORAL DAILY
Qty: 90 TABLET | Refills: 1 | Status: SHIPPED | OUTPATIENT
Start: 2022-08-15 | End: 2023-01-04 | Stop reason: SDUPTHER

## 2022-08-15 RX ORDER — MULTIPLE VITAMINS W/ MINERALS TAB 9MG-400MCG
1 TAB ORAL DAILY
COMMUNITY

## 2022-08-15 RX ORDER — LORAZEPAM 0.5 MG/1
0.5 TABLET ORAL 2 TIMES DAILY PRN
Qty: 60 TABLET | Refills: 5 | Status: SHIPPED | OUTPATIENT
Start: 2022-08-15 | End: 2022-09-12 | Stop reason: SDUPTHER

## 2022-08-15 RX ORDER — METOPROLOL SUCCINATE 50 MG/1
50 TABLET, EXTENDED RELEASE ORAL
Qty: 90 TABLET | Refills: 1 | Status: SHIPPED | OUTPATIENT
Start: 2022-08-15 | End: 2023-01-04 | Stop reason: SDUPTHER

## 2022-08-15 RX ORDER — VALACYCLOVIR HYDROCHLORIDE 1 G/1
1000 TABLET, FILM COATED ORAL 3 TIMES DAILY
Qty: 30 TABLET | Refills: 1 | Status: SHIPPED | OUTPATIENT
Start: 2022-08-15 | End: 2023-02-16

## 2022-08-15 RX ORDER — LISINOPRIL 10 MG/1
10 TABLET ORAL DAILY
Qty: 90 TABLET | Refills: 1 | Status: SHIPPED | OUTPATIENT
Start: 2022-08-15 | End: 2023-01-04 | Stop reason: SDUPTHER

## 2022-08-15 NOTE — PROGRESS NOTES
Venipuncture Blood Specimen Collection  Venipuncture performed in LEFT ARM by Natasha Zavala RN with good hemostasis. Patient tolerated the procedure well without complications.   08/15/22   Natasha Zavala RN

## 2022-08-15 NOTE — PROGRESS NOTES
Patient Name: Nicole Cannon Today's Date: 8/15/2022   Patient MRN / CSN: [id number] / [id number] Date of Encounter: 8/15/2022   Patient Age / : 57 y.o. / 1965 Encounter Provider: Swathi Davis DO   Referring Physician: No ref. provider found          Nicole is a 57 y.o. female who is being seen today for Follow-up, Anxiety (Worsening anxiety), Immunizations, Hypertension, and Hyperlipidemia      Anxiety  Presents for follow-up visit. Symptoms include nervous/anxious behavior. Patient reports no chest pain, shortness of breath or suicidal ideas. The severity of symptoms is moderate.     Compliance with medications: Patient reports that anxiety has improved with increased effexor dose. She reports tolerating it well. She is also taking ativan as needed with good relief and tolerance.   Hypertension  This is a chronic problem. The current episode started more than 1 year ago. The problem is controlled. Associated symptoms include anxiety and headaches. Pertinent negatives include no chest pain or shortness of breath. Current antihypertension treatment includes ACE inhibitors and beta blockers. There are no compliance problems.    Hyperlipidemia  This is a chronic problem. The current episode started more than 1 year ago. The problem is controlled. Pertinent negatives include no chest pain or shortness of breath. Current antihyperlipidemic treatment includes statins. The current treatment provides significant improvement of lipids. There are no compliance problems.        Allergies include:Patient has no known allergies.  Current Outpatient Medications   Medication Sig Dispense Refill   • cetirizine (zyrTEC) 10 MG tablet Take 10 mg by mouth Daily.     • lansoprazole (PREVACID) 15 MG capsule Take 15 mg by mouth Daily.     • lisinopril (PRINIVIL,ZESTRIL) 10 MG tablet Take 1 tablet by mouth Daily. 90 tablet 1   • LORazepam (ATIVAN) 0.5 MG tablet Take 1 tablet by mouth 2 (Two) Times a Day As Needed for Anxiety. 60  tablet 5   • metoprolol succinate XL (TOPROL-XL) 50 MG 24 hr tablet Take 1 tablet by mouth Daily. 90 tablet 1   • multivitamin with minerals tablet tablet Take 1 tablet by mouth Daily.     • rosuvastatin (CRESTOR) 10 MG tablet Take 1 tablet by mouth Daily. 90 tablet 1   • venlafaxine XR (Effexor XR) 150 MG 24 hr capsule Take 1 capsule by mouth Daily. 90 capsule 3   • Calcium Carbonate (CALCIUM 500 PO) Take  by mouth.     • valACYclovir (Valtrex) 1000 MG tablet Take 1 tablet by mouth 3 (Three) Times a Day. 30 tablet 1   • VITAMIN D PO Take  by mouth.       No current facility-administered medications for this visit.     Past Medical History:   Diagnosis Date   • Arrhythmia    • CRPS (complex regional pain syndrome) type I of lower limb    • Dysfunctional uterine bleeding    • Dysmenorrhea    • Foot fracture    • Fracture     left shoulder, right knee   • Hand pain    • Hyperlipidemia    • Itching    • Low back pain    • Lump of skin    • [place] Hunt syndrome (geniculate herpes zoster)    • Seborrheic keratoses      Family History   Problem Relation Age of Onset   • Diabetes Mother    • Hyperlipidemia Mother    • Hypertension Mother    • Thyroid disease Mother    • Hyperlipidemia Father    • Cancer Father         PROSTATE     History reviewed. No pertinent surgical history.  Social History     Substance and Sexual Activity   Alcohol Use No     Social History     Tobacco Use   Smoking Status Never Smoker   Smokeless Tobacco Never Used     Social History     Substance and Sexual Activity   Drug Use No     Review of Systems   Constitutional: Negative for fever.   Respiratory: Negative for shortness of breath.    Cardiovascular: Negative for chest pain.   Gastrointestinal: Negative for abdominal pain.   Skin: Positive for rash.        On right rash from recurrent shingles. H/o [place] Syndrome. Patient reports recurrent bouts of shingles since. She would like to have valtrex as needed. She would also like to have  "shingrix.    Neurological: Positive for headaches.   Psychiatric/Behavioral: Negative for suicidal ideas. The patient is nervous/anxious.         Depression Assessment Review:  PHQ-9 Total Score:    Vital Signs & Measurements Taken This Encounter  /86 (BP Location: Left arm, Patient Position: Sitting, Cuff Size: Adult)   Pulse 86   Temp 97.7 °F (36.5 °C) (Temporal)   Ht 157.5 cm (62.01\")   Wt 66.6 kg (146 lb 12.8 oz)   SpO2 96%   BMI 26.84 kg/m²    SpO2 Percentage    08/15/22 1520   SpO2: 96%          Physical Exam  Vitals reviewed.   Constitutional:       General: She is not in acute distress.  HENT:      Head: Normocephalic and atraumatic.   Eyes:      General: No scleral icterus.     Extraocular Movements: Extraocular movements intact.      Conjunctiva/sclera: Conjunctivae normal.      Pupils: Pupils are equal, round, and reactive to light.   Cardiovascular:      Rate and Rhythm: Normal rate and regular rhythm.   Pulmonary:      Effort: Pulmonary effort is normal. No respiratory distress.      Breath sounds: Normal breath sounds. No wheezing or rhonchi.   Musculoskeletal:         General: No swelling.   Skin:     General: Skin is warm and dry.      Coloration: Skin is not jaundiced.   Neurological:      Mental Status: She is alert.   Psychiatric:         Mood and Affect: Mood normal.         Behavior: Behavior normal.              Assessment & Plan  Patient Active Problem List   Diagnosis   • Atopic rhinitis   • DUB (dysfunctional uterine bleeding)   • Difficult menstruation   • Generalized anxiety disorder   • Hyperlipidemia   • Menopause present   • SVT (supraventricular tachycardia) (HCC)   • Arthritis   • Paroxysmal SVT (supraventricular tachycardia) (HCC)   • Essential hypertension   • Mixed hyperlipidemia   • Closed 3-part fracture of proximal end of left humerus   • Closed nondisplaced comminuted fracture of right patella   • Shingles       ICD-10-CM ICD-9-CM   1. Essential hypertension  I10 " 401.9   2. Generalized anxiety disorder  F41.1 300.02   3. SVT (supraventricular tachycardia) (HCC)  I47.1 427.89   4. Mixed hyperlipidemia  E78.2 272.2   5. Herpes zoster with other complication  B02.8 053.79   6. Encounter for screening mammogram for malignant neoplasm of breast  Z12.31 V76.12     Orders Placed This Encounter   Procedures   • Mammo Screening Digital Tomosynthesis Bilateral With CAD     Standing Status:   Future     Standing Expiration Date:   8/15/2023     Scheduling Instructions:      Schedule at [facility] in [place].     Order Specific Question:   Reason for Exam:     Answer:   Screening for breast CA   • Comprehensive Metabolic Panel     Order Specific Question:   Release to patient     Answer:   Routine Release   • CBC (No Diff)     Order Specific Question:   Release to patient     Answer:   Routine Release   • Lipid Panel   • TSH     Order Specific Question:   Release to patient     Answer:   Routine Release       Meds Ordered During Visit:  New Medications Ordered This Visit   Medications   • LORazepam (ATIVAN) 0.5 MG tablet     Sig: Take 1 tablet by mouth 2 (Two) Times a Day As Needed for Anxiety.     Dispense:  60 tablet     Refill:  5   • metoprolol succinate XL (TOPROL-XL) 50 MG 24 hr tablet     Sig: Take 1 tablet by mouth Daily.     Dispense:  90 tablet     Refill:  1   • lisinopril (PRINIVIL,ZESTRIL) 10 MG tablet     Sig: Take 1 tablet by mouth Daily.     Dispense:  90 tablet     Refill:  1   • rosuvastatin (CRESTOR) 10 MG tablet     Sig: Take 1 tablet by mouth Daily.     Dispense:  90 tablet     Refill:  1   • valACYclovir (Valtrex) 1000 MG tablet     Sig: Take 1 tablet by mouth 3 (Three) Times a Day.     Dispense:  30 tablet     Refill:  1       PDMP reviewed today. We will update medication refills as above. I encouraged patient to have Shingrix vaccine at pharmacy. We will also update mammogram and labs as above.     Return in about 6 months (around 2/15/2023), or if symptoms  worsen or fail to improve, for Recheck.          Referring Provider (if known): No ref. provider found      This document has been electronically signed by Swathi Davis DO  August 15, 2022 17:09 EDT    Swathi Davis DO, FACOI  [address] [place] [address]  [phone] (office)    Part of this note may be an electronic transcription/translation of spoken language to printed text using the Dragon Dictation System.

## 2022-08-16 LAB
ALBUMIN SERPL-MCNC: 5 G/DL (ref 3.5–5.2)
ALBUMIN/GLOB SERPL: 2.1 G/DL
ALP SERPL-CCNC: 94 U/L (ref 39–117)
ALT SERPL W P-5'-P-CCNC: 16 U/L (ref 1–33)
ANION GAP SERPL CALCULATED.3IONS-SCNC: 12.5 MMOL/L (ref 5–15)
AST SERPL-CCNC: 17 U/L (ref 1–32)
BILIRUB SERPL-MCNC: 0.2 MG/DL (ref 0–1.2)
BUN SERPL-MCNC: 12 MG/DL (ref 6–20)
BUN/CREAT SERPL: 14.1 (ref 7–25)
CALCIUM SPEC-SCNC: 9.7 MG/DL (ref 8.6–10.5)
CHLORIDE SERPL-SCNC: 99 MMOL/L (ref 98–107)
CHOLEST SERPL-MCNC: 207 MG/DL (ref 0–200)
CO2 SERPL-SCNC: 26.5 MMOL/L (ref 22–29)
CREAT SERPL-MCNC: 0.85 MG/DL (ref 0.57–1)
DEPRECATED RDW RBC AUTO: 39.8 FL (ref 37–54)
EGFRCR SERPLBLD CKD-EPI 2021: 80 ML/MIN/1.73
ERYTHROCYTE [DISTWIDTH] IN BLOOD BY AUTOMATED COUNT: 12.2 % (ref 12.3–15.4)
GLOBULIN UR ELPH-MCNC: 2.4 GM/DL
GLUCOSE SERPL-MCNC: 104 MG/DL (ref 65–99)
HCT VFR BLD AUTO: 41.2 % (ref 34–46.6)
HDLC SERPL-MCNC: 53 MG/DL (ref 40–60)
HGB BLD-MCNC: 14 G/DL (ref 12–15.9)
LDLC SERPL CALC-MCNC: 112 MG/DL (ref 0–100)
LDLC/HDLC SERPL: 1.98 {RATIO}
MCH RBC QN AUTO: 30.9 PG (ref 26.6–33)
MCHC RBC AUTO-ENTMCNC: 34 G/DL (ref 31.5–35.7)
MCV RBC AUTO: 90.9 FL (ref 79–97)
PLATELET # BLD AUTO: 306 10*3/MM3 (ref 140–450)
PMV BLD AUTO: 10 FL (ref 6–12)
POTASSIUM SERPL-SCNC: 4.3 MMOL/L (ref 3.5–5.2)
PROT SERPL-MCNC: 7.4 G/DL (ref 6–8.5)
RBC # BLD AUTO: 4.53 10*6/MM3 (ref 3.77–5.28)
SODIUM SERPL-SCNC: 138 MMOL/L (ref 136–145)
TRIGL SERPL-MCNC: 244 MG/DL (ref 0–150)
TSH SERPL DL<=0.05 MIU/L-ACNC: 2.15 UIU/ML (ref 0.27–4.2)
VLDLC SERPL-MCNC: 42 MG/DL (ref 5–40)
WBC NRBC COR # BLD: 6.43 10*3/MM3 (ref 3.4–10.8)

## 2022-09-12 DIAGNOSIS — F41.1 GENERALIZED ANXIETY DISORDER: Chronic | ICD-10-CM

## 2022-09-12 RX ORDER — LORAZEPAM 0.5 MG/1
0.5 TABLET ORAL 2 TIMES DAILY PRN
Qty: 60 TABLET | Refills: 5 | Status: SHIPPED | OUTPATIENT
Start: 2022-09-12 | End: 2023-03-06 | Stop reason: SDUPTHER

## 2022-09-12 NOTE — TELEPHONE ENCOUNTER
Caller: Nicole Cannon    Relationship: Self    Best call back number: [phone]  Requested Prescriptions:   Requested Prescriptions     Pending Prescriptions Disp Refills   • LORazepam (ATIVAN) 0.5 MG tablet 60 tablet 5     Sig: Take 1 tablet by mouth 2 (Two) Times a Day As Needed for Anxiety.        Pharmacy where request should be sent: [facility] #[id number] - [place], [place] - [address] [facility] [address] - [phone] [facility] [phone]      Additional details provided by patient: OUT OF MEDICATION     Does the patient have less than a 3 day supply:  [x] Yes  [] No    June Gerardo Rep   09/12/22 10:27 EDT

## 2022-11-29 RX ORDER — VENLAFAXINE HYDROCHLORIDE 150 MG/1
150 CAPSULE, EXTENDED RELEASE ORAL DAILY
Qty: 90 CAPSULE | Refills: 3 | Status: SHIPPED | OUTPATIENT
Start: 2022-11-29 | End: 2023-02-16

## 2022-12-07 DIAGNOSIS — Z12.31 ENCOUNTER FOR SCREENING MAMMOGRAM FOR MALIGNANT NEOPLASM OF BREAST: ICD-10-CM

## 2023-01-04 DIAGNOSIS — E78.2 MIXED HYPERLIPIDEMIA: ICD-10-CM

## 2023-01-04 DIAGNOSIS — I47.1 SVT (SUPRAVENTRICULAR TACHYCARDIA): ICD-10-CM

## 2023-01-04 RX ORDER — METOPROLOL SUCCINATE 50 MG/1
50 TABLET, EXTENDED RELEASE ORAL
Qty: 90 TABLET | Refills: 1 | Status: SHIPPED | OUTPATIENT
Start: 2023-01-04

## 2023-01-04 RX ORDER — ROSUVASTATIN CALCIUM 10 MG/1
10 TABLET, COATED ORAL DAILY
Qty: 90 TABLET | Refills: 1 | Status: SHIPPED | OUTPATIENT
Start: 2023-01-04

## 2023-01-04 RX ORDER — LISINOPRIL 10 MG/1
10 TABLET ORAL DAILY
Qty: 90 TABLET | Refills: 1 | Status: SHIPPED | OUTPATIENT
Start: 2023-01-04

## 2023-01-04 NOTE — TELEPHONE ENCOUNTER
Caller: Davis Nicole YANIRA    Relationship: Self    Best call back number:  [phone]     Requested Prescriptions:   Requested Prescriptions     Pending Prescriptions Disp Refills   • lisinopril (PRINIVIL,ZESTRIL) 10 MG tablet 90 tablet 1     Sig: Take 1 tablet by mouth Daily.   • rosuvastatin (CRESTOR) 10 MG tablet 90 tablet 1     Sig: Take 1 tablet by mouth Daily.   • metoprolol succinate XL (TOPROL-XL) 50 MG 24 hr tablet 90 tablet 1     Sig: Take 1 tablet by mouth Daily.        Pharmacy where request should be sent: [id number] - [place], [place] - [address] [facility] [phone] [facility] [phone] FX     Additional details provided by patient: PATIENT IS OUT OF THE MEDICATIONS   DIDN'T REALIZE  THERE WAS NO REFILLS ON THEM     Does the patient have less than a 3 day supply:  [x] Yes  [] No    Would you like a call back once the refill request has been completed: [x] Yes [] No    If the office needs to give you a call back, can they leave a voicemail: [x] Yes [] No

## 2023-02-16 ENCOUNTER — TELEMEDICINE (OUTPATIENT)
Dept: FAMILY MEDICINE CLINIC | Facility: CLINIC | Age: 58
End: 2023-02-16
Payer: COMMERCIAL

## 2023-02-16 DIAGNOSIS — B02.8 HERPES ZOSTER WITH OTHER COMPLICATION: Chronic | ICD-10-CM

## 2023-02-16 DIAGNOSIS — J01.10 ACUTE FRONTAL SINUSITIS, RECURRENCE NOT SPECIFIED: Primary | ICD-10-CM

## 2023-02-16 PROCEDURE — 99214 OFFICE O/P EST MOD 30 MIN: CPT | Performed by: INTERNAL MEDICINE

## 2023-02-16 RX ORDER — VALACYCLOVIR HYDROCHLORIDE 1 G/1
1000 TABLET, FILM COATED ORAL 3 TIMES DAILY
Qty: 30 TABLET | Refills: 1 | Status: SHIPPED | OUTPATIENT
Start: 2023-02-16 | End: 2023-03-15

## 2023-02-16 RX ORDER — METHYLPREDNISOLONE 4 MG/1
TABLET ORAL
Qty: 21 EACH | Refills: 0 | Status: SHIPPED | OUTPATIENT
Start: 2023-02-16 | End: 2023-03-15

## 2023-02-16 RX ORDER — AMOXICILLIN AND CLAVULANATE POTASSIUM 875; 125 MG/1; MG/1
1 TABLET, FILM COATED ORAL 2 TIMES DAILY
Qty: 20 TABLET | Refills: 0 | Status: SHIPPED | OUTPATIENT
Start: 2023-02-16 | End: 2023-02-26

## 2023-02-16 NOTE — PROGRESS NOTES
Patient Name: Nicole Cannon Today's Date: 2023   Patient MRN / CSN: [id number] / [id number] Date of Encounter: 2023   Patient Age / : 57 y.o. / 1965 Encounter Provider: Swathi Davis DO   Referring Physician: No ref. provider found          Nicole is a 57 y.o. female who is being seen today for URI    As a means of preventing the spread of the COVID 19, this visit was done virtually via video platform through [employer] as consented by the patient. Patient was at home and provider at [facility] office during this visit. The patient consented to this telehealth visit and signed the video visit consent form. This visit included audio and video interaction. There were no technical issues that occurred during this visit.     History of Present Illness     Nicole presents today for an acute visit with complaints of upper respiratory infection.  She reports this is been going on for 2 or 3 weeks at this point.  She took a Z-Lane with some relief but has finished it and is still having frontal sinus pressure, with frequent cough.  She denies fever or wheezing.  She reports thick PND, sometimes colored.  She reports the cough is very frequent and at times causes her to have incontinence.    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  She reports feeling like she is going to have a flare of this shingles above her right eyelid.  She typically takes Valtrex as needed and would like a prescription of this sent in.    Allergies include:Patient has no known allergies.  Current Outpatient Medications   Medication Sig Dispense Refill   • cetirizine (zyrTEC) 10 MG tablet Take 10 mg by mouth Daily.     • lansoprazole (PREVACID) 15 MG capsule Take 15 mg by mouth Daily.     • lisinopril (PRINIVIL,ZESTRIL) 10 MG tablet Take 1 tablet by mouth Daily. 90 tablet 1   • LORazepam (ATIVAN) 0.5 MG tablet Take 1 tablet by mouth 2 (Two) Times a Day As Needed for Anxiety. 60 tablet 5   • metoprolol  succinate XL (TOPROL-XL) 50 MG 24 hr tablet Take 1 tablet by mouth Daily. 90 tablet 1   • rosuvastatin (CRESTOR) 10 MG tablet Take 1 tablet by mouth Daily. 90 tablet 1   • valACYclovir (Valtrex) 1000 MG tablet Take 1 tablet by mouth 3 (Three) Times a Day. 30 tablet 1   • VITAMIN D PO Take  by mouth.     • amoxicillin-clavulanate (Augmentin) 875-125 MG per tablet Take 1 tablet by mouth 2 (Two) Times a Day for 10 days. 20 tablet 0   • Calcium Carbonate (CALCIUM 500 PO) Take  by mouth.     • methylPREDNISolone (MEDROL) 4 MG dose pack Take as directed on package instructions. 21 each 0   • multivitamin with minerals tablet tablet Take 1 tablet by mouth Daily.       No current facility-administered medications for this visit.     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 No pertinent surgical history.  Social History     Substance and Sexual Activity   Alcohol Use No     Social History     Tobacco Use   Smoking Status Never   Smokeless Tobacco Never     Social History     Substance and Sexual Activity   Drug Use No     Review of Systems   Constitutional: Negative for fever.   HENT: Positive for congestion, sinus pressure, sinus pain and voice change.    Respiratory: Positive for cough. Negative for wheezing.    Cardiovascular: Negative for chest pain.   Genitourinary: Negative for dysuria and hematuria.        Stress incontinence      Neurological: Positive for headaches.        Depression Assessment Review:  PHQ-9 Total Score:    Vital Signs & Measurements Taken This Encounter  There were no vitals taken for this visit.   There were no vitals filed for this visit.     Physical Exam  Constitutional:       General: She is not in acute distress.     Appearance: She is not ill-appearing.   Pulmonary:      Effort: No respiratory distress.   Neurological:      Mental Status: She is alert.   Psychiatric:         Mood and Affect: Mood normal.         Behavior: Behavior normal.         Thought Content: Thought content normal.         Judgment: Judgment normal.              Assessment & Plan  Patient Active Problem List   Diagnosis   • Atopic rhinitis   • DUB (dysfunctional uterine bleeding)   • Difficult menstruation   • Generalized anxiety disorder   • Hyperlipidemia   • Menopause present   • SVT (supraventricular tachycardia) (HCC)   • Arthritis   • Paroxysmal SVT (supraventricular tachycardia) (HCC)   • Essential hypertension   • Mixed hyperlipidemia   • Closed 3-part fracture of proximal end of left humerus   • Closed nondisplaced comminuted fracture of right patella   • Shingles       ICD-10-CM ICD-9-CM   1. Acute frontal sinusitis, recurrence not specified  J01.10 461.1   2. Herpes zoster with other complication  B02.8 053.79     No orders of the defined types were placed in this encounter.      Meds Ordered During Visit:  New Medications Ordered This Visit   Medications   • amoxicillin-clavulanate (Augmentin) 875-125 MG per tablet     Sig: Take 1 tablet by mouth 2 (Two) Times a Day for 10 days.     Dispense:  20 tablet     Refill:  0   • methylPREDNISolone (MEDROL) 4 MG dose pack     Sig: Take as directed on package instructions.     Dispense:  21 each     Refill:  0   • valACYclovir (Valtrex) 1000 MG tablet     Sig: Take 1 tablet by mouth 3 (Three) Times a Day.     Dispense:  30 tablet     Refill:  1     I discussed Augmentin, Medrol Dosepak,  and Valtrex with patient today.  I encouraged her to take these medicines with food.  We will plan on following up for an in office visit in about 1 month, or certainly sooner if needed.    Return in about 1 month (around 3/16/2023), or if symptoms worsen or fail to improve, for Recheck.          Referring Provider (if known): No ref. provider found      This document has been electronically signed by Swathi Davis DO  February 16, 2023 14:08 EST    Swathi Davis DO, FACOI  [address] [place] [address]  [phone] (office)    Part of this note may be an electronic transcription/translation of spoken language to printed text using the Dragon Dictation System.

## 2023-03-06 DIAGNOSIS — F41.1 GENERALIZED ANXIETY DISORDER: Chronic | ICD-10-CM

## 2023-03-06 RX ORDER — LORAZEPAM 0.5 MG/1
0.5 TABLET ORAL 2 TIMES DAILY PRN
Qty: 60 TABLET | Refills: 0 | Status: SHIPPED | OUTPATIENT
Start: 2023-03-06 | End: 2023-03-15 | Stop reason: SDUPTHER

## 2023-03-06 NOTE — TELEPHONE ENCOUNTER
Caller: Nicole Cannon    Relationship: Self    Best call back number: [phone]  Requested Prescriptions:   Requested Prescriptions     Pending Prescriptions Disp Refills   • LORazepam (ATIVAN) 0.5 MG tablet 60 tablet 5     Sig: Take 1 tablet by mouth 2 (Two) Times a Day As Needed for Anxiety.        Pharmacy where request should be sent: [id number] - [place], [place] - [address] AT [facility] [phone] [facility] [phone] FX     Additional details provided by patient: OUT OF MEDICATION     Does the patient have less than a 3 day supply:  [x] Yes  [] No    Would you like a call back once the refill request has been completed: [] Yes [x] No    If the office needs to give you a call back, can they leave a voicemail: [] Yes [x] No    June Hewitt Rep   03/06/23 10:06 EST

## 2023-03-15 ENCOUNTER — OFFICE VISIT (OUTPATIENT)
Dept: FAMILY MEDICINE CLINIC | Facility: CLINIC | Age: 58
End: 2023-03-15
Payer: COMMERCIAL

## 2023-03-15 VITALS
WEIGHT: 150 LBS | DIASTOLIC BLOOD PRESSURE: 74 MMHG | SYSTOLIC BLOOD PRESSURE: 112 MMHG | OXYGEN SATURATION: 97 % | HEART RATE: 80 BPM | BODY MASS INDEX: 27.43 KG/M2 | TEMPERATURE: 97.7 F

## 2023-03-15 DIAGNOSIS — Z00.00 HEALTH CARE MAINTENANCE: ICD-10-CM

## 2023-03-15 DIAGNOSIS — I10 ESSENTIAL HYPERTENSION: Primary | Chronic | ICD-10-CM

## 2023-03-15 DIAGNOSIS — N39.3 STRESS INCONTINENCE OF URINE: ICD-10-CM

## 2023-03-15 DIAGNOSIS — F41.1 GENERALIZED ANXIETY DISORDER: Chronic | ICD-10-CM

## 2023-03-15 DIAGNOSIS — E78.2 MIXED HYPERLIPIDEMIA: Chronic | ICD-10-CM

## 2023-03-15 DIAGNOSIS — R73.9 HYPERGLYCEMIA: ICD-10-CM

## 2023-03-15 DIAGNOSIS — B02.8 HERPES ZOSTER WITH OTHER COMPLICATION: Chronic | ICD-10-CM

## 2023-03-15 DIAGNOSIS — R53.82 CHRONIC FATIGUE: ICD-10-CM

## 2023-03-15 PROBLEM — I47.10 SVT (SUPRAVENTRICULAR TACHYCARDIA): Status: RESOLVED | Noted: 2020-12-31 | Resolved: 2023-03-15

## 2023-03-15 PROBLEM — S82.044A CLOSED NONDISPLACED COMMINUTED FRACTURE OF RIGHT PATELLA: Status: RESOLVED | Noted: 2021-08-19 | Resolved: 2023-03-15

## 2023-03-15 PROBLEM — S42.292A CLOSED 3-PART FRACTURE OF PROXIMAL END OF LEFT HUMERUS: Status: RESOLVED | Noted: 2021-08-19 | Resolved: 2023-03-15

## 2023-03-15 PROBLEM — I47.1 SVT (SUPRAVENTRICULAR TACHYCARDIA): Status: RESOLVED | Noted: 2020-12-31 | Resolved: 2023-03-15

## 2023-03-15 PROCEDURE — 83036 HEMOGLOBIN GLYCOSYLATED A1C: CPT | Performed by: INTERNAL MEDICINE

## 2023-03-15 PROCEDURE — 99214 OFFICE O/P EST MOD 30 MIN: CPT | Performed by: INTERNAL MEDICINE

## 2023-03-15 PROCEDURE — 80050 GENERAL HEALTH PANEL: CPT | Performed by: INTERNAL MEDICINE

## 2023-03-15 PROCEDURE — 84439 ASSAY OF FREE THYROXINE: CPT | Performed by: INTERNAL MEDICINE

## 2023-03-15 PROCEDURE — 86803 HEPATITIS C AB TEST: CPT | Performed by: INTERNAL MEDICINE

## 2023-03-15 PROCEDURE — 80061 LIPID PANEL: CPT | Performed by: INTERNAL MEDICINE

## 2023-03-15 PROCEDURE — 82607 VITAMIN B-12: CPT | Performed by: INTERNAL MEDICINE

## 2023-03-15 RX ORDER — LORAZEPAM 0.5 MG/1
0.5 TABLET ORAL 2 TIMES DAILY PRN
Qty: 60 TABLET | Refills: 2 | Status: SHIPPED | OUTPATIENT
Start: 2023-03-15

## 2023-03-15 RX ORDER — PSEUDOEPHEDRINE HCL 30 MG
30 TABLET ORAL EVERY 4 HOURS PRN
COMMUNITY

## 2023-03-15 RX ORDER — LORAZEPAM 0.5 MG/1
0.5 TABLET ORAL 2 TIMES DAILY PRN
Qty: 60 TABLET | Refills: 0 | Status: CANCELLED | OUTPATIENT
Start: 2023-03-15

## 2023-03-15 RX ORDER — VALACYCLOVIR HYDROCHLORIDE 1 G/1
1000 TABLET, FILM COATED ORAL 3 TIMES DAILY PRN
Qty: 90 TABLET | Refills: 1 | Status: SHIPPED | OUTPATIENT
Start: 2023-03-15

## 2023-03-15 NOTE — PROGRESS NOTES
Patient Name: Nicole Cannon Today's Date: 3/15/2023   Patient MRN / CSN: [id number] / [id number] Date of Encounter: 3/15/2023   Patient Age / : 57 y.o. / 1965 Encounter Provider: Swathi Davis DO   Referring Physician: No ref. provider found          Nicole is a 57 y.o. female who is being seen today for Follow-up      History of Present Illness     Nicole presents today to follow up on Anxiety. She reports stopping venlafaxine since last visit due to feeling like it made her emotionless. She noted increased forgetfulness as well. She reports anxiety has been worse off the venlafaxine. She is taking ativan as needed and reports doing well with this. She tries not to take it twice a day but sometimes she needs to. She denies suicidal thoughts but reports some depression.  She prefers to hold on antidepressants at this time.     Nicole has hypertension and hyperlipidemia.  Her blood pressure is very well controlled with the current regimen.  She is tolerating lisinopril and Crestor well.  She is due for labs and agrees to update these today.  She has noted episodes where she feels like her glucose level drops too low.  She has had a history of hyperglycemia of 147 in the past as well.  She has a family history of prediabetes and is interested in having an A1c done with her labs today.    Allergies include:Patient has no known allergies.  Current Outpatient Medications   Medication Sig Dispense Refill   • Calcium Carbonate (CALCIUM 500 PO) Take  by mouth.     • cetirizine (zyrTEC) 10 MG tablet Take 1 tablet by mouth Daily.     • lansoprazole (PREVACID) 15 MG capsule Take 1 capsule by mouth Daily.     • lisinopril (PRINIVIL,ZESTRIL) 10 MG tablet Take 1 tablet by mouth Daily. 90 tablet 1   • LORazepam (ATIVAN) 0.5 MG tablet Take 1 tablet by mouth 2 (Two) Times a Day As Needed for Anxiety. 60 tablet 2   • metoprolol succinate XL (TOPROL-XL) 50 MG 24 hr tablet Take 1 tablet by mouth Daily. 90 tablet 1   •  multivitamin with minerals tablet tablet Take 1 tablet by mouth Daily.     • pseudoephedrine (SUDAFED) 30 MG tablet Take 1 tablet by mouth Every 4 (Four) Hours As Needed for Congestion.     • rosuvastatin (CRESTOR) 10 MG tablet Take 1 tablet by mouth Daily. 90 tablet 1   • valACYclovir (Valtrex) 1000 MG tablet Take 1 tablet by mouth 3 (Three) Times a Day As Needed (shingles outbreak). prn 90 tablet 1   • VITAMIN D PO Take  by mouth.       No current facility-administered medications for this visit.     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 No pertinent surgical history.  Social History     Substance and Sexual Activity   Alcohol Use No     Social History     Tobacco Use   Smoking Status Never   Smokeless Tobacco Never     Social History     Substance and Sexual Activity   Drug Use No     Review of Systems   Constitutional: Negative for fever.   Respiratory: Negative for shortness of breath.    Cardiovascular: Negative for chest pain.   Gastrointestinal: Negative for abdominal pain and blood in stool.   Genitourinary: Negative for hematuria and vaginal bleeding.        Patient reports stress incontinence at times.    Musculoskeletal: Positive for arthralgias.        Left shoulder and right knee pain at times. Sometimes left knee pain.    Skin:        Intermittent shingles flare ups on right face. She would like to keep valtrex on  hand to use as needed.    Psychiatric/Behavioral: The patient is nervous/anxious.         Depression Assessment Review:  PHQ-9 Total Score: 10  Vital Signs & Measurements Taken This Encounter  /74 (BP Location: Right arm, Patient Position: Sitting, Cuff Size: Adult)   Pulse 80   Temp 97.7 °F (36.5 °C) (Temporal)   Wt 68 kg (150 lb)   SpO2 97%   BMI 27.43 kg/m²    SpO2 Percentage    03/15/23 1333   SpO2: 97%        BMI is >= 25 and <30. (Overweight) The following options were offered after discussion;: nutrition counseling/recommendations      Physical Exam  Vitals reviewed.   Constitutional:       General: She is not in acute distress.  HENT:      Head: Normocephalic and atraumatic.   Eyes:      General: No scleral icterus.     Extraocular Movements: Extraocular movements intact.      Conjunctiva/sclera: Conjunctivae normal.      Pupils: Pupils are equal, round, and reactive to light.   Cardiovascular:      Rate and Rhythm: Normal rate and regular rhythm.   Pulmonary:      Effort: Pulmonary effort is normal. No respiratory distress.      Breath sounds: Normal breath sounds. No wheezing or rhonchi.   Musculoskeletal:         General: No swelling.      Cervical back: Neck supple. No tenderness.   Lymphadenopathy:      Cervical: No cervical adenopathy.   Skin:     General: Skin is warm and dry.      Coloration: Skin is not jaundiced.   Neurological:      Mental Status: She is alert.   Psychiatric:         Mood and Affect: Mood normal.         Behavior: Behavior normal.           Assessment & Plan  Patient Active Problem List   Diagnosis   • Atopic rhinitis   • Generalized anxiety disorder   • Menopause present   • Arthritis   • Paroxysmal SVT (supraventricular tachycardia) (HCC)   • Essential hypertension   • Mixed hyperlipidemia   • Shingles   • Stress incontinence of urine   • Chronic fatigue   • Health care maintenance   • Hyperglycemia       ICD-10-CM ICD-9-CM   1. Essential hypertension  I10 401.9   2.  Generalized anxiety disorder  F41.1 300.02   3. Herpes zoster with other complication  B02.8 053.79   4. Mixed hyperlipidemia  E78.2 272.2   5. Stress incontinence of urine  N39.3 [id number]   6. Chronic fatigue  R53.82 780.79   7. Health care maintenance  Z00.00 V70.0   8. Hyperglycemia  R73.9 790.29     Orders Placed This Encounter   Procedures   • Hepatitis C Antibody   • Comprehensive Metabolic Panel     Order Specific Question:   Release to patient     Answer:   Routine Release   • CBC (No Diff)     Order Specific Question:   Release to patient     Answer:   Routine Release   • Lipid Panel   • TSH     Order Specific Question:   Release to patient     Answer:   Routine Release   • Vitamin B12     Order Specific Question:   Release to patient     Answer:   Routine Release   • T4, Free   • Hemoglobin A1c     Order Specific Question:   Release to patient     Answer:   Routine Release   • Ambulatory Referral to Occupational Therapy     Referral Priority:   Routine     Referral Type:   Occupational Therapy     Referral Reason:   Specialty Services Required     Requested Specialty:   Occupational Therapy     Number of Visits Requested:   1       Meds Ordered During Visit:  New Medications Ordered This Visit   Medications   • valACYclovir (Valtrex) 1000 MG tablet     Sig: Take 1 tablet by mouth 3 (Three) Times a Day As Needed (shingles outbreak). prn     Dispense:  90 tablet     Refill:  1   • LORazepam (ATIVAN) 0.5 MG tablet     Sig: Take 1 tablet by mouth 2 (Two) Times a Day As Needed for Anxiety.     Dispense:  60 tablet     Refill:  2     I reviewed PDMP today.  Updated refills today as requested.  We will update labs today as above.  I recommended updating the shingles vaccine soon and patient is planning to do so.  I also discussed occupational therapy for urinary incontinence.  Patient is interested in trying this and we will place this referral today.      Return in about 3 months (around 6/15/2023), or if  symptoms worsen or fail to improve, for Annual.          Referring Provider (if known): No ref. provider found      This document has been electronically signed by Swathi Davis DO  March 15, 2023 14:16 EDT    Swathi Davis DO, FACOI  [address] [place] [address]  [phone] (office)    Part of this note may be an electronic transcription/translation of spoken language to printed text using the Dragon Dictation System.

## 2023-03-15 NOTE — PROGRESS NOTES
Venipuncture Blood Specimen Collection  Venipuncture performed in RIGHT ARM by Natasha Zavala RN with good hemostasis. Patient tolerated the procedure well without complications.   03/15/23   Natasha Zavala RN

## 2023-03-16 PROBLEM — R73.03 PREDIABETES: Status: ACTIVE | Noted: 2023-03-15

## 2023-03-16 PROBLEM — R73.03 PREDIABETES: Chronic | Status: ACTIVE | Noted: 2023-03-15

## 2023-03-16 LAB
ALBUMIN SERPL-MCNC: 5.1 G/DL (ref 3.5–5.2)
ALBUMIN/GLOB SERPL: 2 G/DL
ALP SERPL-CCNC: 95 U/L (ref 39–117)
ALT SERPL W P-5'-P-CCNC: 18 U/L (ref 1–33)
ANION GAP SERPL CALCULATED.3IONS-SCNC: 12 MMOL/L (ref 5–15)
AST SERPL-CCNC: 19 U/L (ref 1–32)
BILIRUB SERPL-MCNC: <0.2 MG/DL (ref 0–1.2)
BUN SERPL-MCNC: 16 MG/DL (ref 6–20)
BUN/CREAT SERPL: 20.3 (ref 7–25)
CALCIUM SPEC-SCNC: 10 MG/DL (ref 8.6–10.5)
CHLORIDE SERPL-SCNC: 104 MMOL/L (ref 98–107)
CHOLEST SERPL-MCNC: 171 MG/DL (ref 0–200)
CO2 SERPL-SCNC: 24 MMOL/L (ref 22–29)
CREAT SERPL-MCNC: 0.79 MG/DL (ref 0.57–1)
DEPRECATED RDW RBC AUTO: 38.6 FL (ref 37–54)
EGFRCR SERPLBLD CKD-EPI 2021: 87.4 ML/MIN/1.73
ERYTHROCYTE [DISTWIDTH] IN BLOOD BY AUTOMATED COUNT: 12 % (ref 12.3–15.4)
GLOBULIN UR ELPH-MCNC: 2.6 GM/DL
GLUCOSE SERPL-MCNC: 124 MG/DL (ref 65–99)
HBA1C MFR BLD: 6.1 % (ref 4.8–5.6)
HCT VFR BLD AUTO: 39.2 % (ref 34–46.6)
HCV AB SER DONR QL: NORMAL
HDLC SERPL-MCNC: 42 MG/DL (ref 40–60)
HGB BLD-MCNC: 13.7 G/DL (ref 12–15.9)
LDLC SERPL CALC-MCNC: 78 MG/DL (ref 0–100)
LDLC/HDLC SERPL: 1.55 {RATIO}
MCH RBC QN AUTO: 30.7 PG (ref 26.6–33)
MCHC RBC AUTO-ENTMCNC: 34.9 G/DL (ref 31.5–35.7)
MCV RBC AUTO: 87.9 FL (ref 79–97)
PLATELET # BLD AUTO: 297 10*3/MM3 (ref 140–450)
PMV BLD AUTO: 10.5 FL (ref 6–12)
POTASSIUM SERPL-SCNC: 4 MMOL/L (ref 3.5–5.2)
PROT SERPL-MCNC: 7.7 G/DL (ref 6–8.5)
RBC # BLD AUTO: 4.46 10*6/MM3 (ref 3.77–5.28)
SODIUM SERPL-SCNC: 140 MMOL/L (ref 136–145)
T4 FREE SERPL-MCNC: 1.18 NG/DL (ref 0.93–1.7)
TRIGL SERPL-MCNC: 319 MG/DL (ref 0–150)
TSH SERPL DL<=0.05 MIU/L-ACNC: 1.59 UIU/ML (ref 0.27–4.2)
VIT B12 BLD-MCNC: 525 PG/ML (ref 211–946)
VLDLC SERPL-MCNC: 51 MG/DL (ref 5–40)
WBC NRBC COR # BLD: 6.85 10*3/MM3 (ref 3.4–10.8)

## 2023-03-20 DIAGNOSIS — N39.3 STRESS INCONTINENCE OF URINE: Primary | ICD-10-CM

## 2023-06-15 ENCOUNTER — OFFICE VISIT (OUTPATIENT)
Dept: FAMILY MEDICINE CLINIC | Facility: CLINIC | Age: 58
End: 2023-06-15
Payer: COMMERCIAL

## 2023-06-15 VITALS
DIASTOLIC BLOOD PRESSURE: 90 MMHG | SYSTOLIC BLOOD PRESSURE: 118 MMHG | HEART RATE: 92 BPM | OXYGEN SATURATION: 99 % | BODY MASS INDEX: 25.27 KG/M2 | TEMPERATURE: 98.2 F | WEIGHT: 138.2 LBS

## 2023-06-15 DIAGNOSIS — I10 ESSENTIAL HYPERTENSION: Chronic | ICD-10-CM

## 2023-06-15 DIAGNOSIS — R73.03 PREDIABETES: Chronic | ICD-10-CM

## 2023-06-15 DIAGNOSIS — E78.2 MIXED HYPERLIPIDEMIA: Chronic | ICD-10-CM

## 2023-06-15 DIAGNOSIS — Z00.00 ENCOUNTER FOR WELLNESS EXAMINATION: Primary | ICD-10-CM

## 2023-06-15 DIAGNOSIS — Z12.31 ENCOUNTER FOR SCREENING MAMMOGRAM FOR MALIGNANT NEOPLASM OF BREAST: ICD-10-CM

## 2023-06-15 DIAGNOSIS — F33.1 MAJOR DEPRESSIVE DISORDER, RECURRENT EPISODE, MODERATE DEGREE: ICD-10-CM

## 2023-06-15 DIAGNOSIS — I47.1 SVT (SUPRAVENTRICULAR TACHYCARDIA): ICD-10-CM

## 2023-06-15 DIAGNOSIS — Z00.00 HEALTH CARE MAINTENANCE: ICD-10-CM

## 2023-06-15 DIAGNOSIS — F41.1 GENERALIZED ANXIETY DISORDER: Chronic | ICD-10-CM

## 2023-06-15 RX ORDER — LISINOPRIL 10 MG/1
10 TABLET ORAL DAILY
Qty: 90 TABLET | Refills: 1 | Status: SHIPPED | OUTPATIENT
Start: 2023-06-15

## 2023-06-15 RX ORDER — METOPROLOL SUCCINATE 50 MG/1
50 TABLET, EXTENDED RELEASE ORAL
Qty: 90 TABLET | Refills: 1 | Status: SHIPPED | OUTPATIENT
Start: 2023-06-15

## 2023-06-15 RX ORDER — ESCITALOPRAM OXALATE 10 MG/1
10 TABLET ORAL DAILY
Qty: 30 TABLET | Refills: 5 | Status: SHIPPED | OUTPATIENT
Start: 2023-06-15

## 2023-06-15 RX ORDER — LORAZEPAM 0.5 MG/1
0.5 TABLET ORAL 2 TIMES DAILY PRN
Qty: 60 TABLET | Refills: 2 | Status: SHIPPED | OUTPATIENT
Start: 2023-06-15

## 2023-06-15 RX ORDER — ROSUVASTATIN CALCIUM 10 MG/1
10 TABLET, COATED ORAL DAILY
Qty: 90 TABLET | Refills: 1 | Status: SHIPPED | OUTPATIENT
Start: 2023-06-15

## 2023-06-15 NOTE — PROGRESS NOTES
Patient Name: Nicole Cannon Today's Date: 6/15/2023   Patient MRN / CSN: [id number] / [id number] Date of Encounter: 6/15/2023   Patient Age / : 57 y.o. / 1965 Encounter Provider: Swathi Davis DO   Referring Physician: No ref. provider found          Nicole is a 57 y.o. female who is being seen today for Annual Exam and Depression      History of Present Illness        Pt presents for HM exam today. Pt reports doing well.      Healthy Diet: trying to  Exercise: not much  Regular Eye Exam: due, pt plans to schedule soon  Regular Dental Exam: not utd, plans to schedule soon  Hearing Loss: no   Immunizations: recommended updating shingrix and patient plans to do so soon.   Mammogram: utd, due   Pap: done 2 years ago in [place]  Colonoscopy: cologuard done and negative .     Nicole complains of worsening depression over the past 2-3 months. She denies suicidal ideations. She reports not having dinh and it being a chore to get going in the mornings. She has had increased family stressors contributing to her symptoms. She reports anxiety has been worse, despite taking ativan twice a day. She has a h/o recurrent depression and stopped effexor previously due to weight gain. She has been off it about 6 months.     Allergies include:Patient has no known allergies.  Current Outpatient Medications   Medication Sig Dispense Refill   • Calcium Carbonate (CALCIUM 500 PO) Take  by mouth.     • cetirizine (zyrTEC) 10 MG tablet Take 1 tablet by mouth Daily.     • lansoprazole (PREVACID) 15 MG capsule Take 1 capsule by mouth Daily.     • lisinopril (PRINIVIL,ZESTRIL) 10 MG tablet Take 1 tablet by mouth Daily. 90 tablet 1   • LORazepam (ATIVAN) 0.5 MG tablet Take 1 tablet by mouth 2 (Two) Times a Day As Needed for Anxiety. 60 tablet 2   • metoprolol succinate XL (TOPROL-XL) 50 MG 24 hr tablet Take 1 tablet by mouth Daily. 90 tablet 1   • multivitamin with minerals tablet tablet Take 1 tablet by mouth Daily.     •  rosuvastatin (CRESTOR) 10 MG tablet Take 1 tablet by mouth Daily. 90 tablet 1   • valACYclovir (Valtrex) 1000 MG tablet Take 1 tablet by mouth 3 (Three) Times a Day As Needed (shingles outbreak). prn 90 tablet 1   • VITAMIN D PO Take  by mouth.     • escitalopram (Lexapro) 10 MG tablet Take 1 tablet by mouth Daily. 30 tablet 5   • pseudoephedrine (SUDAFED) 30 MG tablet Take 1 tablet by mouth Every 4 (Four) Hours As Needed for Congestion.       No current facility-administered medications for this visit.     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 No pertinent surgical history.  Social History     Substance and Sexual Activity   Alcohol Use No     Social History     Tobacco Use   Smoking Status Never   Smokeless Tobacco Never     Social History     Substance and Sexual Activity   Drug Use No     Review of Systems   Constitutional: Positive for fatigue.   Respiratory:        Occasional shortness of air which patient attributes to anxiety   Cardiovascular: Negative for chest pain.   Gastrointestinal: Negative for blood in stool.        GI upset recently. She feels this is worsened by increased anxiety recently.    Genitourinary: Negative for hematuria.        Depression Assessment Review:  PHQ-9 Total Score: 22  Vital Signs & Measurements Taken This Encounter  /90 (BP Location: Left arm, Patient Position: Sitting,  Cuff Size: Adult)   Pulse 92   Temp 98.2 °F (36.8 °C) (Temporal)   Wt 62.7 kg (138 lb 3.2 oz)   SpO2 99%   BMI 25.27 kg/m²    SpO2 Percentage    06/15/23 1354   SpO2: 99%               Physical Exam  Vitals reviewed.   Constitutional:       General: She is not in acute distress.  HENT:      Head: Normocephalic and atraumatic.   Eyes:      General: No scleral icterus.     Extraocular Movements: Extraocular movements intact.      Conjunctiva/sclera: Conjunctivae normal.      Pupils: Pupils are equal, round, and reactive to light.   Cardiovascular:      Rate and Rhythm: Normal rate and regular rhythm.   Pulmonary:      Effort: Pulmonary effort is normal. No respiratory distress.      Breath sounds: Normal breath sounds. No wheezing or rhonchi.   Musculoskeletal:         General: No swelling.      Cervical back: Neck supple. No tenderness.   Lymphadenopathy:      Cervical: No cervical adenopathy.   Skin:     General: Skin is warm and dry.      Coloration: Skin is not jaundiced.   Neurological:      Mental Status: She is alert.   Psychiatric:      Comments: Flat affect, tearful in the office today.  Patient makes appropriate eye contact.  She denies suicidal ideation.              Assessment & Plan  Patient Active Problem List   Diagnosis   • Atopic rhinitis   • Generalized anxiety disorder   • Menopause present   • SVT (supraventricular tachycardia) (HCC)   • Arthritis   • Paroxysmal SVT (supraventricular tachycardia)   • Essential hypertension   • Mixed hyperlipidemia   • Shingles   • Stress incontinence of urine   • Chronic fatigue   • Health care maintenance   • Prediabetes   • Major depressive disorder, recurrent episode, moderate degree       ICD-10-CM ICD-9-CM   1. Encounter for wellness examination  Z00.00 V70.0   2. Health care maintenance  Z00.00 V70.0   3. Major depressive disorder, recurrent episode, moderate degree  F33.1 296.32   4. Generalized anxiety disorder  F41.1 300.02   5. Encounter for  screening mammogram for malignant neoplasm of breast  Z12.31 V76.12   6. Prediabetes  R73.03 790.29   7. Mixed hyperlipidemia  E78.2 272.2   8. Essential hypertension  I10 401.9   9. SVT (supraventricular tachycardia)  I47.1 427.89     Orders Placed This Encounter   Procedures   • Mammo Screening Digital Tomosynthesis Bilateral With CAD     Schedule after 9/5/23.     Standing Status:   Future     Standing Expiration Date:   6/15/2024     Scheduling Instructions:      Schedule after 9/5/23.     Order Specific Question:   Reason for Exam:     Answer:   screening for breast ca   • CBC (No Diff)     Standing Status:   Future     Standing Expiration Date:   6/15/2024     Order Specific Question:   Release to patient     Answer:   Routine Release   • Comprehensive Metabolic Panel     Standing Status:   Future     Standing Expiration Date:   6/15/2024     Order Specific Question:   Release to patient     Answer:   Routine Release   • Lipid Panel     Standing Status:   Future     Standing Expiration Date:   6/15/2024   • TSH     Standing Status:   Future     Standing Expiration Date:   6/15/2024     Order Specific Question:   Release to patient     Answer:   Routine Release   • Hemoglobin A1c     Standing Status:   Future     Standing Expiration Date:   6/15/2024     Order Specific Question:   Release to patient     Answer:   Routine Release   • CK     Standing Status:   Future     Standing Expiration Date:   6/15/2024     Order Specific Question:   Release to patient     Answer:   Routine Release   • Ambulatory Referral to Psychology     Referral Priority:   Routine     Referral Type:   Behavorial Health/Psych     Referral Reason:   Specialty Services Required     Requested Specialty:   Psychology     Number of Visits Requested:   1   • Ambulatory Referral to Gynecology     Referral Priority:   Routine     Referral Type:   Consultation     Referral Reason:   Specialty Services Required     Requested Specialty:   Gynecology      Number of Visits Requested:   1       Meds Ordered During Visit:  New Medications Ordered This Visit   Medications   • escitalopram (Lexapro) 10 MG tablet     Sig: Take 1 tablet by mouth Daily.     Dispense:  30 tablet     Refill:  5   • LORazepam (ATIVAN) 0.5 MG tablet     Sig: Take 1 tablet by mouth 2 (Two) Times a Day As Needed for Anxiety.     Dispense:  60 tablet     Refill:  2   • rosuvastatin (CRESTOR) 10 MG tablet     Sig: Take 1 tablet by mouth Daily.     Dispense:  90 tablet     Refill:  1   • metoprolol succinate XL (TOPROL-XL) 50 MG 24 hr tablet     Sig: Take 1 tablet by mouth Daily.     Dispense:  90 tablet     Refill:  1   • lisinopril (PRINIVIL,ZESTRIL) 10 MG tablet     Sig: Take 1 tablet by mouth Daily.     Dispense:  90 tablet     Refill:  1     Immunizations were discussed with patient today.  I recommend she update Shingrix, which she plans to do at her pharmacy soon.    Age-appropriate screenings were discussed with patient today.  She is not currently due for mammogram but I will go ahead and order this for the fall, when due.  She request a referral to gynecology.  I will refer her to Dr. Samia Mccrary.  She is up-to-date on Cologuard.  We will update metabolic screenings as above just prior to next appointment.    I discussed Lexapro with patient today.  She may continue Ativan as previously prescribed as needed for anxiety.  I reviewed PDMP today.  I updated medicine refills today as requested.    Return in about 6 weeks (around 7/27/2023), or if symptoms worsen or fail to improve, for Recheck.          Referring Provider (if known): No ref. provider found      This document has been electronically signed by Swathi Davis DO  [place] 15, 2023 14:54 EDT    Swathi Davis DO, [facility]  [address] [place] [address]  [phone] (office)    Part of this note may be an electronic transcription/translation of spoken language to printed text using the Dragon Dictation System.

## 2023-08-07 ENCOUNTER — CLINICAL SUPPORT (OUTPATIENT)
Dept: FAMILY MEDICINE CLINIC | Facility: CLINIC | Age: 58
End: 2023-08-07
Payer: COMMERCIAL

## 2023-08-07 DIAGNOSIS — R73.03 PREDIABETES: Chronic | ICD-10-CM

## 2023-08-07 DIAGNOSIS — I10 ESSENTIAL HYPERTENSION: Chronic | ICD-10-CM

## 2023-08-07 DIAGNOSIS — E78.2 MIXED HYPERLIPIDEMIA: Chronic | ICD-10-CM

## 2023-08-07 PROCEDURE — 83036 HEMOGLOBIN GLYCOSYLATED A1C: CPT | Performed by: INTERNAL MEDICINE

## 2023-08-07 PROCEDURE — 80050 GENERAL HEALTH PANEL: CPT | Performed by: INTERNAL MEDICINE

## 2023-08-07 PROCEDURE — 82550 ASSAY OF CK (CPK): CPT | Performed by: INTERNAL MEDICINE

## 2023-08-07 PROCEDURE — 36415 COLL VENOUS BLD VENIPUNCTURE: CPT | Performed by: INTERNAL MEDICINE

## 2023-08-07 PROCEDURE — 80061 LIPID PANEL: CPT | Performed by: INTERNAL MEDICINE

## 2023-08-07 NOTE — PROGRESS NOTES
Venipuncture Blood Specimen Collection  Venipuncture performed in LEFT ARM by Natasha Zavala RN with good hemostasis. Patient tolerated the procedure well without complications.   08/07/23   Natasha Zavala RN

## 2023-08-08 LAB
ALBUMIN SERPL-MCNC: 4.6 G/DL (ref 3.5–5.2)
ALBUMIN/GLOB SERPL: 1.8 G/DL
ALP SERPL-CCNC: 80 U/L (ref 39–117)
ALT SERPL W P-5'-P-CCNC: 23 U/L (ref 1–33)
ANION GAP SERPL CALCULATED.3IONS-SCNC: 13.2 MMOL/L (ref 5–15)
AST SERPL-CCNC: 23 U/L (ref 1–32)
BILIRUB SERPL-MCNC: 0.4 MG/DL (ref 0–1.2)
BUN SERPL-MCNC: 13 MG/DL (ref 6–20)
BUN/CREAT SERPL: 14.6 (ref 7–25)
CALCIUM SPEC-SCNC: 9.7 MG/DL (ref 8.6–10.5)
CHLORIDE SERPL-SCNC: 98 MMOL/L (ref 98–107)
CHOLEST SERPL-MCNC: 131 MG/DL (ref 0–200)
CK SERPL-CCNC: 56 U/L (ref 20–180)
CO2 SERPL-SCNC: 26.8 MMOL/L (ref 22–29)
CREAT SERPL-MCNC: 0.89 MG/DL (ref 0.57–1)
DEPRECATED RDW RBC AUTO: 39.8 FL (ref 37–54)
EGFRCR SERPLBLD CKD-EPI 2021: 75.3 ML/MIN/1.73
ERYTHROCYTE [DISTWIDTH] IN BLOOD BY AUTOMATED COUNT: 12.3 % (ref 12.3–15.4)
GLOBULIN UR ELPH-MCNC: 2.5 GM/DL
GLUCOSE SERPL-MCNC: 88 MG/DL (ref 65–99)
HBA1C MFR BLD: 5.9 % (ref 4.8–5.6)
HCT VFR BLD AUTO: 39.5 % (ref 34–46.6)
HDLC SERPL-MCNC: 40 MG/DL (ref 40–60)
HGB BLD-MCNC: 13.4 G/DL (ref 12–15.9)
LDLC SERPL CALC-MCNC: 68 MG/DL (ref 0–100)
LDLC/HDLC SERPL: 1.62 {RATIO}
MCH RBC QN AUTO: 30.2 PG (ref 26.6–33)
MCHC RBC AUTO-ENTMCNC: 33.9 G/DL (ref 31.5–35.7)
MCV RBC AUTO: 89.2 FL (ref 79–97)
PLATELET # BLD AUTO: 302 10*3/MM3 (ref 140–450)
PMV BLD AUTO: 10.3 FL (ref 6–12)
POTASSIUM SERPL-SCNC: 3.9 MMOL/L (ref 3.5–5.2)
PROT SERPL-MCNC: 7.1 G/DL (ref 6–8.5)
RBC # BLD AUTO: 4.43 10*6/MM3 (ref 3.77–5.28)
SODIUM SERPL-SCNC: 138 MMOL/L (ref 136–145)
TRIGL SERPL-MCNC: 132 MG/DL (ref 0–150)
TSH SERPL DL<=0.05 MIU/L-ACNC: 2.05 UIU/ML (ref 0.27–4.2)
VLDLC SERPL-MCNC: 23 MG/DL (ref 5–40)
WBC NRBC COR # BLD: 7.3 10*3/MM3 (ref 3.4–10.8)

## 2023-08-14 ENCOUNTER — OFFICE VISIT (OUTPATIENT)
Dept: PSYCHIATRY | Facility: CLINIC | Age: 58
End: 2023-08-14
Payer: COMMERCIAL

## 2023-08-14 ENCOUNTER — OFFICE VISIT (OUTPATIENT)
Dept: FAMILY MEDICINE CLINIC | Facility: CLINIC | Age: 58
End: 2023-08-14
Payer: COMMERCIAL

## 2023-08-14 VITALS
SYSTOLIC BLOOD PRESSURE: 138 MMHG | HEART RATE: 68 BPM | DIASTOLIC BLOOD PRESSURE: 68 MMHG | WEIGHT: 134 LBS | TEMPERATURE: 97.7 F | OXYGEN SATURATION: 95 % | BODY MASS INDEX: 24.5 KG/M2

## 2023-08-14 DIAGNOSIS — F33.1 MAJOR DEPRESSIVE DISORDER, RECURRENT EPISODE, MODERATE: ICD-10-CM

## 2023-08-14 DIAGNOSIS — R73.03 PREDIABETES: Chronic | ICD-10-CM

## 2023-08-14 DIAGNOSIS — E78.2 MIXED HYPERLIPIDEMIA: Chronic | ICD-10-CM

## 2023-08-14 DIAGNOSIS — F41.1 GENERALIZED ANXIETY DISORDER: Chronic | ICD-10-CM

## 2023-08-14 DIAGNOSIS — R45.81 LOW SELF ESTEEM: ICD-10-CM

## 2023-08-14 DIAGNOSIS — K21.9 GASTROESOPHAGEAL REFLUX DISEASE WITHOUT ESOPHAGITIS: ICD-10-CM

## 2023-08-14 DIAGNOSIS — F41.1 GENERALIZED ANXIETY DISORDER: Primary | ICD-10-CM

## 2023-08-14 DIAGNOSIS — I10 ESSENTIAL HYPERTENSION: Primary | Chronic | ICD-10-CM

## 2023-08-14 DIAGNOSIS — Z63.0 MARITAL/PARTNER RELATIONAL PROBLEM: ICD-10-CM

## 2023-08-14 DIAGNOSIS — F33.1 MAJOR DEPRESSIVE DISORDER, RECURRENT EPISODE, MODERATE DEGREE: ICD-10-CM

## 2023-08-14 PROCEDURE — 99214 OFFICE O/P EST MOD 30 MIN: CPT | Performed by: INTERNAL MEDICINE

## 2023-08-14 PROCEDURE — 90837 PSYTX W PT 60 MINUTES: CPT | Performed by: SOCIAL WORKER

## 2023-08-14 RX ORDER — PANTOPRAZOLE SODIUM 40 MG/1
40 TABLET, DELAYED RELEASE ORAL DAILY
Qty: 90 TABLET | Refills: 3 | Status: SHIPPED | OUTPATIENT
Start: 2023-08-14

## 2023-08-14 RX ORDER — ESCITALOPRAM OXALATE 20 MG/1
20 TABLET ORAL DAILY
Qty: 90 TABLET | Refills: 3 | Status: SHIPPED | OUTPATIENT
Start: 2023-08-14

## 2023-08-14 SDOH — SOCIAL STABILITY - SOCIAL INSECURITY: PROBLEMS IN RELATIONSHIP WITH SPOUSE OR PARTNER: Z63.0

## 2023-08-14 NOTE — PROGRESS NOTES
Patient Name: Nicole Cannon Today's Date: 2023   Patient MRN / CSN: [id number] / [id number] Date of Encounter: 2023   Patient Age / : 58 y.o. / 1965 Encounter Provider: Swathi Davis DO   Referring Physician: No ref. provider found          Nicole is a 58 y.o. female who is being seen today for Hypertension, Follow-up, and Med Change Request (Protonix, pt insurance won't cover prevacid)      Hypertension  This is a chronic problem. The current episode started more than 1 year ago. Pertinent negatives include no chest pain or shortness of breath. Current antihypertension treatment includes ACE inhibitors. The current treatment provides significant improvement. There are no compliance problems.    Depression  Visit Type: follow-up  Patient presents with the following symptoms: depressed mood and nervousness/anxiety.  Patient is not experiencing: shortness of breath.   Current severity: Patient reports depression is significantly improved with the current Lexapro dose.  She reports still having some depression but much better.  She is tolerating this regimen well.  She also takes Ativan as needed for anxiety and reports tolerating it well.   Compliance with medications:  % (Patient has also started counseling with Brian and reports that this is helping)    Heartburn  She complains of heartburn. She reports no abdominal pain or no chest pain. The current episode started more than 1 year ago. She has tried a PPI for the symptoms. Improvement on treatment: Patient is interested in trying Protonix.  Insurance will not cover Prevacid and she is having heartburn at times despite it.     Allergies include:Patient has no known allergies.  Current Outpatient Medications   Medication Sig Dispense Refill    Calcium Carbonate (CALCIUM 500 PO) Take  by mouth.      cetirizine (zyrTEC) 10 MG tablet Take 1 tablet by mouth Daily.      escitalopram (Lexapro) 20 MG tablet Take 1 tablet by mouth Daily. 90 tablet  3    lisinopril (PRINIVIL,ZESTRIL) 10 MG tablet Take 1 tablet by mouth Daily. 90 tablet 1    LORazepam (ATIVAN) 0.5 MG tablet Take 1 tablet by mouth 2 (Two) Times a Day As Needed for Anxiety. 60 tablet 2    metoprolol succinate XL (TOPROL-XL) 50 MG 24 hr tablet Take 1 tablet by mouth Daily. 90 tablet 1    multivitamin with minerals tablet tablet Take 1 tablet by mouth Daily.      pseudoephedrine (SUDAFED) 30 MG tablet Take 1 tablet by mouth Every 4 (Four) Hours As Needed for Congestion.      rosuvastatin (CRESTOR) 10 MG tablet Take 1 tablet by mouth Daily. 90 tablet 1    valACYclovir (Valtrex) 1000 MG tablet Take 1 tablet by mouth 3 (Three) Times a Day As Needed (shingles outbreak). prn 90 tablet 1    VITAMIN D PO Take  by mouth.      pantoprazole (Protonix) 40 MG EC tablet Take 1 tablet by mouth Daily. 90 tablet 3     No current facility-administered medications for this visit.     Past Medical History:   Diagnosis Date    Anxiety     Arrhythmia     CRPS (complex regional pain syndrome) type I of lower limb     Dysfunctional uterine bleeding     Dysmenorrhea     Foot fracture     Fracture     left shoulder, right knee    Hand pain     Hyperlipidemia     Itching     Low back pain     Lump of skin     [place] Hunt syndrome (geniculate herpes zoster)     Seborrheic keratoses      Family History   Problem Relation Age of Onset    Diabetes Mother     Hyperlipidemia Mother     Hypertension Mother     Thyroid disease Mother     Hyperlipidemia Father     Cancer Father         PROSTATE     History reviewed. No pertinent surgical history.  Social History     Substance and Sexual Activity   Alcohol Use No     Social History     Tobacco Use   Smoking Status Never   Smokeless Tobacco Never     Social History     Substance and Sexual Activity   Drug Use No     Review of Systems   Respiratory:  Negative for shortness of breath.    Cardiovascular:  Negative for chest pain.   Gastrointestinal:  Positive for heartburn. Negative for  abdominal pain and blood in stool.   Genitourinary:  Negative for hematuria.   Psychiatric/Behavioral:  The patient is nervous/anxious.       Depression Assessment Review:  PHQ-9 Total Score:    Vital Signs & Measurements Taken This Encounter  /68 (BP Location: Left arm, Patient Position: Sitting, Cuff Size: Adult)   Pulse 68   Temp 97.7 øF (36.5 øC) (Temporal)   Wt 60.8 kg (134 lb)   SpO2 95%   BMI 24.50 kg/mý    SpO2 Percentage    08/14/23 1640   SpO2: 95%        BMI is within normal parameters. No other follow-up for BMI required.      Physical Exam  Vitals reviewed.   Constitutional:       General: She is not in acute distress.  HENT:      Head: Normocephalic and atraumatic.   Eyes:      General: No scleral icterus.     Extraocular Movements: Extraocular movements intact.      Conjunctiva/sclera: Conjunctivae normal.      Pupils: Pupils are equal, round, and reactive to light.   Cardiovascular:      Rate and Rhythm: Normal rate and regular rhythm.   Pulmonary:      Effort: Pulmonary effort is normal. No respiratory distress.      Breath sounds: Normal breath sounds. No wheezing or rhonchi.   Musculoskeletal:         General: No swelling.   Skin:     General: Skin is warm and dry.      Coloration: Skin is not jaundiced.   Neurological:      Mental Status: She is alert.   Psychiatric:         Mood and Affect: Mood normal.         Behavior: Behavior normal.         Thought Content: Thought content normal.         Judgment: Judgment normal.      Comments: Pleasant, well-kempt, nontearful, smiling and cooperative.          Assessment & Plan  Patient Active Problem List   Diagnosis    Atopic rhinitis    Generalized anxiety disorder    Menopause present    SVT (supraventricular tachycardia)    Arthritis    Paroxysmal SVT (supraventricular tachycardia)    Essential hypertension    Mixed hyperlipidemia    Shingles    Stress incontinence of urine    Chronic fatigue    Health care maintenance    Prediabetes     Major depressive disorder, recurrent episode, moderate degree    Gastroesophageal reflux disease without esophagitis       ICD-10-CM ICD-9-CM   1. Essential hypertension  I10 401.9   2. Gastroesophageal reflux disease without esophagitis  K21.9 530.81   3. Generalized anxiety disorder  F41.1 300.02   4. Major depressive disorder, recurrent episode, moderate degree  F33.1 296.32   5. Prediabetes  R73.03 790.29   6. Mixed hyperlipidemia  E78.2 272.2     Orders Placed This Encounter   Procedures    Comprehensive Metabolic Panel     Standing Status:   Future     Standing Expiration Date:   8/14/2024     Order Specific Question:   Release to patient     Answer:   Routine Release [[id number]]    Hemoglobin A1c     Standing Status:   Future     Standing Expiration Date:   8/14/2024     Order Specific Question:   Release to patient     Answer:   Routine Release [[id number]]    Lipid Panel     Standing Status:   Future     Standing Expiration Date:   8/14/2024     Order Specific Question:   Release to patient     Answer:   Routine Release [[id number]]    TSH     Standing Status:   Future     Standing Expiration Date:   8/14/2024     Order Specific Question:   Release to patient     Answer:   Routine Release [[id number]]       Meds Ordered During Visit:  New Medications Ordered This Visit   Medications    pantoprazole (Protonix) 40 MG EC tablet     Sig: Take 1 tablet by mouth Daily.     Dispense:  90 tablet     Refill:  3    escitalopram (Lexapro) 20 MG tablet     Sig: Take 1 tablet by mouth Daily.     Dispense:  90 tablet     Refill:  3     I recommended increasing Lexapro to 20 mg daily.  We will change Prevacid to Protonix.  We will continue other medications as previously prescribed.  I discussed increasing exercise for A1c improvement.  We will plan to update labs just prior to next appointment.    Return in about 3 months (around 11/14/2023), or if symptoms worsen or fail to improve, for Recheck.          Referring  Provider (if known): No ref. provider found      This document has been electronically signed by Swathi Davis DO  August 14, 2023 17:39 EDT    Swathi Davis DO, [facility]  [address]  [place], [place] [address] [phone] (office)    Part of this note may be an electronic transcription/translation of spoken language to printed text using the Dragon Dictation System.

## 2023-08-15 NOTE — PROGRESS NOTES
"Date of Service: August 14, 2023  Time In: 11:05 AM  Time Out: 12:10 PM    IDENTIFYING  INFORMATION:   Nicole Cannon is a 58 y.o. female who met 1:1 with the undersigned for a regularly scheduled individual outpatient therapy session at [facility].    Chief complaint: Depression; anxiety; marital discord; low self-esteem    HPI: Patient reports she believes things are slightly better since initial evaluation due to the fact that she has now been on antidepressants for approximately 2 months and the fact that she believes her  is attempting to curtail his drinking.  She states she believes this is partly due to the fact that he has realized how much it is bothering her.  However, she also states she believes it is due to the fact that he has suddenly become obsessed with losing weight and states he is walking approximately 10 miles daily and obsessively counting calories which she believes has led to his decrease in alcohol intake due to a fear of excess calories.  Patient continues to report she struggles within the relationship and states \"I have not liked him for a long time\".  Patient also states her  has always been \"volatile\" states although he has never been violent with her she has always been afraid of him which she believes is also one of the reasons that she has stayed in the relationship for over 40 years.  Patient also states one of the reasons she has remained in the relationship was her [employer] belief that she could not leave the marriage as there has not been infidelity to her knowledge.  However, she states she believes she has overcome that obstacle as she simply believes she has a right to try to be happy.  Patient reports she believes she is doing somewhat better but continues to struggle with depression as evidenced by depressed mood, anhedonia, anergia, periodic crying spells, periods of feeling helpless and hopeless.  Long history of low self-esteem, and " "social isolation.  Patient also reports symptoms of anxiety including anxious mood, feeling on edge, feeling overwhelmed, increased heart rate, mind goes blank, and a sense of impending doom.  Patient rates current symptoms of depression at a 4 and anxiety at a 6 on a scale of 1-10.  Patient has difficulty writing feelings of low self-esteem; however, she states \"I have never felt like I deserved anything\".  Patient reports she continues to adhere to medication regimen as prescribed.  The patient adamantly convincingly denies suicidal ideation.  Patient vehemently denies any substance use.        Clinical Maneuvering/Intervention: Discussed the therapy/patient relationship and explained the parameters and limitations of relative confidentiality.  Also discussed the importance of regular attendance, active participation, and honesty to the treatment process.  Validated the patient's belief that she is doing somewhat better and praised her efforts.  However, also utilized cognitive behavioral therapy to point out the fact patient appears to be attempting to find ways to accept things she is simply not okay with rather than addressing the core cause of her symptomology.  Also utilized Socratic questioning and motivational reviewing techniques to attempt to assist the patient in recognizing her low self-esteem and the discrepancy with her on intellectual beliefs.  Further validated the patient's autonomy in her and right to choose how to proceed and how to deal with this situation but also encouraged her to be careful and assisted her in developing a safety plan in the event she did decide to leave the relationship.  Provided unconditional positive regard in a safe, supportive environment.    Allowed patient to freely discuss issues without interruption or judgment. Provided safe, confidential environment to facilitate the development and maintenance of positive therapeutic relationship. Assisted patient in identifying " increased risk factors which would indicate the need for higher level of care including thoughts to harm self or others and/or self-harming behavior and encouraged patient to contact this office, call 911, or present to nearest emergency room should either event occur. Discussed crisis intervention services and means to access.          Assessment: Patient has a long history of depression and anxiety which appears to be primarily precipitated by strained relationship in her marriage for the past 40 years.  The patient symptomology continues to cause significant impairment in important areas of functioning.  As a result, the patient can be reasonably expected to benefit from ongoing treatment and would likely be at increased risk for decompensation otherwise.    DIAGNOSIS:   Assessment & Plan   Diagnoses and all orders for this visit:    1. Generalized anxiety disorder (Primary)    2. Major depressive disorder, recurrent episode, moderate    3. Marital/partner relational problem    4. Low self esteem               Mental Status Exam: Mental Status Exam:   Hygiene:   good  Cooperation:  Cooperative  Eye Contact:  Good  Psychomotor Behavior:  Appropriate  Affect:  Full range  Speech:  Normal  Thought Progress:  Linear  Thought Content:  Normal  Suicidal:  None  Homicidal:  None  Hallucinations:  None  Delusion:  None  Memory:  Intact  Orientation:  Person, Place, Time, and Situation  Reliability:  fair  Insight:  Fair  Judgement:  Fair  Impulse Control:  Fair        Patient's Support Network Includes: Immediate family     Progress toward goal: Not at goal     Functional Status: Moderate impairment      Prognosis: Guarded with Ongoing Treatment         Plan               Patient will continue in individual outpatient therapy session [facility] every 3 weeks and pharmacotherapy as scheduled.  Patient will adhere to medication regimen as prescribed and report any side effects. Patient will  contact this office, call 911 or present to the nearest emergency room should suicidal or homicidal ideations occur. Provide Cognitive Behavioral Therapy and Integrative Therapy to improve functioning, maintain stability, and avoid decompensation and the need for higher level of care.              Return in about 3 weeks (around 9/4/2023) for Next scheduled follow up.        Brian Louie LCSW     This document signed by Brian Louie LCSW, [facility] August 15, 2023 07:16 EDT

## 2023-08-21 ENCOUNTER — PRIOR AUTHORIZATION (OUTPATIENT)
Dept: FAMILY MEDICINE CLINIC | Facility: CLINIC | Age: 58
End: 2023-08-21
Payer: COMMERCIAL

## 2023-08-21 NOTE — TELEPHONE ENCOUNTER
Medication: pantoprazole (Protonix) 40 mg    Prior Auth: APPROVED    Coverage starts: 08/21/2023     Coverage Ends: 08/20/2026

## 2023-09-18 ENCOUNTER — OFFICE VISIT (OUTPATIENT)
Dept: PSYCHIATRY | Facility: CLINIC | Age: 58
End: 2023-09-18
Payer: COMMERCIAL

## 2023-09-18 DIAGNOSIS — Z63.0 MARITAL/PARTNER RELATIONAL PROBLEM: ICD-10-CM

## 2023-09-18 DIAGNOSIS — R45.81 LOW SELF ESTEEM: ICD-10-CM

## 2023-09-18 DIAGNOSIS — F41.1 GENERALIZED ANXIETY DISORDER: Primary | ICD-10-CM

## 2023-09-18 DIAGNOSIS — F33.1 MAJOR DEPRESSIVE DISORDER, RECURRENT EPISODE, MODERATE: ICD-10-CM

## 2023-09-18 SDOH — SOCIAL STABILITY - SOCIAL INSECURITY: PROBLEMS IN RELATIONSHIP WITH SPOUSE OR PARTNER: Z63.0

## 2023-09-19 NOTE — PROGRESS NOTES
"Date of Service:  September 18, 2023  Time In: 10:45 AM  Time Out: 11:45 AM    IDENTIFYING  INFORMATION:   Nicole Cannon is a 58 y.o. female who met 1:1 with the undersigned for a regularly scheduled individual outpatient therapy session at [facility] [place] [place].    Chief complaint: Depression; anxiety; marital discord; low self-esteem    HPI: Patient states things have reverted back to \"the old way\" in her marriage and states her  has began drinking again which has exacerbated the difficulties in their marriage.  The patient states things are not good and that she attempts to avoid him as much as possible.  In fact, she states he works out of town during the week where he has an apartment and asked her to come there but she attempts to avoid it as much as possible.  Patient states her 2 adult daughters have essentially stated that they are \"done with him\" and will not allow him to be around their children any more than they can help.  Patient states she realizes the reason she stayed in the marriage for 40 years was that when her children were younger she knew she could not leave them alone with him for any amount of time so she knew she had to be the \"buffer\".   Patient reports she believes she is doing somewhat better but continues to struggle with depression as evidenced by depressed mood, anhedonia, anergia, periodic crying spells, periods of feeling helpless and hopeless.  Long history of low self-esteem, and social isolation.  Patient also reports symptoms of anxiety including anxious mood, feeling on edge, feeling overwhelmed, increased heart rate, mind goes blank, and a sense of impending doom.  Patient rates current symptoms of depression at a 4 and anxiety at a 6 on a scale of 1-10.  Patient has difficulty writing feelings of low self-esteem; however, she states \"I have never felt like I deserved anything\".  Patient reports she continues to adhere to medication regimen as prescribed.  " The patient adamantly convincingly denies suicidal ideation.  Patient vehemently denies any substance use.        Clinical Maneuvering/Intervention: This session was primarily focused on utilizing cognitive behavioral therapy to identify the patient's faulty, irrational thoughts which have developed her core beliefs and facilitated her acceptance that she is not able to change her situation and began the process of assisting the patient with understanding that she can challenge and restructure her thought process.  Patient agreed to the homework of writing down on a piece of paper why she deserves to have a better life and to make decisions to bring about positive change.  Also utilized Socratic questioning and motivational reviewing techniques to attempt to assist the patient in recognizing her low self-esteem and the discrepancy with her on intellectual beliefs.  Further validated the patient's autonomy in her and right to choose how to proceed and how to deal with this situation but also encouraged her to be careful and assisted her in developing a safety plan in the event she did decide to leave the relationship.  Provided unconditional positive regard in a safe, supportive environment.    Allowed patient to freely discuss issues without interruption or judgment. Provided safe, confidential environment to facilitate the development and maintenance of positive therapeutic relationship. Assisted patient in identifying increased risk factors which would indicate the need for higher level of care including thoughts to harm self or others and/or self-harming behavior and encouraged patient to contact this office, call 911, or present to nearest emergency room should either event occur. Discussed crisis intervention services and means to access.          Assessment: Patient has a long history of depression and anxiety which appears to be primarily precipitated by strained relationship in her marriage for the past 40  years.  The patient symptomology continues to cause significant impairment in important areas of functioning.  As a result, the patient can be reasonably expected to benefit from ongoing treatment and would likely be at increased risk for decompensation otherwise.    DIAGNOSIS:   Assessment & Plan   Diagnoses and all orders for this visit:    1. Generalized anxiety disorder (Primary)    2. Major depressive disorder, recurrent episode, moderate    3. Marital/partner relational problem    4. Low self esteem               Mental Status Exam: Mental Status Exam:   Hygiene:   good  Cooperation:  Cooperative  Eye Contact:  Good  Psychomotor Behavior:  Appropriate  Affect:  Full range  Speech:  Normal  Thought Progress:  Linear  Thought Content:  Normal  Suicidal:  None  Homicidal:  None  Hallucinations:  None  Delusion:  None  Memory:  Intact  Orientation:  Person, Place, Time, and Situation  Reliability:  fair  Insight:  Fair  Judgement:  Fair  Impulse Control:  Fair        Patient's Support Network Includes: Immediate family     Progress toward goal: Not at goal     Functional Status: Moderate impairment      Prognosis: Guarded with Ongoing Treatment         Plan               Patient will continue in individual outpatient therapy session [facility] every 3 weeks and pharmacotherapy as scheduled.  Patient will adhere to medication regimen as prescribed and report any side effects. Patient will contact this office, call 911 or present to the nearest emergency room should suicidal or homicidal ideations occur. Provide Cognitive Behavioral Therapy and Integrative Therapy to improve functioning, maintain stability, and avoid decompensation and the need for higher level of care.              Return in about 3 weeks (around 10/9/2023) for Next scheduled follow up.        Brian Louie LCSW     This document signed by Brian Louie LCSW, [facility] September 19, 2023 07:13 EDT

## 2023-10-09 DIAGNOSIS — F41.1 GENERALIZED ANXIETY DISORDER: Chronic | ICD-10-CM

## 2023-10-09 RX ORDER — LORAZEPAM 0.5 MG/1
0.5 TABLET ORAL 2 TIMES DAILY PRN
Qty: 60 TABLET | Refills: 2 | Status: SHIPPED | OUTPATIENT
Start: 2023-10-09

## 2023-10-09 NOTE — TELEPHONE ENCOUNTER
Caller: Davis Nicole K    Relationship: Self    Best call back number: [phone]     Requested Prescriptions:   Requested Prescriptions     Pending Prescriptions Disp Refills    LORazepam (ATIVAN) 0.5 MG tablet 60 tablet 2     Sig: Take 1 tablet by mouth 2 (Two) Times a Day As Needed for Anxiety.        Pharmacy where request should be sent: [id number] - [place], [place] - [address] AT [address] [phone] [facility] [place] [phone]      Last office visit with prescribing clinician: 8/14/2023   Last telemedicine visit with prescribing clinician: Visit date not found   Next office visit with prescribing clinician: 11/15/2023     Additional details provided by patient:     Does the patient have less than a 3 day supply:  [x] Yes  [] No    Would you like a call back once the refill request has been completed: [x] Yes [] No    If the office needs to give you a call back, can they leave a voicemail: [x] Yes [] No    June Rios Rep   10/09/23 13:14 EDT

## 2023-11-07 ENCOUNTER — CLINICAL SUPPORT (OUTPATIENT)
Dept: FAMILY MEDICINE CLINIC | Facility: CLINIC | Age: 58
End: 2023-11-07
Payer: COMMERCIAL

## 2023-11-07 DIAGNOSIS — R73.03 PREDIABETES: Chronic | ICD-10-CM

## 2023-11-07 DIAGNOSIS — I10 ESSENTIAL HYPERTENSION: Chronic | ICD-10-CM

## 2023-11-07 DIAGNOSIS — E78.2 MIXED HYPERLIPIDEMIA: Chronic | ICD-10-CM

## 2023-11-07 PROCEDURE — 80053 COMPREHEN METABOLIC PANEL: CPT | Performed by: INTERNAL MEDICINE

## 2023-11-07 PROCEDURE — 80061 LIPID PANEL: CPT | Performed by: INTERNAL MEDICINE

## 2023-11-07 PROCEDURE — 84443 ASSAY THYROID STIM HORMONE: CPT | Performed by: INTERNAL MEDICINE

## 2023-11-07 PROCEDURE — 36415 COLL VENOUS BLD VENIPUNCTURE: CPT | Performed by: INTERNAL MEDICINE

## 2023-11-07 PROCEDURE — 83036 HEMOGLOBIN GLYCOSYLATED A1C: CPT | Performed by: INTERNAL MEDICINE

## 2023-11-07 NOTE — PROGRESS NOTES
Venipuncture Blood Specimen Collection  Venipuncture performed in Right arm by Yin Jordan MA with good hemostasis. Patient tolerated the procedure well without complications.   11/07/23   Yin Jordan MA

## 2023-11-08 LAB
ALBUMIN SERPL-MCNC: 5.2 G/DL (ref 3.5–5.2)
ALBUMIN/GLOB SERPL: 2 G/DL
ALP SERPL-CCNC: 87 U/L (ref 39–117)
ALT SERPL W P-5'-P-CCNC: 19 U/L (ref 1–33)
ANION GAP SERPL CALCULATED.3IONS-SCNC: 12.9 MMOL/L (ref 5–15)
AST SERPL-CCNC: 19 U/L (ref 1–32)
BILIRUB SERPL-MCNC: 0.4 MG/DL (ref 0–1.2)
BUN SERPL-MCNC: 15 MG/DL (ref 6–20)
BUN/CREAT SERPL: 17.9 (ref 7–25)
CALCIUM SPEC-SCNC: 9.9 MG/DL (ref 8.6–10.5)
CHLORIDE SERPL-SCNC: 99 MMOL/L (ref 98–107)
CHOLEST SERPL-MCNC: 187 MG/DL (ref 0–200)
CO2 SERPL-SCNC: 27.1 MMOL/L (ref 22–29)
CREAT SERPL-MCNC: 0.84 MG/DL (ref 0.57–1)
EGFRCR SERPLBLD CKD-EPI 2021: 80.7 ML/MIN/1.73
GLOBULIN UR ELPH-MCNC: 2.6 GM/DL
GLUCOSE SERPL-MCNC: 97 MG/DL (ref 65–99)
HBA1C MFR BLD: 5.4 % (ref 4.8–5.6)
HDLC SERPL-MCNC: 44 MG/DL (ref 40–60)
LDLC SERPL CALC-MCNC: 105 MG/DL (ref 0–100)
LDLC/HDLC SERPL: 2.25 {RATIO}
POTASSIUM SERPL-SCNC: 4.4 MMOL/L (ref 3.5–5.2)
PROT SERPL-MCNC: 7.8 G/DL (ref 6–8.5)
SODIUM SERPL-SCNC: 139 MMOL/L (ref 136–145)
TRIGL SERPL-MCNC: 219 MG/DL (ref 0–150)
TSH SERPL DL<=0.05 MIU/L-ACNC: 2.51 UIU/ML (ref 0.27–4.2)
VLDLC SERPL-MCNC: 38 MG/DL (ref 5–40)

## 2023-11-13 ENCOUNTER — OFFICE VISIT (OUTPATIENT)
Dept: FAMILY MEDICINE CLINIC | Facility: CLINIC | Age: 58
End: 2023-11-13
Payer: COMMERCIAL

## 2023-11-13 ENCOUNTER — OFFICE VISIT (OUTPATIENT)
Dept: PSYCHIATRY | Facility: CLINIC | Age: 58
End: 2023-11-13
Payer: COMMERCIAL

## 2023-11-13 VITALS
HEIGHT: 62 IN | RESPIRATION RATE: 18 BRPM | DIASTOLIC BLOOD PRESSURE: 77 MMHG | HEART RATE: 59 BPM | OXYGEN SATURATION: 97 % | BODY MASS INDEX: 25.73 KG/M2 | SYSTOLIC BLOOD PRESSURE: 144 MMHG | WEIGHT: 139.8 LBS | TEMPERATURE: 97.5 F

## 2023-11-13 DIAGNOSIS — Z51.81 MEDICATION MONITORING ENCOUNTER: ICD-10-CM

## 2023-11-13 DIAGNOSIS — Z63.0 MARITAL/PARTNER RELATIONAL PROBLEM: ICD-10-CM

## 2023-11-13 DIAGNOSIS — F41.1 GENERALIZED ANXIETY DISORDER: Primary | ICD-10-CM

## 2023-11-13 DIAGNOSIS — R45.81 LOW SELF ESTEEM: ICD-10-CM

## 2023-11-13 DIAGNOSIS — F33.1 MAJOR DEPRESSIVE DISORDER, RECURRENT EPISODE, MODERATE DEGREE: ICD-10-CM

## 2023-11-13 DIAGNOSIS — Z12.31 ENCOUNTER FOR SCREENING MAMMOGRAM FOR MALIGNANT NEOPLASM OF BREAST: ICD-10-CM

## 2023-11-13 DIAGNOSIS — F33.1 MAJOR DEPRESSIVE DISORDER, RECURRENT EPISODE, MODERATE: ICD-10-CM

## 2023-11-13 DIAGNOSIS — F41.1 GENERALIZED ANXIETY DISORDER: Chronic | ICD-10-CM

## 2023-11-13 DIAGNOSIS — E78.2 MIXED HYPERLIPIDEMIA: Chronic | ICD-10-CM

## 2023-11-13 DIAGNOSIS — I47.10 SVT (SUPRAVENTRICULAR TACHYCARDIA): ICD-10-CM

## 2023-11-13 DIAGNOSIS — I10 ESSENTIAL HYPERTENSION: Primary | Chronic | ICD-10-CM

## 2023-11-13 LAB
AMPHET+METHAMPHET UR QL: NEGATIVE
AMPHETAMINES UR QL: NEGATIVE
BARBITURATES UR QL SCN: NEGATIVE
BENZODIAZ UR QL SCN: POSITIVE
BUPRENORPHINE SERPL-MCNC: NEGATIVE NG/ML
CANNABINOIDS SERPL QL: NEGATIVE
COCAINE UR QL: NEGATIVE
FENTANYL UR-MCNC: NEGATIVE NG/ML
METHADONE UR QL SCN: NEGATIVE
OPIATES UR QL: NEGATIVE
OXYCODONE UR QL SCN: NEGATIVE
PCP UR QL SCN: NEGATIVE
TRICYCLICS UR QL SCN: NEGATIVE

## 2023-11-13 PROCEDURE — 80307 DRUG TEST PRSMV CHEM ANLYZR: CPT | Performed by: INTERNAL MEDICINE

## 2023-11-13 RX ORDER — LISINOPRIL 10 MG/1
10 TABLET ORAL DAILY
Qty: 90 TABLET | Refills: 1 | Status: SHIPPED | OUTPATIENT
Start: 2023-11-13

## 2023-11-13 RX ORDER — ROSUVASTATIN CALCIUM 10 MG/1
10 TABLET, COATED ORAL DAILY
Qty: 90 TABLET | Refills: 1 | Status: SHIPPED | OUTPATIENT
Start: 2023-11-13

## 2023-11-13 RX ORDER — METOPROLOL SUCCINATE 50 MG/1
50 TABLET, EXTENDED RELEASE ORAL
Qty: 90 TABLET | Refills: 1 | Status: SHIPPED | OUTPATIENT
Start: 2023-11-13

## 2023-11-13 SDOH — SOCIAL STABILITY - SOCIAL INSECURITY: PROBLEMS IN RELATIONSHIP WITH SPOUSE OR PARTNER: Z63.0

## 2023-11-13 NOTE — PROGRESS NOTES
Patient Name: Nicole Cannon Today's Date: 2023   Patient MRN / CSN: [id number] / [id number] Date of Encounter: 2023   Patient Age / : 58 y.o. / 1965 Encounter Provider: Swathi Davis DO   Referring Physician: No ref. provider found          Nicole is a 58 y.o. female who is being seen today for Essential hypertension and Depression      Depression  Visit Type: follow-up  Patient presents with the following symptoms: depressed mood and nervousness/anxiety.  Patient is not experiencing: shortness of breath and suicidal ideas.   Severity: moderate (Nicole reports her anxiety and depression have improved with the current regimen.  She is also seeing Brian for counseling and reports that this is helped.  She reports tolerating Ativan and Lexapro well)   Compliance with medications:  %    Hypertension  This is a chronic problem. The current episode started more than 1 year ago. The problem is controlled (Patient reports typically checking her blood pressure at home but has not been checking it recently.  She is agreeable to restarting this.). Pertinent negatives include no chest pain or shortness of breath. Current antihypertension treatment includes ACE inhibitors and beta blockers. The current treatment provides significant improvement. There are no compliance problems.        Allergies include:Patient has no known allergies.  Current Outpatient Medications   Medication Sig Dispense Refill    Calcium Carbonate (CALCIUM 500 PO) Take  by mouth.      cetirizine (zyrTEC) 10 MG tablet Take 1 tablet by mouth Daily.      escitalopram (Lexapro) 20 MG tablet Take 1 tablet by mouth Daily. 90 tablet 3    lisinopril (PRINIVIL,ZESTRIL) 10 MG tablet Take 1 tablet by mouth Daily. 90 tablet 1    LORazepam (ATIVAN) 0.5 MG tablet Take 1 tablet by mouth 2 (Two) Times a Day As Needed for Anxiety. 60 tablet 2    metoprolol succinate XL (TOPROL-XL) 50 MG 24 hr tablet Take 1 tablet by mouth Daily. 90 tablet 1     multivitamin with minerals tablet tablet Take 1 tablet by mouth Daily.      pantoprazole (Protonix) 40 MG EC tablet Take 1 tablet by mouth Daily. 90 tablet 3    pseudoephedrine (SUDAFED) 30 MG tablet Take 1 tablet by mouth Every 4 (Four) Hours As Needed for Congestion.      rosuvastatin (CRESTOR) 10 MG tablet Take 1 tablet by mouth Daily. 90 tablet 1    valACYclovir (Valtrex) 1000 MG tablet Take 1 tablet by mouth 3 (Three) Times a Day As Needed (shingles outbreak). prn 90 tablet 1    VITAMIN D PO Take  by mouth.       No current facility-administered medications for this visit.     Past Medical History:   Diagnosis Date    Anxiety     Arrhythmia     CRPS (complex regional pain syndrome) type I of lower limb     Dysfunctional uterine bleeding     Dysmenorrhea     Foot fracture     Fracture     left shoulder, right knee    Hand pain     Hyperlipidemia     Itching     Low back pain     Lump of skin     Ganado Hunt syndrome (geniculate herpes zoster)     Seborrheic keratoses      Family History   Problem Relation Age of Onset    Diabetes Mother     Hyperlipidemia Mother     Hypertension Mother     Thyroid disease Mother     Hyperlipidemia Father     Cancer Father         PROSTATE     History reviewed. No pertinent surgical history.  Social History     Substance and Sexual Activity   Alcohol Use No     Social History     Tobacco Use   Smoking Status Never   Smokeless Tobacco Never     Social History     Substance and Sexual Activity   Drug Use No     Review of Systems   Constitutional:  Negative for fever.   Respiratory:  Negative for shortness of breath.    Cardiovascular:  Negative for chest pain.   Gastrointestinal:  Negative for abdominal pain and blood in stool.   Genitourinary:  Negative for hematuria.   Psychiatric/Behavioral:  Negative for suicidal ideas. The patient is nervous/anxious.         Depression Assessment Review:  PHQ-9 Total Score:    Vital Signs & Measurements Taken This Encounter  /77 (BP  "Location: Right arm, Patient Position: Sitting, Cuff Size: Adult)   Pulse 59   Temp 97.5 °F (36.4 °C) (Temporal)   Resp 18   Ht 157.5 cm (62.01\")   Wt 63.4 kg (139 lb 12.8 oz)   SpO2 97%   BMI 25.56 kg/m²    SpO2 Percentage    11/13/23 1504   SpO2: 97%               Physical Exam  Vitals reviewed.   Constitutional:       General: She is not in acute distress.  HENT:      Head: Normocephalic and atraumatic.   Eyes:      General: No scleral icterus.     Extraocular Movements: Extraocular movements intact.      Conjunctiva/sclera: Conjunctivae normal.      Pupils: Pupils are equal, round, and reactive to light.   Cardiovascular:      Rate and Rhythm: Regular rhythm. Bradycardia present.   Pulmonary:      Effort: Pulmonary effort is normal. No respiratory distress.      Breath sounds: Normal breath sounds.   Musculoskeletal:         General: No swelling.      Cervical back: Neck supple. No tenderness.   Lymphadenopathy:      Cervical: No cervical adenopathy.   Skin:     General: Skin is warm and dry.      Coloration: Skin is not jaundiced.   Neurological:      Mental Status: She is alert.   Psychiatric:         Mood and Affect: Mood normal.         Behavior: Behavior normal.         Thought Content: Thought content normal.         Judgment: Judgment normal.              Assessment & Plan  Patient Active Problem List   Diagnosis    Atopic rhinitis    Generalized anxiety disorder    Menopause present    SVT (supraventricular tachycardia)    Arthritis    Paroxysmal SVT (supraventricular tachycardia)    Essential hypertension    Mixed hyperlipidemia    Shingles    Stress incontinence of urine    Chronic fatigue    Health care maintenance    Prediabetes    Major depressive disorder, recurrent episode, moderate degree    Gastroesophageal reflux disease without esophagitis       ICD-10-CM ICD-9-CM   1. Essential hypertension  I10 401.9   2. Major depressive disorder, recurrent episode, moderate degree  F33.1 296.32   3. " Generalized anxiety disorder  F41.1 300.02   4. Medication monitoring encounter  Z51.81 V58.83   5. SVT (supraventricular tachycardia)  I47.10 427.89   6. Mixed hyperlipidemia  E78.2 272.2   7. Encounter for screening mammogram for malignant neoplasm of breast  Z12.31 V76.12     Orders Placed This Encounter   Procedures    Mammo Screening Digital Tomosynthesis Bilateral With CAD     Please schedule with [facility] in [place].     Standing Status:   Future     Standing Expiration Date:   11/13/2024     Scheduling Instructions:      Please schedule with [facility] in [place].     Order Specific Question:   Reason for Exam:     Answer:   screening for breast ca     Order Specific Question:   Release to patient     Answer:   Routine Release [[id number]]    Fluzone (or Fluarix & Flulaval for VFC) >6mos    Urine Drug Screen - Urine, Clean Catch     Order Specific Question:   Release to patient     Answer:   Routine Release [[id number]]       Meds Ordered During Visit:  New Medications Ordered This Visit   Medications    lisinopril (PRINIVIL,ZESTRIL) 10 MG tablet     Sig: Take 1 tablet by mouth Daily.     Dispense:  90 tablet     Refill:  1    metoprolol succinate XL (TOPROL-XL) 50 MG 24 hr tablet     Sig: Take 1 tablet by mouth Daily.     Dispense:  90 tablet     Refill:  1    rosuvastatin (CRESTOR) 10 MG tablet     Sig: Take 1 tablet by mouth Daily.     Dispense:  90 tablet     Refill:  1     We will continue current medicine regimen at this time.  I updated refills today as requested.  I reviewed PDMP.  We will also update UDS today.  Flu shot was updated as well.  We will plan to schedule mammogram soon.  I reviewed recent labs with patient today.    Return in about 3 months (around 2/13/2024), or if symptoms worsen or fail to improve, for Recheck.          Referring Provider (if known): No ref. provider found      This document has been electronically signed by Swathi Davis DO  November 13, 2023  16:21 EST    Swtahi Davis DO, FACOI  [address] [place] [address]  [phone] (office)    Part of this note may be an electronic transcription/translation of spoken language to printed text using the Dragon Dictation System.

## 2023-11-13 NOTE — PROGRESS NOTES
Immunization  Immunization performed in Right Deltoid by Yin Jordan MA. Patient tolerated the procedure well without complications.  11/13/23   Cassie cMkay MA

## 2023-11-14 NOTE — PROGRESS NOTES
"Date of Service: November 13, 2023  Time In: 10:15 AM  Time Out: 11:30 AM    IDENTIFYING  INFORMATION:   Nicole Cannon is a 58 y.o. female who met 1:1 with the undersigned for a regularly scheduled individual outpatient therapy session at [facility].    Chief complaint: Depression; anxiety; marital discord; low self-esteem    HPI: Patient reports ongoing significant marital discord and reports incident as of late that she believes her  who lives out of town through the week for his job is surveilling her through the [employer] in their home and states this past weekend she suddenly heard his voice saying \"be ashamed, be very ashamed\".  She also reports she began seeing the same message coming across the television screen and text.  Patient states this is something her  has said to her for many years and attempt to bring her into submission and to get what he wants.   Patient reports she believes she is doing somewhat better but continues to struggle with depression as evidenced by depressed mood, anhedonia, anergia, periodic crying spells, periods of feeling helpless and hopeless.  Long history of low self-esteem, and social isolation.  Patient also reports symptoms of anxiety including anxious mood, feeling on edge, feeling overwhelmed, increased heart rate, mind goes blank, and a sense of impending doom.  Patient rates current symptoms of depression at a 4 and anxiety at a 6 on a scale of 1-10.  Patient has difficulty writing feelings of low self-esteem; however, she states \"I have never felt like I deserved anything\".  Patient reports she continues to adhere to medication regimen as prescribed.  The patient adamantly convincingly denies suicidal ideation.  Patient vehemently denies any substance use.        Clinical Maneuvering/Intervention: This session was primarily focused on utilizing cognitive behavioral therapy to identify the patient's faulty, irrational thoughts which " have developed her core beliefs and facilitated her acceptance that she is not able to change her situation and began the process of assisting the patient with understanding that she can challenge and restructure her thought process.  Patient agreed to the homework of writing down on a piece of paper why she deserves to have a better life and to make decisions to bring about positive change.  Also utilized Socratic questioning and motivational reviewing techniques to attempt to assist the patient in recognizing her low self-esteem and the discrepancy with her on intellectual beliefs.  Further validated the patient's autonomy in her and right to choose how to proceed and how to deal with this situation but also encouraged her to be careful and assisted her in developing a safety plan in the event she did decide to leave the relationship.  Provided unconditional positive regard in a safe, supportive environment.    Allowed patient to freely discuss issues without interruption or judgment. Provided safe, confidential environment to facilitate the development and maintenance of positive therapeutic relationship. Assisted patient in identifying increased risk factors which would indicate the need for higher level of care including thoughts to harm self or others and/or self-harming behavior and encouraged patient to contact this office, call 911, or present to nearest emergency room should either event occur. Discussed crisis intervention services and means to access.          Assessment: Patient has a long history of depression and anxiety which appears to be primarily precipitated by strained relationship in her marriage for the past 40 years.  The patient symptomology continues to cause significant impairment in important areas of functioning.  As a result, the patient can be reasonably expected to benefit from ongoing treatment and would likely be at increased risk for decompensation otherwise.    DIAGNOSIS:    Assessment & Plan   Diagnoses and all orders for this visit:    1. Generalized anxiety disorder (Primary)    2. Major depressive disorder, recurrent episode, moderate    3. Marital/partner relational problem    4. Low self esteem               Mental Status Exam: Mental Status Exam:   Hygiene:   good  Cooperation:  Cooperative  Eye Contact:  Good  Psychomotor Behavior:  Appropriate  Affect:  Full range  Speech:  Normal  Thought Progress:  Linear  Thought Content:  Normal  Suicidal:  None  Homicidal:  None  Hallucinations:  None  Delusion:  None  Memory:  Intact  Orientation:  Person, Place, Time, and Situation  Reliability:  fair  Insight:  Fair  Judgement:  Fair  Impulse Control:  Fair        Patient's Support Network Includes: Immediate family     Progress toward goal: Not at goal     Functional Status: Moderate impairment      Prognosis: Guarded with Ongoing Treatment         Plan               Patient will continue in individual outpatient therapy session [facility] every 3 weeks and pharmacotherapy as scheduled.  Patient will adhere to medication regimen as prescribed and report any side effects. Patient will contact this office, call 911 or present to the nearest emergency room should suicidal or homicidal ideations occur. Provide Cognitive Behavioral Therapy and Integrative Therapy to improve functioning, maintain stability, and avoid decompensation and the need for higher level of care.              Return in about 4 weeks (around 12/11/2023) for Next scheduled follow up.        Brian Louie LCSW     This document signed by Brian Louie LCSW, [facility] November 14, 2023 06:56 EST

## 2023-12-02 DIAGNOSIS — F33.1 MAJOR DEPRESSIVE DISORDER, RECURRENT EPISODE, MODERATE DEGREE: ICD-10-CM

## 2023-12-04 RX ORDER — ESCITALOPRAM OXALATE 10 MG/1
10 TABLET ORAL DAILY
Qty: 90 TABLET | Refills: 3 | Status: SHIPPED | OUTPATIENT
Start: 2023-12-04

## 2023-12-14 ENCOUNTER — OFFICE VISIT (OUTPATIENT)
Dept: PSYCHIATRY | Facility: CLINIC | Age: 58
End: 2023-12-14
Payer: COMMERCIAL

## 2023-12-14 DIAGNOSIS — Z63.0 MARITAL/PARTNER RELATIONAL PROBLEM: ICD-10-CM

## 2023-12-14 DIAGNOSIS — R45.81 LOW SELF ESTEEM: ICD-10-CM

## 2023-12-14 DIAGNOSIS — F33.1 MAJOR DEPRESSIVE DISORDER, RECURRENT EPISODE, MODERATE: ICD-10-CM

## 2023-12-14 DIAGNOSIS — F41.1 GENERALIZED ANXIETY DISORDER: Primary | ICD-10-CM

## 2023-12-14 SDOH — SOCIAL STABILITY - SOCIAL INSECURITY: PROBLEMS IN RELATIONSHIP WITH SPOUSE OR PARTNER: Z63.0

## 2023-12-31 NOTE — PROGRESS NOTES
Date of Service: December 14, 2023  Time In: 11:40 AM  Time Out: 12:30 PM    IDENTIFYING  INFORMATION:   Nicole Cannon is a 58 y.o. female who met 1:1 with the undersigned for a regularly scheduled individual outpatient therapy session at [facility].    Chief complaint: Depression; anxiety; marital discord; low self-esteem    HPI: Patient reports things are relatively the same concerning her relationship with her .  She reports she did return from visiting her daughter out of state and reports her daughter's have largely adopted the strategy of avoiding her father as he is so difficult and abrasive.  Patient reports she continues to avoid her  if possible and states she has stopped going to stay with him in his apartment where he works out of town as much as possible.  Patient reports she continues to struggle with deciding to leave the marriage after more than 30 years and states on some level she simply has difficulty leaving but she also fears that backlash.  Patient reports she believes she is doing somewhat better but continues to struggle with depression as evidenced by depressed mood, anhedonia, anergia, periodic crying spells, periods of feeling helpless and hopeless.  Long history of low self-esteem, and social isolation.  Patient also reports symptoms of anxiety including anxious mood, feeling on edge, feeling overwhelmed, increased heart rate, mind goes blank, and a sense of impending doom.  Patient rates current symptoms of depression at a 5 and anxiety at a 6 on a scale of 1-10.   Patient reports she continues to adhere to medication regimen as prescribed.  The patient adamantly convincingly denies suicidal ideation.  Patient vehemently denies any substance use.        Clinical Maneuvering/Intervention: This session was primarily focused on assisting the patient with identifying her automatic cognitions and core beliefs that make it difficult for her to make  decisions that are best for her.  Also validated the patient's fear that her  could become aggressive or even violent and encouraged her to plan accordingly if and when she does decide to leave the relationship.  Also assisted the patient in identifying her automatic thoughts that she must allow her  to say what ever he would like to her if she does decide to leave and that she must explain herself to him and suggested this is simply not true and she has a right to do what is right for her and does not have to explain herself to anyone.  In fact, the undersigned encouraged the patient to write this down on a piece of paper and put it somewhere so she could see it on a daily basis.  Further validated the patient's autonomy in her and right to choose how to proceed and how to deal with this situation but also encouraged her to be careful and assisted her in developing a safety plan in the event she did decide to leave the relationship.  Provided unconditional positive regard in a safe, supportive environment.    Allowed patient to freely discuss issues without interruption or judgment. Provided safe, confidential environment to facilitate the development and maintenance of positive therapeutic relationship. Assisted patient in identifying increased risk factors which would indicate the need for higher level of care including thoughts to harm self or others and/or self-harming behavior and encouraged patient to contact this office, call 911, or present to nearest emergency room should either event occur. Discussed crisis intervention services and means to access.          Assessment: Patient has a long history of depression and anxiety which appears to be primarily precipitated by strained relationship in her marriage for the past 40 years.  The patient symptomology continues to cause significant impairment in important areas of functioning.  As a result, the patient can be reasonably expected to benefit  from ongoing treatment and would likely be at increased risk for decompensation otherwise.    DIAGNOSIS:   Assessment & Plan   Diagnoses and all orders for this visit:    1. Generalized anxiety disorder (Primary)    2. Major depressive disorder, recurrent episode, moderate    3. Marital/partner relational problem    4. Low self esteem               Mental Status Exam: Mental Status Exam:   Hygiene:   good  Cooperation:  Cooperative  Eye Contact:  Good  Psychomotor Behavior:  Appropriate  Affect:  Full range  Speech:  Normal  Thought Progress:  Linear  Thought Content:  Normal  Suicidal:  None  Homicidal:  None  Hallucinations:  None  Delusion:  None  Memory:  Intact  Orientation:  Person, Place, Time, and Situation  Reliability:  fair  Insight:  Fair  Judgement:  Fair  Impulse Control:  Fair        Patient's Support Network Includes: Immediate family     Progress toward goal: Not at goal     Functional Status: Moderate impairment      Prognosis: Guarded with Ongoing Treatment         Plan               Patient will continue in individual outpatient therapy session [facility] every 3 weeks and pharmacotherapy as scheduled.  Patient will adhere to medication regimen as prescribed and report any side effects. Patient will contact this office, call 911 or present to the nearest emergency room should suicidal or homicidal ideations occur. Provide Cognitive Behavioral Therapy and Integrative Therapy to improve functioning, maintain stability, and avoid decompensation and the need for higher level of care.              Return in about 4 weeks (around 1/11/2024) for Next scheduled follow up.        Brian Louie LCSW     This document signed by Brian Louie LCSW, [facility] December 31, 2023 10:21 EST

## 2024-01-08 DIAGNOSIS — F41.1 GENERALIZED ANXIETY DISORDER: Chronic | ICD-10-CM

## 2024-01-08 RX ORDER — LORAZEPAM 0.5 MG/1
0.5 TABLET ORAL 2 TIMES DAILY PRN
Qty: 60 TABLET | Refills: 2 | Status: SHIPPED | OUTPATIENT
Start: 2024-01-08

## 2024-01-08 NOTE — TELEPHONE ENCOUNTER
Caller: Nicole Cannon    Relationship: Self    Best call back number: [phone]    Requested Prescriptions:   Requested Prescriptions     Pending Prescriptions Disp Refills    LORazepam (ATIVAN) 0.5 MG tablet 60 tablet 2     Sig: Take 1 tablet by mouth 2 (Two) Times a Day As Needed for Anxiety.        Pharmacy where request should be sent:    [facility] [phone]  Last office visit with prescribing clinician: 11/13/2023   Last telemedicine visit with prescribing clinician: Visit date not found   Next office visit with prescribing clinician: 2/13/2024     Additional details provided by patient: PATIENT HAS 3-4 DAYS LEFT OF MEDICATION    Does the patient have less than a 3 day supply:  [] Yes  [x] No    Would you like a call back once the refill request has been completed: [x] Yes [] No    If the office needs to give you a call back, can they leave a voicemail: [x] Yes [] No    June Alvarez Rep   01/08/24 09:35 EST

## 2024-01-15 ENCOUNTER — TELEMEDICINE (OUTPATIENT)
Dept: PSYCHIATRY | Facility: CLINIC | Age: 59
End: 2024-01-15
Payer: COMMERCIAL

## 2024-01-15 DIAGNOSIS — F43.21 GRIEF REACTION: ICD-10-CM

## 2024-01-15 DIAGNOSIS — F41.1 GENERALIZED ANXIETY DISORDER: Primary | ICD-10-CM

## 2024-01-15 DIAGNOSIS — F33.1 MAJOR DEPRESSIVE DISORDER, RECURRENT EPISODE, MODERATE: ICD-10-CM

## 2024-01-29 ENCOUNTER — OFFICE VISIT (OUTPATIENT)
Dept: PSYCHIATRY | Facility: CLINIC | Age: 59
End: 2024-01-29
Payer: COMMERCIAL

## 2024-01-29 DIAGNOSIS — F41.1 GENERALIZED ANXIETY DISORDER: Primary | ICD-10-CM

## 2024-01-29 DIAGNOSIS — F33.1 MAJOR DEPRESSIVE DISORDER, RECURRENT EPISODE, MODERATE: ICD-10-CM

## 2024-01-29 DIAGNOSIS — F43.21 GRIEF REACTION: ICD-10-CM

## 2024-02-12 ENCOUNTER — TELEMEDICINE (OUTPATIENT)
Dept: PSYCHIATRY | Facility: CLINIC | Age: 59
End: 2024-02-12
Payer: COMMERCIAL

## 2024-02-12 DIAGNOSIS — F41.1 GENERALIZED ANXIETY DISORDER: Primary | ICD-10-CM

## 2024-02-12 DIAGNOSIS — F33.1 MAJOR DEPRESSIVE DISORDER, RECURRENT EPISODE, MODERATE: ICD-10-CM

## 2024-02-12 DIAGNOSIS — F43.21 GRIEF REACTION: ICD-10-CM

## 2024-02-22 ENCOUNTER — OFFICE VISIT (OUTPATIENT)
Dept: FAMILY MEDICINE CLINIC | Facility: CLINIC | Age: 59
End: 2024-02-22
Payer: COMMERCIAL

## 2024-02-22 VITALS
BODY MASS INDEX: 25.8 KG/M2 | WEIGHT: 140.2 LBS | OXYGEN SATURATION: 94 % | HEART RATE: 54 BPM | SYSTOLIC BLOOD PRESSURE: 120 MMHG | HEIGHT: 62 IN | TEMPERATURE: 97.7 F | DIASTOLIC BLOOD PRESSURE: 84 MMHG

## 2024-02-22 DIAGNOSIS — E78.2 MIXED HYPERLIPIDEMIA: Chronic | ICD-10-CM

## 2024-02-22 DIAGNOSIS — I47.10 SVT (SUPRAVENTRICULAR TACHYCARDIA): ICD-10-CM

## 2024-02-22 DIAGNOSIS — I10 ESSENTIAL HYPERTENSION: Chronic | ICD-10-CM

## 2024-02-22 DIAGNOSIS — B02.8 HERPES ZOSTER WITH OTHER COMPLICATION: Chronic | ICD-10-CM

## 2024-02-22 DIAGNOSIS — F33.1 MAJOR DEPRESSIVE DISORDER, RECURRENT EPISODE, MODERATE DEGREE: ICD-10-CM

## 2024-02-22 DIAGNOSIS — F41.1 GENERALIZED ANXIETY DISORDER: Chronic | ICD-10-CM

## 2024-02-22 PROCEDURE — 99214 OFFICE O/P EST MOD 30 MIN: CPT | Performed by: INTERNAL MEDICINE

## 2024-02-22 RX ORDER — METOPROLOL SUCCINATE 50 MG/1
50 TABLET, EXTENDED RELEASE ORAL
Qty: 90 TABLET | Refills: 1 | Status: SHIPPED | OUTPATIENT
Start: 2024-02-22

## 2024-02-22 RX ORDER — ESCITALOPRAM OXALATE 20 MG/1
20 TABLET ORAL DAILY
Qty: 90 TABLET | Refills: 1 | Status: SHIPPED | OUTPATIENT
Start: 2024-02-22

## 2024-02-22 RX ORDER — LISINOPRIL 10 MG/1
10 TABLET ORAL DAILY
Qty: 90 TABLET | Refills: 1 | Status: SHIPPED | OUTPATIENT
Start: 2024-02-22

## 2024-02-22 RX ORDER — LORAZEPAM 0.5 MG/1
0.5 TABLET ORAL EVERY 8 HOURS PRN
Qty: 90 TABLET | Refills: 2 | Status: SHIPPED | OUTPATIENT
Start: 2024-02-22

## 2024-02-22 RX ORDER — ALPRAZOLAM 1 MG/1
1 TABLET ORAL 3 TIMES DAILY
Status: CANCELLED | OUTPATIENT
Start: 2024-02-22

## 2024-02-22 RX ORDER — VALACYCLOVIR HYDROCHLORIDE 1 G/1
1000 TABLET, FILM COATED ORAL 3 TIMES DAILY PRN
Qty: 90 TABLET | Refills: 1 | Status: SHIPPED | OUTPATIENT
Start: 2024-02-22

## 2024-02-22 RX ORDER — ROSUVASTATIN CALCIUM 10 MG/1
10 TABLET, COATED ORAL DAILY
Qty: 90 TABLET | Refills: 1 | Status: SHIPPED | OUTPATIENT
Start: 2024-02-22

## 2024-02-22 RX ORDER — ALPRAZOLAM 1 MG/1
1 TABLET ORAL 3 TIMES DAILY
COMMUNITY
Start: 2023-12-23 | End: 2024-02-22 | Stop reason: ALTCHOICE

## 2024-02-22 NOTE — PROGRESS NOTES
Patient Name: Nicole Cannon Today's Date: 2024   Patient MRN / CSN: [id number] / [id number] Date of Encounter: 2024   Patient Age / : 58 y.o. / 1965 Encounter Provider: Swathi Davis DO   Referring Physician: No ref. provider found          Nicole is a 58 y.o. female who is being seen today for Hypertension and Anxiety      History of Present Illness    Nicole presents today for follow-up on hypertension, anxiety, MDD, history of SVT, and history of recurrent shingles.  Since last visit, her  was killed in a car accident.  She reports the anxiety was worse after the accident and she was prescribed Xanax to take 3 times daily as needed.  She has been taking Ativan 0.5 mg in the morning and midday.  She started taking Xanax 0.25 mg at night to help her sleep.  She is also been taking Lexapro and feels that she is doing well on the current dose of it.  Overall, she feels that her moods are stable.  She denies suicidal ideation.  She reports managing the estate well, with some days being harder than others.  Her blood pressure is well-controlled.  She has had an increase in numbness, tingling, and pain at the previous shingles site but reports that she has not had a rash breakout.  She attributes these symptoms to the increased anxiety and stress recently.    Allergies include:Patient has no known allergies.  Current Outpatient Medications   Medication Sig Dispense Refill    Calcium Carbonate (CALCIUM 500 PO) Take  by mouth.      cetirizine (zyrTEC) 10 MG tablet Take 1 tablet by mouth Daily.      escitalopram (LEXAPRO) 20 MG tablet Take 1 tablet by mouth Daily. 90 tablet 1    lisinopril (PRINIVIL,ZESTRIL) 10 MG tablet Take 1 tablet by mouth Daily. 90 tablet 1    LORazepam (ATIVAN) 0.5 MG tablet Take 1 tablet by mouth Every 8 (Eight) Hours As Needed for Anxiety. 90 tablet 2    metoprolol succinate XL (TOPROL-XL) 50 MG 24 hr tablet Take 1 tablet by mouth Daily. 90 tablet 1    multivitamin with  "minerals tablet tablet Take 1 tablet by mouth Daily.      pantoprazole (Protonix) 40 MG EC tablet Take 1 tablet by mouth Daily. 90 tablet 3    rosuvastatin (CRESTOR) 10 MG tablet Take 1 tablet by mouth Daily. 90 tablet 1    valACYclovir (Valtrex) 1000 MG tablet Take 1 tablet by mouth 3 (Three) Times a Day As Needed (shingles outbreak). prn 90 tablet 1    VITAMIN D PO Take  by mouth.       No current facility-administered medications for this visit.     Past Medical History:   Diagnosis Date    Anxiety     Arrhythmia     CRPS (complex regional pain syndrome) type I of lower limb     Dysfunctional uterine bleeding     Dysmenorrhea     Foot fracture     Fracture     left shoulder, right knee    Hand pain     Hyperlipidemia     Itching     Low back pain     Lump of skin     Basia Hunt syndrome (geniculate herpes zoster)     Seborrheic keratoses      Family History   Problem Relation Age of Onset    Diabetes Mother     Hyperlipidemia Mother     Hypertension Mother     Thyroid disease Mother     Hyperlipidemia Father     Cancer Father         PROSTATE     History reviewed. No pertinent surgical history.  Social History     Substance and Sexual Activity   Alcohol Use No     Social History     Tobacco Use   Smoking Status Never   Smokeless Tobacco Never     Social History     Substance and Sexual Activity   Drug Use No     Review of Systems   Constitutional:  Positive for fatigue.   Cardiovascular:  Negative for chest pain.   Gastrointestinal:  Negative for abdominal pain.   Psychiatric/Behavioral:  Positive for sleep disturbance. Negative for suicidal ideas. The patient is nervous/anxious.         Depression Assessment Review:  PHQ-9 Total Score: 0  Vital Signs & Measurements Taken This Encounter  /84 (BP Location: Left arm, Patient Position: Sitting, Cuff Size: Adult)   Pulse 54   Temp 97.7 °F (36.5 °C) (Temporal)   Ht 157.5 cm (62.01\")   Wt 63.6 kg (140 lb 3.2 oz)   SpO2 94%   BMI 25.64 kg/m²    SpO2 " Percentage    02/22/24 1035   SpO2: 94%               Physical Exam  Vitals reviewed.   Constitutional:       General: She is not in acute distress.  HENT:      Head: Normocephalic and atraumatic.   Eyes:      General: No scleral icterus.     Extraocular Movements: Extraocular movements intact.      Conjunctiva/sclera: Conjunctivae normal.      Pupils: Pupils are equal, round, and reactive to light.   Cardiovascular:      Rate and Rhythm: Regular rhythm. Bradycardia present.   Pulmonary:      Effort: Pulmonary effort is normal. No respiratory distress.      Breath sounds: Normal breath sounds.   Musculoskeletal:         General: No swelling.      Cervical back: Neck supple. No tenderness.   Lymphadenopathy:      Cervical: No cervical adenopathy.   Skin:     General: Skin is warm and dry.      Coloration: Skin is not jaundiced.      Findings: No rash.   Neurological:      Mental Status: She is alert.   Psychiatric:         Mood and Affect: Mood normal.         Behavior: Behavior normal.         Thought Content: Thought content normal.         Judgment: Judgment normal.      Comments: Nicole makes appropriate eye contact, is smiling, cooperative, and not tearful today              Assessment & Plan  Patient Active Problem List   Diagnosis    Atopic rhinitis    Generalized anxiety disorder    Menopause present    SVT (supraventricular tachycardia)    Arthritis    Paroxysmal SVT (supraventricular tachycardia)    Essential hypertension    Mixed hyperlipidemia    Shingles    Stress incontinence of urine    Chronic fatigue    Health care maintenance    Prediabetes    Major depressive disorder, recurrent episode, moderate degree    Gastroesophageal reflux disease without esophagitis       ICD-10-CM ICD-9-CM   1. Essential hypertension  I10 401.9   2. Mixed hyperlipidemia  E78.2 272.2   3. Herpes zoster with other complication  B02.8 053.79   4. SVT (supraventricular tachycardia)  I47.10 427.89   5. Major depressive disorder,  recurrent episode, moderate degree  F33.1 296.32   6. Generalized anxiety disorder  F41.1 300.02     No orders of the defined types were placed in this encounter.      Meds Ordered During Visit:  New Medications Ordered This Visit   Medications    valACYclovir (Valtrex) 1000 MG tablet     Sig: Take 1 tablet by mouth 3 (Three) Times a Day As Needed (shingles outbreak). prn     Dispense:  90 tablet     Refill:  1    metoprolol succinate XL (TOPROL-XL) 50 MG 24 hr tablet     Sig: Take 1 tablet by mouth Daily.     Dispense:  90 tablet     Refill:  1    lisinopril (PRINIVIL,ZESTRIL) 10 MG tablet     Sig: Take 1 tablet by mouth Daily.     Dispense:  90 tablet     Refill:  1    rosuvastatin (CRESTOR) 10 MG tablet     Sig: Take 1 tablet by mouth Daily.     Dispense:  90 tablet     Refill:  1    escitalopram (LEXAPRO) 20 MG tablet     Sig: Take 1 tablet by mouth Daily.     Dispense:  90 tablet     Refill:  1     Please disregard any previous lexapro rxs. Patient takes 20mg daily.    LORazepam (ATIVAN) 0.5 MG tablet     Sig: Take 1 tablet by mouth Every 8 (Eight) Hours As Needed for Anxiety.     Dispense:  90 tablet     Refill:  2     I reviewed PDMP today.  UDS is up-to-date.  I explained that Ativan and Xanax are in the same class and I do not recommend taking both medications.  We agreed to continue with Ativan and stop Xanax.  I will increase Ativan to 3 times daily instead of twice daily.  We will continue other medicines as previously prescribed at this time.    Return in about 1 month (around 3/22/2024), or if symptoms worsen or fail to improve, for Recheck.          Referring Provider (if known): No ref. provider found      This document has been electronically signed by Swathi Davis DO  February 22, 2024 13:15 EST    Swathi Davis DO, FACOI  [address] [place] [address]  [phone] (office)    Part of this note may be an electronic transcription/translation of spoken language to printed text  using the Dragon Dictation System.

## 2024-02-23 ENCOUNTER — PATIENT ROUNDING (BHMG ONLY) (OUTPATIENT)
Dept: FAMILY MEDICINE CLINIC | Facility: CLINIC | Age: 59
End: 2024-02-23
Payer: COMMERCIAL

## 2024-02-26 ENCOUNTER — TELEPHONE (OUTPATIENT)
Dept: FAMILY MEDICINE CLINIC | Facility: CLINIC | Age: 59
End: 2024-02-26
Payer: COMMERCIAL

## 2024-02-26 ENCOUNTER — OFFICE VISIT (OUTPATIENT)
Dept: PSYCHIATRY | Facility: CLINIC | Age: 59
End: 2024-02-26
Payer: COMMERCIAL

## 2024-02-26 DIAGNOSIS — F51.5 NIGHTMARES: ICD-10-CM

## 2024-02-26 DIAGNOSIS — F43.10 POST TRAUMATIC STRESS DISORDER (PTSD): Primary | ICD-10-CM

## 2024-02-26 DIAGNOSIS — F51.5 NIGHTMARES: Primary | ICD-10-CM

## 2024-02-26 DIAGNOSIS — F41.1 GENERALIZED ANXIETY DISORDER: ICD-10-CM

## 2024-02-26 DIAGNOSIS — F43.10 PTSD (POST-TRAUMATIC STRESS DISORDER): ICD-10-CM

## 2024-02-26 DIAGNOSIS — F33.1 MAJOR DEPRESSIVE DISORDER, RECURRENT EPISODE, MODERATE: ICD-10-CM

## 2024-02-26 RX ORDER — PRAZOSIN HYDROCHLORIDE 1 MG/1
1 CAPSULE ORAL NIGHTLY
Qty: 30 CAPSULE | Refills: 5 | Status: SHIPPED | OUTPATIENT
Start: 2024-02-26

## 2024-02-26 NOTE — TELEPHONE ENCOUNTER
Nicole saw her counselor this morning and he reached out to me regarding her nightmares since the traumatic loss of her  recently. He recommended she try low dose prazosin. I will send this in to take at night. Please let patient know. We will plan to follow up next month as previously scheduled, or sooner if needed.

## 2024-02-26 NOTE — TELEPHONE ENCOUNTER
Called pt at [phone], reviewed this information with patient and then the phone call was dropped. Tried again and call went to voicemail. Will try again later
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
## 2024-03-19 ENCOUNTER — TELEMEDICINE (OUTPATIENT)
Dept: FAMILY MEDICINE CLINIC | Facility: CLINIC | Age: 59
End: 2024-03-19
Payer: COMMERCIAL

## 2024-03-19 DIAGNOSIS — F33.1 MAJOR DEPRESSIVE DISORDER, RECURRENT EPISODE, MODERATE DEGREE: ICD-10-CM

## 2024-03-19 DIAGNOSIS — F41.1 GENERALIZED ANXIETY DISORDER: Primary | Chronic | ICD-10-CM

## 2024-03-19 PROCEDURE — 99214 OFFICE O/P EST MOD 30 MIN: CPT | Performed by: INTERNAL MEDICINE
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
## 2024-04-22 ENCOUNTER — OFFICE VISIT (OUTPATIENT)
Dept: PSYCHIATRY | Facility: CLINIC | Age: 59
End: 2024-04-22
Payer: COMMERCIAL

## 2024-04-22 DIAGNOSIS — F51.5 NIGHTMARES: ICD-10-CM

## 2024-04-22 DIAGNOSIS — F33.1 MAJOR DEPRESSIVE DISORDER, RECURRENT EPISODE, MODERATE: ICD-10-CM

## 2024-04-22 DIAGNOSIS — F43.10 POST TRAUMATIC STRESS DISORDER (PTSD): Primary | ICD-10-CM

## 2024-04-22 DIAGNOSIS — F41.1 GENERALIZED ANXIETY DISORDER: ICD-10-CM

## 2024-04-22 DIAGNOSIS — F43.21 GRIEF REACTION: ICD-10-CM

## 2024-04-22 PROCEDURE — 90837 PSYTX W PT 60 MINUTES: CPT | Performed by: SOCIAL WORKER
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
## 2024-05-20 ENCOUNTER — OFFICE VISIT (OUTPATIENT)
Dept: PSYCHIATRY | Facility: CLINIC | Age: 59
End: 2024-05-20
Payer: COMMERCIAL

## 2024-05-20 DIAGNOSIS — F43.21 GRIEF REACTION: ICD-10-CM

## 2024-05-20 DIAGNOSIS — F43.10 POST TRAUMATIC STRESS DISORDER (PTSD): Primary | ICD-10-CM

## 2024-05-20 DIAGNOSIS — F51.5 NIGHTMARES: ICD-10-CM

## 2024-05-20 DIAGNOSIS — F41.1 GENERALIZED ANXIETY DISORDER: ICD-10-CM

## 2024-05-20 DIAGNOSIS — F33.1 MAJOR DEPRESSIVE DISORDER, RECURRENT EPISODE, MODERATE: ICD-10-CM

## 2024-06-03 DIAGNOSIS — F41.1 GENERALIZED ANXIETY DISORDER: Chronic | ICD-10-CM

## 2024-06-03 RX ORDER — LORAZEPAM 0.5 MG/1
0.5 TABLET ORAL EVERY 8 HOURS PRN
Qty: 90 TABLET | Refills: 2 | Status: CANCELLED | OUTPATIENT
Start: 2024-06-03

## 2024-06-03 NOTE — TELEPHONE ENCOUNTER
Caller: Davis Nicole YANIRA    Relationship: Self    Best call back number: [phone]    Requested Prescriptions:   Requested Prescriptions     Pending Prescriptions Disp Refills    LORazepam (ATIVAN) 0.5 MG tablet 90 tablet 2     Sig: Take 1 tablet by mouth Every 8 (Eight) Hours As Needed for Anxiety.        Pharmacy where request should be sent: [id number] - [place], [place] - [address] AT [address] [phone] [facility] [phone]      Last office visit with prescribing clinician: 2/22/2024   Last telemedicine visit with prescribing clinician: 3/19/2024   Next office visit with prescribing clinician: 6/27/2024     Additional details provided by patient: PT HAS ENOUGH FOR TODAY BUT NEEDS REFILLS BEFORE WED SHE IS LEAVING OUT OF TOWN THEN    Does the patient have less than a 3 day supply:  [x] Yes  [] No    Would you like a call back once the refill request has been completed: [x] Yes [] No    If the office needs to give you a call back, can they leave a voicemail: [x] Yes [] No    June Damon Rep   06/03/24 12:39 EDT

## 2024-06-05 DIAGNOSIS — F41.1 GENERALIZED ANXIETY DISORDER: Chronic | ICD-10-CM

## 2024-06-05 NOTE — TELEPHONE ENCOUNTER
Patient called in on Monday for refill but is now completely out of medication and Dr. Davis is out of office until next week. The patient is unable to come into the office this afternoon to see TANMAY Argueta for refill and leaves to go out of town on Friday for 2 weeks. Made pt an appt for tomorrow morning with TANMAY eDan to discuss medication refill. Discussed situation with  as well, as I will be out of office tomorrow.

## 2024-06-06 ENCOUNTER — OFFICE VISIT (OUTPATIENT)
Dept: FAMILY MEDICINE CLINIC | Facility: CLINIC | Age: 59
End: 2024-06-06
Payer: COMMERCIAL

## 2024-06-06 VITALS
HEIGHT: 62 IN | OXYGEN SATURATION: 96 % | BODY MASS INDEX: 26.31 KG/M2 | HEART RATE: 71 BPM | DIASTOLIC BLOOD PRESSURE: 82 MMHG | TEMPERATURE: 97.9 F | SYSTOLIC BLOOD PRESSURE: 120 MMHG | WEIGHT: 143 LBS

## 2024-06-06 DIAGNOSIS — F41.1 GENERALIZED ANXIETY DISORDER: Chronic | ICD-10-CM

## 2024-06-06 PROCEDURE — 99213 OFFICE O/P EST LOW 20 MIN: CPT | Performed by: NURSE PRACTITIONER

## 2024-06-06 RX ORDER — LORAZEPAM 0.5 MG/1
0.5 TABLET ORAL EVERY 8 HOURS PRN
Qty: 90 TABLET | OUTPATIENT
Start: 2024-06-06

## 2024-06-06 RX ORDER — LORAZEPAM 0.5 MG/1
0.5 TABLET ORAL EVERY 8 HOURS PRN
Qty: 90 TABLET | Refills: 0 | Status: SHIPPED | OUTPATIENT
Start: 2024-06-06
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
## 2024-06-27 ENCOUNTER — OFFICE VISIT (OUTPATIENT)
Dept: FAMILY MEDICINE CLINIC | Facility: CLINIC | Age: 59
End: 2024-06-27
Payer: COMMERCIAL

## 2024-06-27 VITALS
WEIGHT: 146.6 LBS | TEMPERATURE: 99.1 F | HEART RATE: 73 BPM | HEIGHT: 62 IN | BODY MASS INDEX: 26.98 KG/M2 | DIASTOLIC BLOOD PRESSURE: 82 MMHG | SYSTOLIC BLOOD PRESSURE: 130 MMHG | OXYGEN SATURATION: 98 %

## 2024-06-27 DIAGNOSIS — F41.1 GENERALIZED ANXIETY DISORDER: Primary | Chronic | ICD-10-CM

## 2024-06-27 DIAGNOSIS — M25.561 CHRONIC PAIN OF BOTH KNEES: ICD-10-CM

## 2024-06-27 DIAGNOSIS — M25.562 CHRONIC PAIN OF BOTH KNEES: ICD-10-CM

## 2024-06-27 DIAGNOSIS — G89.29 CHRONIC PAIN OF BOTH KNEES: ICD-10-CM

## 2024-06-27 DIAGNOSIS — M25.572 CHRONIC PAIN OF BOTH ANKLES: ICD-10-CM

## 2024-06-27 DIAGNOSIS — M25.571 CHRONIC PAIN OF BOTH ANKLES: ICD-10-CM

## 2024-06-27 DIAGNOSIS — G89.29 CHRONIC PAIN OF BOTH ANKLES: ICD-10-CM

## 2024-06-27 PROCEDURE — 99214 OFFICE O/P EST MOD 30 MIN: CPT | Performed by: INTERNAL MEDICINE

## 2024-06-27 RX ORDER — LORAZEPAM 1 MG/1
1 TABLET ORAL EVERY 8 HOURS PRN
Qty: 90 TABLET | Refills: 0 | Status: SHIPPED | OUTPATIENT
Start: 2024-06-27
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
## 2024-07-03 ENCOUNTER — HOSPITAL ENCOUNTER (OUTPATIENT)
Dept: GENERAL RADIOLOGY | Facility: HOSPITAL | Age: 59
Discharge: HOME | End: 2024-07-03
Admitting: INTERNAL MEDICINE
Payer: COMMERCIAL

## 2024-07-03 DIAGNOSIS — M25.561 CHRONIC PAIN OF BOTH KNEES: ICD-10-CM

## 2024-07-03 DIAGNOSIS — G89.29 CHRONIC PAIN OF BOTH KNEES: ICD-10-CM

## 2024-07-03 DIAGNOSIS — M25.562 CHRONIC PAIN OF BOTH KNEES: ICD-10-CM

## 2024-07-03 DIAGNOSIS — G89.29 CHRONIC PAIN OF BOTH ANKLES: ICD-10-CM

## 2024-07-03 DIAGNOSIS — M25.571 CHRONIC PAIN OF BOTH ANKLES: ICD-10-CM

## 2024-07-03 DIAGNOSIS — M25.572 CHRONIC PAIN OF BOTH ANKLES: ICD-10-CM

## 2024-07-03 PROCEDURE — 73610 X-RAY EXAM OF ANKLE: CPT

## 2024-07-03 PROCEDURE — 73562 X-RAY EXAM OF KNEE 3: CPT

## 2024-07-08 ENCOUNTER — OFFICE VISIT (OUTPATIENT)
Dept: PSYCHIATRY | Facility: CLINIC | Age: 59
End: 2024-07-08
Payer: COMMERCIAL

## 2024-07-08 DIAGNOSIS — F43.10 POST TRAUMATIC STRESS DISORDER (PTSD): Primary | ICD-10-CM

## 2024-07-08 DIAGNOSIS — F51.5 NIGHTMARES: ICD-10-CM

## 2024-07-08 PROCEDURE — 90834 PSYTX W PT 45 MINUTES: CPT | Performed by: SOCIAL WORKER
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
## 2024-07-25 ENCOUNTER — TELEMEDICINE (OUTPATIENT)
Dept: FAMILY MEDICINE CLINIC | Facility: CLINIC | Age: 59
End: 2024-07-25
Payer: COMMERCIAL

## 2024-07-25 DIAGNOSIS — F41.1 GENERALIZED ANXIETY DISORDER: Primary | Chronic | ICD-10-CM

## 2024-07-25 DIAGNOSIS — I47.10 SVT (SUPRAVENTRICULAR TACHYCARDIA): ICD-10-CM

## 2024-07-25 DIAGNOSIS — B02.8 HERPES ZOSTER WITH OTHER COMPLICATION: Chronic | ICD-10-CM

## 2024-07-25 DIAGNOSIS — F33.1 MAJOR DEPRESSIVE DISORDER, RECURRENT EPISODE, MODERATE DEGREE: ICD-10-CM

## 2024-07-25 DIAGNOSIS — E78.2 MIXED HYPERLIPIDEMIA: Chronic | ICD-10-CM

## 2024-07-25 DIAGNOSIS — K21.9 GASTROESOPHAGEAL REFLUX DISEASE WITHOUT ESOPHAGITIS: ICD-10-CM

## 2024-07-25 DIAGNOSIS — I10 ESSENTIAL HYPERTENSION: Chronic | ICD-10-CM

## 2024-07-25 PROCEDURE — 99214 OFFICE O/P EST MOD 30 MIN: CPT | Performed by: INTERNAL MEDICINE

## 2024-07-25 RX ORDER — VALACYCLOVIR HYDROCHLORIDE 1 G/1
1000 TABLET, FILM COATED ORAL 3 TIMES DAILY PRN
Qty: 90 TABLET | Refills: 1 | Status: SHIPPED | OUTPATIENT
Start: 2024-07-25

## 2024-07-25 RX ORDER — PANTOPRAZOLE SODIUM 40 MG/1
40 TABLET, DELAYED RELEASE ORAL DAILY
Qty: 90 TABLET | Refills: 1 | Status: SHIPPED | OUTPATIENT
Start: 2024-07-25

## 2024-07-25 RX ORDER — LORAZEPAM 1 MG/1
1 TABLET ORAL EVERY 8 HOURS PRN
Qty: 90 TABLET | Refills: 2 | Status: SHIPPED | OUTPATIENT
Start: 2024-07-25

## 2024-07-25 RX ORDER — ESCITALOPRAM OXALATE 20 MG/1
20 TABLET ORAL DAILY
Qty: 90 TABLET | Refills: 1 | Status: SHIPPED | OUTPATIENT
Start: 2024-07-25

## 2024-07-25 RX ORDER — ROSUVASTATIN CALCIUM 10 MG/1
10 TABLET, COATED ORAL DAILY
Qty: 90 TABLET | Refills: 1 | Status: SHIPPED | OUTPATIENT
Start: 2024-07-25

## 2024-07-25 RX ORDER — METOPROLOL SUCCINATE 50 MG/1
50 TABLET, EXTENDED RELEASE ORAL
Qty: 90 TABLET | Refills: 1 | Status: SHIPPED | OUTPATIENT
Start: 2024-07-25

## 2024-07-25 RX ORDER — LISINOPRIL 10 MG/1
10 TABLET ORAL DAILY
Qty: 90 TABLET | Refills: 1 | Status: SHIPPED | OUTPATIENT
Start: 2024-07-25
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
## 2024-08-05 ENCOUNTER — OFFICE VISIT (OUTPATIENT)
Dept: PSYCHIATRY | Facility: CLINIC | Age: 59
End: 2024-08-05
Payer: COMMERCIAL

## 2024-08-05 DIAGNOSIS — F51.5 NIGHTMARES: ICD-10-CM

## 2024-08-05 DIAGNOSIS — F43.10 POST TRAUMATIC STRESS DISORDER (PTSD): Primary | ICD-10-CM

## 2024-08-05 PROCEDURE — 90837 PSYTX W PT 60 MINUTES: CPT | Performed by: SOCIAL WORKER
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
## 2024-09-23 ENCOUNTER — OFFICE VISIT (OUTPATIENT)
Dept: PSYCHIATRY | Facility: CLINIC | Age: 59
End: 2024-09-23
Payer: COMMERCIAL

## 2024-09-23 DIAGNOSIS — F33.1 MAJOR DEPRESSIVE DISORDER, RECURRENT EPISODE, MODERATE: ICD-10-CM

## 2024-09-23 DIAGNOSIS — F51.5 NIGHTMARES: ICD-10-CM

## 2024-09-23 DIAGNOSIS — F41.1 GENERALIZED ANXIETY DISORDER: ICD-10-CM

## 2024-09-23 DIAGNOSIS — F43.10 POST TRAUMATIC STRESS DISORDER (PTSD): Primary | ICD-10-CM

## 2024-09-23 PROCEDURE — 90837 PSYTX W PT 60 MINUTES: CPT | Performed by: SOCIAL WORKER
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
## 2024-10-28 ENCOUNTER — OFFICE VISIT (OUTPATIENT)
Dept: PSYCHIATRY | Facility: CLINIC | Age: 59
End: 2024-10-28
Payer: COMMERCIAL

## 2024-10-28 DIAGNOSIS — F41.1 GENERALIZED ANXIETY DISORDER: Chronic | ICD-10-CM

## 2024-10-28 DIAGNOSIS — F41.1 GENERALIZED ANXIETY DISORDER: ICD-10-CM

## 2024-10-28 DIAGNOSIS — F43.10 POST TRAUMATIC STRESS DISORDER (PTSD): Primary | ICD-10-CM

## 2024-10-28 DIAGNOSIS — F51.5 NIGHTMARES: ICD-10-CM

## 2024-10-28 DIAGNOSIS — F33.1 MAJOR DEPRESSIVE DISORDER, RECURRENT EPISODE, MODERATE: ICD-10-CM

## 2024-10-28 RX ORDER — LORAZEPAM 1 MG/1
1 TABLET ORAL EVERY 8 HOURS PRN
Qty: 90 TABLET | Refills: 2 | Status: SHIPPED | OUTPATIENT
Start: 2024-10-28
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
## 2024-11-12 ENCOUNTER — OFFICE VISIT (OUTPATIENT)
Dept: FAMILY MEDICINE CLINIC | Facility: CLINIC | Age: 59
End: 2024-11-12
Payer: COMMERCIAL

## 2024-11-12 VITALS
WEIGHT: 146.2 LBS | DIASTOLIC BLOOD PRESSURE: 70 MMHG | TEMPERATURE: 98 F | OXYGEN SATURATION: 97 % | HEART RATE: 70 BPM | BODY MASS INDEX: 26.91 KG/M2 | HEIGHT: 62 IN | SYSTOLIC BLOOD PRESSURE: 120 MMHG

## 2024-11-12 DIAGNOSIS — B02.8 HERPES ZOSTER WITH OTHER COMPLICATION: Primary | Chronic | ICD-10-CM

## 2024-11-12 DIAGNOSIS — I47.10 SVT (SUPRAVENTRICULAR TACHYCARDIA): ICD-10-CM

## 2024-11-12 DIAGNOSIS — H53.9 VISUAL DISTURBANCES: ICD-10-CM

## 2024-11-12 DIAGNOSIS — Z12.31 ENCOUNTER FOR SCREENING MAMMOGRAM FOR MALIGNANT NEOPLASM OF BREAST: ICD-10-CM

## 2024-11-12 DIAGNOSIS — E78.2 MIXED HYPERLIPIDEMIA: Chronic | ICD-10-CM

## 2024-11-12 DIAGNOSIS — R73.03 PREDIABETES: Chronic | ICD-10-CM

## 2024-11-12 DIAGNOSIS — I10 ESSENTIAL HYPERTENSION: Chronic | ICD-10-CM

## 2024-11-12 DIAGNOSIS — Z51.81 MEDICATION MONITORING ENCOUNTER: ICD-10-CM

## 2024-11-12 DIAGNOSIS — F33.1 MAJOR DEPRESSIVE DISORDER, RECURRENT EPISODE, MODERATE DEGREE: ICD-10-CM

## 2024-11-12 DIAGNOSIS — K21.9 GASTROESOPHAGEAL REFLUX DISEASE WITHOUT ESOPHAGITIS: ICD-10-CM

## 2024-11-12 PROCEDURE — 99214 OFFICE O/P EST MOD 30 MIN: CPT | Performed by: INTERNAL MEDICINE

## 2024-11-12 RX ORDER — VALACYCLOVIR HYDROCHLORIDE 1 G/1
1000 TABLET, FILM COATED ORAL 3 TIMES DAILY PRN
Qty: 90 TABLET | Refills: 1 | Status: SHIPPED | OUTPATIENT
Start: 2024-11-12

## 2024-11-12 RX ORDER — LISINOPRIL 10 MG/1
10 TABLET ORAL DAILY
Qty: 90 TABLET | Refills: 1 | Status: SHIPPED | OUTPATIENT
Start: 2024-11-12

## 2024-11-12 RX ORDER — METOPROLOL SUCCINATE 50 MG/1
50 TABLET, EXTENDED RELEASE ORAL
Qty: 90 TABLET | Refills: 1 | Status: SHIPPED | OUTPATIENT
Start: 2024-11-12

## 2024-11-12 RX ORDER — ROSUVASTATIN CALCIUM 10 MG/1
10 TABLET, COATED ORAL DAILY
Qty: 90 TABLET | Refills: 1 | Status: SHIPPED | OUTPATIENT
Start: 2024-11-12

## 2024-11-12 RX ORDER — LANSOPRAZOLE 30 MG/1
30 CAPSULE, DELAYED RELEASE ORAL DAILY
Qty: 90 CAPSULE | Refills: 1 | Status: SHIPPED | OUTPATIENT
Start: 2024-11-12

## 2024-11-12 RX ORDER — ESCITALOPRAM OXALATE 20 MG/1
20 TABLET ORAL DAILY
Qty: 90 TABLET | Refills: 1 | Status: SHIPPED | OUTPATIENT
Start: 2024-11-12

## 2024-11-12 RX ORDER — MECOBALAMIN 5000 MCG
30 TABLET,DISINTEGRATING ORAL DAILY
COMMUNITY
End: 2024-11-12 | Stop reason: SDUPTHER
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
## 2024-11-19 ENCOUNTER — CLINICAL SUPPORT (OUTPATIENT)
Dept: FAMILY MEDICINE CLINIC | Facility: CLINIC | Age: 59
End: 2024-11-19
Payer: COMMERCIAL

## 2024-11-19 DIAGNOSIS — R73.03 PREDIABETES: Chronic | ICD-10-CM

## 2024-11-19 DIAGNOSIS — E78.2 MIXED HYPERLIPIDEMIA: Chronic | ICD-10-CM

## 2024-11-19 DIAGNOSIS — I10 ESSENTIAL HYPERTENSION: Chronic | ICD-10-CM

## 2024-11-19 DIAGNOSIS — Z51.81 MEDICATION MONITORING ENCOUNTER: ICD-10-CM

## 2024-11-19 LAB
ALBUMIN SERPL-MCNC: 4.3 G/DL (ref 3.5–5.2)
ALBUMIN/GLOB SERPL: 1.4 G/DL
ALP SERPL-CCNC: 81 U/L (ref 39–117)
ALT SERPL W P-5'-P-CCNC: 19 U/L (ref 1–33)
AMPHET+METHAMPHET UR QL: NEGATIVE
AMPHETAMINES UR QL: NEGATIVE
ANION GAP SERPL CALCULATED.3IONS-SCNC: 10.1 MMOL/L (ref 5–15)
AST SERPL-CCNC: 20 U/L (ref 1–32)
BARBITURATES UR QL SCN: NEGATIVE
BENZODIAZ UR QL SCN: POSITIVE
BILIRUB SERPL-MCNC: 0.2 MG/DL (ref 0–1.2)
BUN SERPL-MCNC: 12 MG/DL (ref 6–20)
BUN/CREAT SERPL: 14.1 (ref 7–25)
BUPRENORPHINE SERPL-MCNC: NEGATIVE NG/ML
CALCIUM SPEC-SCNC: 9.3 MG/DL (ref 8.6–10.5)
CANNABINOIDS SERPL QL: NEGATIVE
CHLORIDE SERPL-SCNC: 103 MMOL/L (ref 98–107)
CHOLEST SERPL-MCNC: 161 MG/DL (ref 0–200)
CK SERPL-CCNC: 59 U/L (ref 20–180)
CO2 SERPL-SCNC: 24.9 MMOL/L (ref 22–29)
COCAINE UR QL: NEGATIVE
CREAT SERPL-MCNC: 0.85 MG/DL (ref 0.57–1)
DEPRECATED RDW RBC AUTO: 41.9 FL (ref 37–54)
EGFRCR SERPLBLD CKD-EPI 2021: 79 ML/MIN/1.73
ERYTHROCYTE [DISTWIDTH] IN BLOOD BY AUTOMATED COUNT: 12.9 % (ref 12.3–15.4)
FENTANYL UR-MCNC: NEGATIVE NG/ML
GLOBULIN UR ELPH-MCNC: 3 GM/DL
GLUCOSE SERPL-MCNC: 92 MG/DL (ref 65–99)
HBA1C MFR BLD: 5.8 % (ref 4.8–5.6)
HCT VFR BLD AUTO: 38.9 % (ref 34–46.6)
HDLC SERPL-MCNC: 43 MG/DL (ref 40–60)
HGB BLD-MCNC: 13.6 G/DL (ref 12–15.9)
LDLC SERPL CALC-MCNC: 82 MG/DL (ref 0–100)
LDLC/HDLC SERPL: 1.75 {RATIO}
MCH RBC QN AUTO: 31.5 PG (ref 26.6–33)
MCHC RBC AUTO-ENTMCNC: 35 G/DL (ref 31.5–35.7)
MCV RBC AUTO: 90 FL (ref 79–97)
METHADONE UR QL SCN: NEGATIVE
OPIATES UR QL: NEGATIVE
OXYCODONE UR QL SCN: NEGATIVE
PCP UR QL SCN: NEGATIVE
PLATELET # BLD AUTO: 252 10*3/MM3 (ref 140–450)
PMV BLD AUTO: 10.2 FL (ref 6–12)
POTASSIUM SERPL-SCNC: 4.2 MMOL/L (ref 3.5–5.2)
PROT SERPL-MCNC: 7.3 G/DL (ref 6–8.5)
RBC # BLD AUTO: 4.32 10*6/MM3 (ref 3.77–5.28)
SODIUM SERPL-SCNC: 138 MMOL/L (ref 136–145)
TRICYCLICS UR QL SCN: NEGATIVE
TRIGL SERPL-MCNC: 214 MG/DL (ref 0–150)
TSH SERPL DL<=0.05 MIU/L-ACNC: 2.31 UIU/ML (ref 0.27–4.2)
VLDLC SERPL-MCNC: 36 MG/DL (ref 5–40)
WBC NRBC COR # BLD AUTO: 5.86 10*3/MM3 (ref 3.4–10.8)

## 2024-11-19 PROCEDURE — 80307 DRUG TEST PRSMV CHEM ANLYZR: CPT | Performed by: INTERNAL MEDICINE

## 2024-11-19 PROCEDURE — 80050 GENERAL HEALTH PANEL: CPT | Performed by: INTERNAL MEDICINE

## 2024-11-19 PROCEDURE — 83036 HEMOGLOBIN GLYCOSYLATED A1C: CPT | Performed by: INTERNAL MEDICINE

## 2024-11-19 PROCEDURE — 36415 COLL VENOUS BLD VENIPUNCTURE: CPT | Performed by: INTERNAL MEDICINE

## 2024-11-19 PROCEDURE — 80061 LIPID PANEL: CPT | Performed by: INTERNAL MEDICINE

## 2024-11-19 PROCEDURE — 82550 ASSAY OF CK (CPK): CPT | Performed by: INTERNAL MEDICINE

## 2024-11-19 NOTE — PROGRESS NOTES
Venipuncture Blood Specimen Collection  Venipuncture performed in right arm by Leslie Kc MA with good hemostasis. Patient tolerated the procedure well without complications.   11/19/24   Leslie Kc MA

## 2024-11-25 ENCOUNTER — OFFICE VISIT (OUTPATIENT)
Dept: PSYCHIATRY | Facility: CLINIC | Age: 59
End: 2024-11-25
Payer: COMMERCIAL

## 2024-11-25 DIAGNOSIS — F51.5 NIGHTMARES: ICD-10-CM

## 2024-11-25 DIAGNOSIS — F33.1 MAJOR DEPRESSIVE DISORDER, RECURRENT EPISODE, MODERATE: ICD-10-CM

## 2024-11-25 DIAGNOSIS — F43.10 POST TRAUMATIC STRESS DISORDER (PTSD): Primary | ICD-10-CM

## 2024-11-25 DIAGNOSIS — F43.21 GRIEF REACTION: ICD-10-CM

## 2024-11-25 DIAGNOSIS — F41.1 GENERALIZED ANXIETY DISORDER: ICD-10-CM
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
## 2025-01-09 ENCOUNTER — OFFICE VISIT (OUTPATIENT)
Dept: PSYCHIATRY | Facility: CLINIC | Age: 60
End: 2025-01-09
Payer: COMMERCIAL

## 2025-01-09 DIAGNOSIS — F51.5 NIGHTMARES: ICD-10-CM

## 2025-01-09 DIAGNOSIS — F33.1 MAJOR DEPRESSIVE DISORDER, RECURRENT EPISODE, MODERATE: ICD-10-CM

## 2025-01-09 DIAGNOSIS — F43.10 POST TRAUMATIC STRESS DISORDER (PTSD): Primary | ICD-10-CM

## 2025-01-09 DIAGNOSIS — F41.1 GENERALIZED ANXIETY DISORDER: ICD-10-CM

## 2025-01-09 NOTE — TREATMENT PLAN
Multi-Disciplinary Problems (from Behavioral Health Treatment Plan)      Active Problems       Problem: Post Traumatic Stress  Start Date: 01/09/25      Problem Details: The patient self-scales this problem as a 6 with 10 being the worst.          Goal Priority Start Date Expected End Date End Date    Patient will process and move through trauma in a way that improves self regard and the patients ability to function optimally in the world around them. High 01/09/25 07/10/25 --    Goal Details: Progress toward goal:  The patient self-scales their progress related to this goal as a 6 with 10 being the worst.          Goal Intervention Frequency Start Date End Date    Assist patient in identifying ways that trauma has negatively impacted their view of themselves and the world. Weekly 01/09/25 --    Intervention Details: Duration of treatment until remission of symptoms.          Goal Intervention Frequency Start Date End Date    Process trauma in the context of the safe session environment. Weekly 01/09/25 --    Intervention Details: Duration of treatment until remission of symptoms.          Goal Intervention Frequency Start Date End Date    Develop a plan of behavior changes that will reduce the stress of the trauma. Weekly 01/09/25 --    Intervention Details: Duration of treatment until remission of symptoms.                                 I have discussed and reviewed this treatment plan with the patient.

## 2025-01-27 DIAGNOSIS — F41.1 GENERALIZED ANXIETY DISORDER: Chronic | ICD-10-CM

## 2025-01-27 RX ORDER — LORAZEPAM 1 MG/1
1 TABLET ORAL EVERY 8 HOURS PRN
Qty: 90 TABLET | Refills: 2 | Status: SHIPPED | OUTPATIENT
Start: 2025-01-27

## 2025-02-03 ENCOUNTER — OFFICE VISIT (OUTPATIENT)
Dept: PSYCHIATRY | Facility: CLINIC | Age: 60
End: 2025-02-03
Payer: COMMERCIAL

## 2025-02-03 DIAGNOSIS — F41.1 GENERALIZED ANXIETY DISORDER: ICD-10-CM

## 2025-02-03 DIAGNOSIS — F43.10 POST TRAUMATIC STRESS DISORDER (PTSD): Primary | ICD-10-CM

## 2025-02-03 DIAGNOSIS — F51.5 NIGHTMARES: ICD-10-CM

## 2025-02-03 NOTE — LETTER
[facility]  [address] [address]  Dept: [phone]    25    RE:   Patient Name:  Nicole Cannon   :  1965      To Whom It May Concern:     Nicole is my patient, and has been under my care since 2023.  I am familiar with her history and with the functional limitations imposed by her disability.  She meets the definition of disability under the Americans with Disabilities Act, the Fair Housing Act, and the Rehabilitation Act of .     Due to mental illness, Nicole has certain limitations regarding her Posttraumatic Stress Disorder.  In order to help alleviate these difficulties, and to enhance his/her ability to live independently I am prescribing an emotional support animal that will assist Nicole in coping with her disability.    I would be happy to answer other questions you may have concerning my recommendation that Nicole have an emotional support animal.  Should you have additional questions, please do not hesitate to contact me.    Sincerely,          Brian Louie, JESS, [facility]   Psychotherapist
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
## 2025-02-25 ENCOUNTER — TELEMEDICINE (OUTPATIENT)
Dept: FAMILY MEDICINE CLINIC | Facility: CLINIC | Age: 60
End: 2025-02-25
Payer: COMMERCIAL

## 2025-02-25 DIAGNOSIS — J06.9 ACUTE URI: Primary | ICD-10-CM

## 2025-02-25 DIAGNOSIS — H92.09 EARACHE: ICD-10-CM

## 2025-02-25 DIAGNOSIS — R05.1 ACUTE COUGH: ICD-10-CM

## 2025-02-25 PROCEDURE — 99213 OFFICE O/P EST LOW 20 MIN: CPT | Performed by: NURSE PRACTITIONER

## 2025-02-25 RX ORDER — AZITHROMYCIN 250 MG/1
TABLET, FILM COATED ORAL
Qty: 6 TABLET | Refills: 0 | Status: SHIPPED | OUTPATIENT
Start: 2025-02-25

## 2025-02-25 RX ORDER — BENZONATATE 200 MG/1
200 CAPSULE ORAL 3 TIMES DAILY PRN
Qty: 30 CAPSULE | Refills: 0 | Status: SHIPPED | OUTPATIENT
Start: 2025-02-25

## 2025-02-25 RX ORDER — METHYLPREDNISOLONE 4 MG/1
TABLET ORAL
Qty: 21 TABLET | Refills: 0 | Status: SHIPPED | OUTPATIENT
Start: 2025-02-25

## 2025-02-25 RX ORDER — DOXYCYCLINE 100 MG/1
100 CAPSULE ORAL 2 TIMES DAILY
Qty: 20 CAPSULE | Refills: 0 | Status: SHIPPED | OUTPATIENT
Start: 2025-02-25 | End: 2025-02-25

## 2025-02-25 NOTE — PROGRESS NOTES
Patient Name: Nicole Cannon Today's Date: 2025   Patient MRN / CSN: [id number] / [id number] Date of Encounter: 2025   Patient Age / : 59 y.o. / 1965 Encounter Provider: TANMAY Whitt   Referring Physician: No ref. provider found          Nicole is a 59 y.o. female who is being seen today for Sinusitis and Cough      Cough  This is a new problem. The current episode started today. Associated symptoms include ear pain. She has tried nothing for the symptoms. The treatment provided no relief.   Earache   There is pain in the right ear. The current episode started today. The problem occurs constantly. There has been no fever. The pain is moderate. Associated symptoms include coughing. She has tried nothing for the symptoms. The treatment provided no relief.       Allergies include:Patient has no known allergies.  Current Outpatient Medications   Medication Sig Dispense Refill    azithromycin (Zithromax Z-Lane) 250 MG tablet Take 2 tablets by mouth on day 1, then 1 tablet daily on days 2-5 6 tablet 0    benzonatate (TESSALON) 200 MG capsule Take 1 capsule by mouth 3 (Three) Times a Day As Needed for Cough. 30 capsule 0    escitalopram (LEXAPRO) 20 MG tablet Take 1 tablet by mouth Daily. 90 tablet 1    lansoprazole (PREVACID) 30 MG capsule Take 1 capsule by mouth Daily. Indications: Gastroesophageal Reflux Disease 90 capsule 1    lisinopril (PRINIVIL,ZESTRIL) 10 MG tablet Take 1 tablet by mouth Daily. 90 tablet 1    LORazepam (ATIVAN) 1 MG tablet Take 1 tablet by mouth Every 8 (Eight) Hours As Needed for Anxiety. 90 tablet 2    methylPREDNISolone (MEDROL) 4 MG dose pack Take as directed on package instructions. 21 tablet 0    metoprolol succinate XL (TOPROL-XL) 50 MG 24 hr tablet Take 1 tablet by mouth Daily. 90 tablet 1    rosuvastatin (CRESTOR) 10 MG tablet Take 1 tablet by mouth Daily. 90 tablet 1    valACYclovir (Valtrex) 1000 MG tablet Take 1 tablet by mouth 3 (Three) Times a Day As Needed  (shingles outbreak). prn 90 tablet 1     No current facility-administered medications for this visit.     Past Medical History:   Diagnosis Date    Anxiety     Arrhythmia     CRPS (complex regional pain syndrome) type I of lower limb     Dysfunctional uterine bleeding     Dysmenorrhea     Foot fracture     Fracture     left shoulder, right knee    Hand pain     Hyperlipidemia     Itching     Low back pain     Lump of skin     [place] syndrome (geniculate herpes zoster)     Seborrheic keratoses      Family History   Problem Relation Age of Onset    Diabetes Mother     Hyperlipidemia Mother     Hypertension Mother     Thyroid disease Mother     Hyperlipidemia Father     Cancer Father         PROSTATE     No past surgical history on file.  Social History     Substance and Sexual Activity   Alcohol Use No     Social History     Tobacco Use   Smoking Status Never   Smokeless Tobacco Never     Social History     Substance and Sexual Activity   Drug Use No     Review of Systems   HENT:  Positive for ear pain.    Respiratory:  Positive for cough.         Depression Assessment Review:  PHQ-9 Total Score:    Vital Signs & Measurements Taken This Encounter  There were no vitals taken for this visit.   There were no vitals filed for this visit.        Physical Exam  Constitutional:       Appearance: Normal appearance.   Neurological:      General: No focal deficit present.      Mental Status: She is alert and oriented to person, place, and time.   Psychiatric:         Mood and Affect: Mood normal.         Behavior: Behavior normal.        Fall Risk Assessment:  Fall Risk Assessment was completed, and patient is at low risk for falls.    Assessment & Plan  Patient Active Problem List   Diagnosis    Atopic rhinitis    Generalized anxiety disorder    Menopause present    SVT (supraventricular tachycardia)    Arthritis    Paroxysmal SVT (supraventricular tachycardia)    Essential hypertension    Mixed hyperlipidemia    Shingles     Stress incontinence of urine    Chronic fatigue    Health care maintenance    Prediabetes    Major depressive disorder, recurrent episode, moderate degree    Gastroesophageal reflux disease without esophagitis    Chronic pain of both knees    Chronic pain of both ankles       ICD-10-CM ICD-9-CM   1. Acute URI  J06.9 465.9   2. Acute cough  R05.1 786.2   3. Earache  H92.09 388.70     Diagnoses and all orders for this visit:    1. Acute URI (Primary)  -     methylPREDNISolone (MEDROL) 4 MG dose pack; Take as directed on package instructions.  Dispense: 21 tablet; Refill: 0  -     azithromycin (Zithromax Z-Lane) 250 MG tablet; Take 2 tablets by mouth on day 1, then 1 tablet daily on days 2-5  Dispense: 6 tablet; Refill: 0    2. Acute cough  -     benzonatate (TESSALON) 200 MG capsule; Take 1 capsule by mouth 3 (Three) Times a Day As Needed for Cough.  Dispense: 30 capsule; Refill: 0    3. Earache    Other orders  -     Discontinue: doxycycline (VIBRAMYCIN) 100 MG capsule; Take 1 capsule by mouth 2 (Two) Times a Day.  Dispense: 20 capsule; Refill: 0       -- She presents today with complaints of cough and congestion.  For this URI I will start her on a Medrol dose pack as well as azithromycin.  I will send in Tessalon Perles for her acute cough as well.     As a means of preventing the spread of the COVID 19, this visit was done virtually via video platform through [facility] as consented by the patient. Patient was at home and provider at [facility] office during this visit. The patient consented to this telehealth visit and signed the video visit consent form. This visit included audio and video interaction. There were no technical issues that occurred during this visit.        Follow-up on file with Dr. Davis.           This document has been electronically signed by TANMAY Whitt  February 25, 2025 15:59 EST    TANMAY Whitt  [address] [place] [address]  [phone] (office)    Part of this  note may be an electronic transcription/translation of spoken language to printed text using the Dragon Dictation System.

## 2025-03-12 ENCOUNTER — OFFICE VISIT (OUTPATIENT)
Dept: FAMILY MEDICINE CLINIC | Facility: CLINIC | Age: 60
End: 2025-03-12
Payer: COMMERCIAL

## 2025-03-12 VITALS
TEMPERATURE: 98 F | DIASTOLIC BLOOD PRESSURE: 62 MMHG | WEIGHT: 147.8 LBS | SYSTOLIC BLOOD PRESSURE: 120 MMHG | HEIGHT: 62 IN | HEART RATE: 75 BPM | OXYGEN SATURATION: 94 % | BODY MASS INDEX: 27.2 KG/M2

## 2025-03-12 DIAGNOSIS — F41.1 GENERALIZED ANXIETY DISORDER: Chronic | ICD-10-CM

## 2025-03-12 DIAGNOSIS — F33.1 MAJOR DEPRESSIVE DISORDER, RECURRENT EPISODE, MODERATE DEGREE: ICD-10-CM

## 2025-03-12 DIAGNOSIS — Z12.4 CERVICAL CANCER SCREENING: ICD-10-CM

## 2025-03-12 DIAGNOSIS — K21.9 GASTROESOPHAGEAL REFLUX DISEASE WITHOUT ESOPHAGITIS: ICD-10-CM

## 2025-03-12 DIAGNOSIS — Z00.00 ENCOUNTER FOR WELLNESS EXAMINATION: Primary | ICD-10-CM

## 2025-03-12 DIAGNOSIS — Z00.00 HEALTH CARE MAINTENANCE: ICD-10-CM

## 2025-03-12 DIAGNOSIS — Z12.11 COLON CANCER SCREENING: ICD-10-CM

## 2025-03-12 DIAGNOSIS — Z23 NEED FOR INFLUENZA VACCINATION: ICD-10-CM

## 2025-03-12 RX ORDER — ESCITALOPRAM OXALATE 20 MG/1
20 TABLET ORAL DAILY
Qty: 90 TABLET | Refills: 1 | Status: SHIPPED | OUTPATIENT
Start: 2025-03-12

## 2025-03-12 RX ORDER — MULTIPLE VITAMINS W/ MINERALS TAB 9MG-400MCG
1 TAB ORAL DAILY
COMMUNITY

## 2025-03-12 RX ORDER — CETIRIZINE HYDROCHLORIDE 5 MG/1
5 TABLET ORAL DAILY
COMMUNITY

## 2025-03-12 RX ORDER — ESOMEPRAZOLE MAGNESIUM 40 MG/1
40 CAPSULE, DELAYED RELEASE ORAL
Qty: 90 CAPSULE | Refills: 1 | Status: SHIPPED | OUTPATIENT
Start: 2025-03-12

## 2025-03-12 NOTE — PROGRESS NOTES
Patient Name: Nicole Cannon Today's Date: 3/12/2025   Patient MRN / CSN: [id number] / [id number] Date of Encounter: 3/12/2025   Patient Age / : 59 y.o. / 1965 Encounter Provider: Swathi Davis DO   Referring Physician: No ref. provider found          Nicole is a 59 y.o. female who is being seen today for Follow-up (Not feeling very well today. She has been very tired. Having issues with Shingles, and has  a detached Retina ), Hypertension (Not checking blood pressure at home. ), Hyperlipidemia, Anxiety, and Annual Exam      Hypertension  This is a chronic problem. The current episode started more than 1 year ago. The problem has been stable since onset. The problem is controlled. Associated symptoms include anxiety. Pertinent negatives include no shortness of breath. Current antihypertension treatment includes ACE inhibitors and beta blockers. The current treatment provides significant improvement. There are no compliance problems.    Hyperlipidemia  This is a chronic problem. The current episode started more than 1 year ago. Pertinent negatives include no shortness of breath. Current antihyperlipidemic treatment includes statins. The current treatment provides significant improvement of lipids. There are no compliance problems.    Anxiety  Presents for follow-up visit.  Symptoms include excessive worry, nervous/anxious behavior and panic.  Patient reports no shortness of breath. The severity of symptoms is moderate. Treatments tried: Nicole reports her moods are doing well with the current medicine regimen.  Lexapro and Ativan have helped significantly and she is tolerating the regimen well.       Pt presents for HM exam today. Pt reports doing well.      Healthy Diet: trying to  Exercise: doing wall pilates and chair yoga  Regular Eye Exam: utd  Regular Dental Exam: plans to update soon   Hearing Loss: no  Immunizations: will update flu shot today  Mammogram: , nl  Pap: prefers to follow up with gyn,  needs appt scheduled  Colonoscopy: due, agrees to cologuard    Allergies include:Patient has no known allergies.  Current Outpatient Medications   Medication Sig Dispense Refill    cetirizine (zyrTEC) 5 MG tablet Take 1 tablet by mouth Daily.      escitalopram (LEXAPRO) 20 MG tablet Take 1 tablet by mouth Daily. 90 tablet 1    lisinopril (PRINIVIL,ZESTRIL) 10 MG tablet Take 1 tablet by mouth Daily. 90 tablet 1    LORazepam (ATIVAN) 1 MG tablet Take 1 tablet by mouth Every 8 (Eight) Hours As Needed for Anxiety. 90 tablet 2    metoprolol succinate XL (TOPROL-XL) 50 MG 24 hr tablet Take 1 tablet by mouth Daily. 90 tablet 1    multivitamin with minerals tablet tablet Take 1 tablet by mouth Daily.      rosuvastatin (CRESTOR) 10 MG tablet Take 1 tablet by mouth Daily. 90 tablet 1    valACYclovir (Valtrex) 1000 MG tablet Take 1 tablet by mouth 3 (Three) Times a Day As Needed (shingles outbreak). prn 90 tablet 1    vitamin E 200 UNIT capsule Take 1 capsule by mouth Daily.      esomeprazole (nexIUM) 40 MG capsule Take 1 capsule by mouth Every Morning Before Breakfast. 90 capsule 1     No current facility-administered medications for this visit.     Past Medical History:   Diagnosis Date    Anxiety     Arrhythmia     CRPS (complex regional pain syndrome) type I of lower limb     Dysfunctional uterine bleeding     Dysmenorrhea     Foot fracture     Fracture     left shoulder, right knee    Hand pain     Hyperlipidemia     Itching     Low back pain     Lump of skin     Basia Hunt syndrome (geniculate herpes zoster)     Seborrheic keratoses      Family History   Problem Relation Age of Onset    Diabetes Mother     Hyperlipidemia Mother     Hypertension Mother     Thyroid disease Mother     Hyperlipidemia Father     Cancer Father         PROSTATE     History reviewed. No pertinent surgical history.  Social History     Substance and Sexual Activity   Alcohol Use No     Social History     Tobacco Use   Smoking Status Never  "  Smokeless Tobacco Never     Social History     Substance and Sexual Activity   Drug Use No     Review of Systems   Constitutional:  Negative for fever.   Respiratory:  Negative for shortness of breath.    Cardiovascular:         No symptoms at this time. She reports an episode of atypical cp recently, associated with acid reflux.  This episode resolved on its and she notes that she had eaten differently before the symptoms occurred.  She denies any associated diaphoresis, shortness of air, nausea, palpitations, or presyncope.   Gastrointestinal:  Negative for blood in stool.        Frequent acid reflux, 3-4 times weekly despite prevacid. She recalls taking protonix previously.    Genitourinary:  Negative for hematuria.   Psychiatric/Behavioral:  The patient is nervous/anxious.         Moods stable with current regimen. She reports tolerating this medicine regimen well.         Depression Assessment Review:  PHQ-9 Total Score: 6  Vital Signs & Measurements Taken This Encounter  /62 (BP Location: Left arm, Patient Position: Sitting, Cuff Size: Adult)   Pulse 75   Temp 98 °F (36.7 °C) (Oral)   Ht 157.5 cm (62\")   Wt 67 kg (147 lb 12.8 oz)   SpO2 94%   BMI 27.03 kg/m²    SpO2 Percentage    03/12/25 1502   SpO2: 94%        Physical Exam  Vitals reviewed.   Constitutional:       General: She is not in acute distress.  HENT:      Head: Normocephalic and atraumatic.   Eyes:      General: No scleral icterus.     Extraocular Movements: Extraocular movements intact.      Conjunctiva/sclera: Conjunctivae normal.      Pupils: Pupils are equal, round, and reactive to light.   Cardiovascular:      Rate and Rhythm: Normal rate and regular rhythm.   Pulmonary:      Effort: Pulmonary effort is normal. No respiratory distress.      Breath sounds: Normal breath sounds. No wheezing or rhonchi.   Musculoskeletal:         General: No swelling.      Cervical back: Neck supple. No tenderness.   Lymphadenopathy:      Cervical: " No cervical adenopathy.   Skin:     General: Skin is warm and dry.      Coloration: Skin is not jaundiced.   Neurological:      Mental Status: She is alert.   Psychiatric:         Mood and Affect: Mood normal.         Behavior: Behavior normal.         Thought Content: Thought content normal.         Judgment: Judgment normal.              Assessment & Plan  Patient Active Problem List   Diagnosis    Atopic rhinitis    Generalized anxiety disorder    Menopause present    SVT (supraventricular tachycardia)    Arthritis    Paroxysmal SVT (supraventricular tachycardia)    Essential hypertension    Mixed hyperlipidemia    Shingles    Stress incontinence of urine    Chronic fatigue    Health care maintenance    Prediabetes    Major depressive disorder, recurrent episode, moderate degree    Gastroesophageal reflux disease without esophagitis    Chronic pain of both knees    Chronic pain of both ankles       ICD-10-CM ICD-9-CM   1. Encounter for wellness examination  Z00.00 V70.0   2. Health care maintenance  Z00.00 V70.0   3. Gastroesophageal reflux disease without esophagitis  K21.9 530.81   4. Generalized anxiety disorder  F41.1 300.02   5. Major depressive disorder, recurrent episode, moderate degree  F33.1 296.32   6. Need for influenza vaccination  Z23 V04.81   7. Cervical cancer screening  Z12.4 V76.2   8. Colon cancer screening  Z12.11 V76.51     Diagnoses and all orders for this visit:    1. Encounter for wellness examination (Primary)    2. Health care maintenance    3. Gastroesophageal reflux disease without esophagitis  -     esomeprazole (nexIUM) 40 MG capsule; Take 1 capsule by mouth Every Morning Before Breakfast.  Dispense: 90 capsule; Refill: 1    4. Generalized anxiety disorder    5. Major depressive disorder, recurrent episode, moderate degree  -     escitalopram (LEXAPRO) 20 MG tablet; Take 1 tablet by mouth Daily.  Dispense: 90 tablet; Refill: 1    6. Need for influenza vaccination  -     Fluzone  >6mos    7. Cervical cancer screening  -     Ambulatory Referral to Gynecology    8. Colon cancer screening  -     Cologuard - Stool, Per Rectum; Future         Meds Ordered During Visit:  New Medications Ordered This Visit   Medications    escitalopram (LEXAPRO) 20 MG tablet     Sig: Take 1 tablet by mouth Daily.     Dispense:  90 tablet     Refill:  1     Please disregard any previous lexapro rxs. Patient takes 20mg daily.    esomeprazole (nexIUM) 40 MG capsule     Sig: Take 1 capsule by mouth Every Morning Before Breakfast.     Dispense:  90 capsule     Refill:  1     Immunizations were discussed with patient today.  Will update flu shot today.    Age-appropriate screenings were discussed with patient today.  We will plan to update metabolic screenings at next appointment, as patient would like some time to focus on diet and improve exercise habits before doing labs again.  I encouraged her to update Cologuard and Pap smear.  She would like to follow-up with gynecology for Pap smear.  She is currently up-to-date on mammogram.      I reviewed PDMP today.  I recommended changing Prevacid to Nexium.  We will continue the other medicines as previously prescribed at this time.  Updated refills as requested.      Return in about 3 months (around 6/12/2025), or if symptoms worsen or fail to improve, for Recheck.          Referring Provider (if known): No ref. provider found      This document has been electronically signed by Swathi Davis DO  March 12, 2025 16:26 EDT    Swathi Davis DO, FACOI  [address] [place] [address]  [phone] (office)    Part of this note may be an electronic transcription/translation of spoken language to printed text using the Dragon Dictation System.

## 2025-03-12 NOTE — PROGRESS NOTES
Immunization  Immunization performed in left deltoid by Heather Symes, MA. Patient tolerated the procedure well without complications.  03/12/25   Heather Symes, MA

## 2025-03-27 ENCOUNTER — OFFICE VISIT (OUTPATIENT)
Dept: PSYCHIATRY | Facility: CLINIC | Age: 60
End: 2025-03-27
Payer: COMMERCIAL

## 2025-03-27 DIAGNOSIS — F41.1 GENERALIZED ANXIETY DISORDER: ICD-10-CM

## 2025-03-27 DIAGNOSIS — F43.10 POST TRAUMATIC STRESS DISORDER (PTSD): Primary | ICD-10-CM

## 2025-03-27 DIAGNOSIS — F33.1 MAJOR DEPRESSIVE DISORDER, RECURRENT EPISODE, MODERATE: ICD-10-CM

## 2025-03-31 DIAGNOSIS — F41.1 GENERALIZED ANXIETY DISORDER: Chronic | ICD-10-CM

## 2025-03-31 RX ORDER — LORAZEPAM 1 MG/1
1 TABLET ORAL EVERY 8 HOURS PRN
Qty: 90 TABLET | Refills: 2 | Status: SHIPPED | OUTPATIENT
Start: 2025-03-31
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
## 2025-04-09 ENCOUNTER — TELEPHONE (OUTPATIENT)
Dept: FAMILY MEDICINE CLINIC | Facility: CLINIC | Age: 60
End: 2025-04-09
Payer: COMMERCIAL

## 2025-04-09 DIAGNOSIS — F41.1 GENERALIZED ANXIETY DISORDER: Chronic | ICD-10-CM

## 2025-04-09 NOTE — TELEPHONE ENCOUNTER
Caller: Nicole Cannon    Relationship: Self    Best call back number: [phone]    Requested Prescriptions:   Requested Prescriptions      No prescriptions requested or ordered in this encounter        LORazepam (ATIVAN) 1 MG tablet     Pharmacy where request should be sent: [facility] [place] - [place] - [address]  - [phone]  - [phone]     Last office visit with prescribing clinician: 3/12/2025   Last telemedicine visit with prescribing clinician: 2/25/2025   Next office visit with prescribing clinician: 6/12/2025     Does the patient have less than a 3 day supply:  [] Yes  [x] No    Would you like a call back once the refill request has been completed: [] Yes [x] No    If the office needs to give you a call back, can they leave a voicemail: [] Yes [x] No    Cinthia Keller, PCT   04/09/25 15:49 EDT

## 2025-04-10 RX ORDER — LORAZEPAM 1 MG/1
1 TABLET ORAL EVERY 8 HOURS PRN
Qty: 90 TABLET | Refills: 2 | Status: SHIPPED | OUTPATIENT
Start: 2025-04-10 | End: 2025-04-12 | Stop reason: SDUPTHER

## 2025-04-11 ENCOUNTER — TELEPHONE (OUTPATIENT)
Dept: FAMILY MEDICINE CLINIC | Facility: CLINIC | Age: 60
End: 2025-04-11
Payer: COMMERCIAL

## 2025-04-11 DIAGNOSIS — F41.1 GENERALIZED ANXIETY DISORDER: Chronic | ICD-10-CM

## 2025-04-11 NOTE — TELEPHONE ENCOUNTER
Caller: Nicole Cannon    Relationship to patient: Self    Best call back number: [phone]     PATIENT STATES THAT DR ROY SENT IN A PRESCRIPTION FOR LORAZEPAM TO [facility] [place], BUT THEY WILL NOT FILL IT.  SHE HAS TO GO TO A PHARMACY IN [place].  SHE WANTS IT SENT TO [facility].

## 2025-04-12 RX ORDER — LORAZEPAM 1 MG/1
1 TABLET ORAL EVERY 8 HOURS PRN
Qty: 90 TABLET | Refills: 2 | Status: SHIPPED | OUTPATIENT
Start: 2025-04-12

## 2025-06-02 ENCOUNTER — OFFICE VISIT (OUTPATIENT)
Dept: PSYCHIATRY | Facility: CLINIC | Age: 60
End: 2025-06-02
Payer: COMMERCIAL

## 2025-06-02 DIAGNOSIS — F43.10 POST TRAUMATIC STRESS DISORDER (PTSD): Primary | ICD-10-CM

## 2025-06-02 DIAGNOSIS — F43.20 GRIEF REACTION: ICD-10-CM

## 2025-06-02 DIAGNOSIS — F41.1 GENERALIZED ANXIETY DISORDER: ICD-10-CM

## 2025-06-02 DIAGNOSIS — F33.1 MAJOR DEPRESSIVE DISORDER, RECURRENT EPISODE, MODERATE: ICD-10-CM

## 2025-06-03 NOTE — TREATMENT PLAN
Multi-Disciplinary Problems (from Behavioral Health Treatment Plan)      Active Problems       Problem:  Patient Care Overview (Adult)  Start Date: 06/03/25      Problem Details: Patient continues in psychotherapy with the undersigned every 1 to 2 months and continues in pharmacotherapy with her primary care provider.  Patient continues to maintain relative stability as compared to her baseline.  However, the patient continues to struggle with periods of symptoms of posttraumatic stress disorder and difficulty with adjustment.  The patient symptomology continues to cause impairment in important areas of functioning.  As a result, the patient can be reasonably expected benefit from ongoing treatment and would likely be at increased risk for decompensation otherwise.                               I have discussed and reviewed this treatment plan with the patient.
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
## 2025-07-07 ENCOUNTER — OFFICE VISIT (OUTPATIENT)
Dept: PSYCHIATRY | Facility: CLINIC | Age: 60
End: 2025-07-07
Payer: COMMERCIAL

## 2025-07-07 DIAGNOSIS — Z63.6 CAREGIVER STRESS: ICD-10-CM

## 2025-07-07 DIAGNOSIS — F43.10 POST TRAUMATIC STRESS DISORDER (PTSD): Primary | ICD-10-CM

## 2025-07-07 DIAGNOSIS — F51.5 NIGHTMARES: ICD-10-CM

## 2025-07-07 DIAGNOSIS — F33.1 MAJOR DEPRESSIVE DISORDER, RECURRENT EPISODE, MODERATE: ICD-10-CM

## 2025-07-07 DIAGNOSIS — F41.1 GENERALIZED ANXIETY DISORDER: ICD-10-CM

## 2025-07-07 SDOH — SOCIAL STABILITY - SOCIAL INSECURITY: DEPENDENT RELATIVE NEEDING CARE AT HOME: Z63.6
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
## 2025-07-16 DIAGNOSIS — F41.1 GENERALIZED ANXIETY DISORDER: Chronic | ICD-10-CM

## 2025-07-16 RX ORDER — LORAZEPAM 1 MG/1
1 TABLET ORAL EVERY 8 HOURS PRN
Qty: 90 TABLET | Refills: 2 | Status: CANCELLED | OUTPATIENT
Start: 2025-07-16

## 2025-07-17 RX ORDER — LORAZEPAM 1 MG/1
1 TABLET ORAL EVERY 8 HOURS PRN
Qty: 90 TABLET | Refills: 1 | Status: SHIPPED | OUTPATIENT
Start: 2025-07-17

## 2025-07-21 ENCOUNTER — OFFICE VISIT (OUTPATIENT)
Dept: PSYCHIATRY | Facility: CLINIC | Age: 60
End: 2025-07-21
Payer: COMMERCIAL

## 2025-07-21 DIAGNOSIS — F33.1 MAJOR DEPRESSIVE DISORDER, RECURRENT EPISODE, MODERATE: ICD-10-CM

## 2025-07-21 DIAGNOSIS — F43.10 POST TRAUMATIC STRESS DISORDER (PTSD): Primary | ICD-10-CM

## 2025-07-21 DIAGNOSIS — F41.1 GENERALIZED ANXIETY DISORDER: ICD-10-CM

## 2025-07-21 DIAGNOSIS — Z63.6 CAREGIVER STRESS: ICD-10-CM

## 2025-07-21 SDOH — SOCIAL STABILITY - SOCIAL INSECURITY: DEPENDENT RELATIVE NEEDING CARE AT HOME: Z63.6
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
## 2025-08-11 ENCOUNTER — OFFICE VISIT (OUTPATIENT)
Dept: PSYCHIATRY | Facility: CLINIC | Age: 60
End: 2025-08-11
Payer: COMMERCIAL

## 2025-08-11 DIAGNOSIS — F33.1 MAJOR DEPRESSIVE DISORDER, RECURRENT EPISODE, MODERATE: ICD-10-CM

## 2025-08-11 DIAGNOSIS — F41.1 GENERALIZED ANXIETY DISORDER: ICD-10-CM

## 2025-08-11 DIAGNOSIS — F43.10 POST TRAUMATIC STRESS DISORDER (PTSD): Primary | ICD-10-CM

## 2025-08-11 DIAGNOSIS — Z63.6 CAREGIVER STRESS: ICD-10-CM

## 2025-08-11 DIAGNOSIS — F51.5 NIGHTMARES: ICD-10-CM

## 2025-08-11 SDOH — SOCIAL STABILITY - SOCIAL INSECURITY: DEPENDENT RELATIVE NEEDING CARE AT HOME: Z63.6

## 2025-08-12 DIAGNOSIS — F33.1 MAJOR DEPRESSIVE DISORDER, RECURRENT EPISODE, MODERATE DEGREE: ICD-10-CM

## 2025-08-12 RX ORDER — ESCITALOPRAM OXALATE 20 MG/1
20 TABLET ORAL DAILY
Qty: 90 TABLET | Refills: 1 | Status: SHIPPED | OUTPATIENT
Start: 2025-08-12

## 2025-08-25 ENCOUNTER — OFFICE VISIT (OUTPATIENT)
Dept: FAMILY MEDICINE CLINIC | Facility: CLINIC | Age: 60
End: 2025-08-25
Payer: COMMERCIAL

## 2025-08-25 VITALS
WEIGHT: 146.8 LBS | TEMPERATURE: 98.1 F | OXYGEN SATURATION: 97 % | DIASTOLIC BLOOD PRESSURE: 72 MMHG | SYSTOLIC BLOOD PRESSURE: 118 MMHG | HEIGHT: 62 IN | BODY MASS INDEX: 27.02 KG/M2 | HEART RATE: 72 BPM

## 2025-08-25 DIAGNOSIS — I10 ESSENTIAL HYPERTENSION: Primary | Chronic | ICD-10-CM

## 2025-08-25 DIAGNOSIS — Z51.81 MEDICATION MONITORING ENCOUNTER: ICD-10-CM

## 2025-08-25 DIAGNOSIS — E78.2 MIXED HYPERLIPIDEMIA: Chronic | ICD-10-CM

## 2025-08-25 DIAGNOSIS — R73.03 PREDIABETES: Chronic | ICD-10-CM

## 2025-08-25 DIAGNOSIS — K21.9 GASTROESOPHAGEAL REFLUX DISEASE WITHOUT ESOPHAGITIS: ICD-10-CM

## 2025-08-25 DIAGNOSIS — B02.8 HERPES ZOSTER WITH OTHER COMPLICATION: Chronic | ICD-10-CM

## 2025-08-25 DIAGNOSIS — I47.10 SVT (SUPRAVENTRICULAR TACHYCARDIA): ICD-10-CM

## 2025-08-25 DIAGNOSIS — F41.1 GENERALIZED ANXIETY DISORDER: Chronic | ICD-10-CM

## 2025-08-25 PROCEDURE — 80050 GENERAL HEALTH PANEL: CPT | Performed by: INTERNAL MEDICINE

## 2025-08-25 PROCEDURE — 99214 OFFICE O/P EST MOD 30 MIN: CPT | Performed by: INTERNAL MEDICINE

## 2025-08-25 PROCEDURE — 80061 LIPID PANEL: CPT | Performed by: INTERNAL MEDICINE

## 2025-08-25 PROCEDURE — 82607 VITAMIN B-12: CPT | Performed by: INTERNAL MEDICINE

## 2025-08-25 PROCEDURE — 80307 DRUG TEST PRSMV CHEM ANLYZR: CPT | Performed by: INTERNAL MEDICINE

## 2025-08-25 PROCEDURE — 83036 HEMOGLOBIN GLYCOSYLATED A1C: CPT | Performed by: INTERNAL MEDICINE

## 2025-08-25 PROCEDURE — 84439 ASSAY OF FREE THYROXINE: CPT | Performed by: INTERNAL MEDICINE

## 2025-08-25 PROCEDURE — 82550 ASSAY OF CK (CPK): CPT | Performed by: INTERNAL MEDICINE

## 2025-08-25 RX ORDER — VALACYCLOVIR HYDROCHLORIDE 1 G/1
1000 TABLET, FILM COATED ORAL DAILY
Qty: 90 TABLET | Refills: 1 | Status: SHIPPED | OUTPATIENT
Start: 2025-08-25

## 2025-08-25 RX ORDER — LISINOPRIL 10 MG/1
10 TABLET ORAL DAILY
Qty: 90 TABLET | Refills: 1 | Status: SHIPPED | OUTPATIENT
Start: 2025-08-25

## 2025-08-25 RX ORDER — METOPROLOL SUCCINATE 50 MG/1
50 TABLET, EXTENDED RELEASE ORAL
Qty: 90 TABLET | Refills: 1 | Status: SHIPPED | OUTPATIENT
Start: 2025-08-25

## 2025-08-25 RX ORDER — ROSUVASTATIN CALCIUM 10 MG/1
10 TABLET, COATED ORAL DAILY
Qty: 90 TABLET | Refills: 1 | Status: SHIPPED | OUTPATIENT
Start: 2025-08-25

## 2025-08-25 RX ORDER — ESOMEPRAZOLE MAGNESIUM 40 MG/1
40 CAPSULE, DELAYED RELEASE ORAL
Qty: 90 CAPSULE | Refills: 1 | Status: SHIPPED | OUTPATIENT
Start: 2025-08-25

## 2025-08-25 RX ORDER — BUSPIRONE HYDROCHLORIDE 5 MG/1
5 TABLET ORAL 2 TIMES DAILY
Qty: 60 TABLET | Refills: 5 | Status: SHIPPED | OUTPATIENT
Start: 2025-08-25

## 2025-08-25 RX ORDER — LORAZEPAM 1 MG/1
1 TABLET ORAL EVERY 8 HOURS PRN
Qty: 90 TABLET | Refills: 1 | Status: SHIPPED | OUTPATIENT
Start: 2025-08-25

## 2025-08-26 LAB
ALBUMIN SERPL-MCNC: 4.6 G/DL (ref 3.5–5.2)
ALBUMIN/GLOB SERPL: 1.6 G/DL
ALP SERPL-CCNC: 85 U/L (ref 39–117)
ALT SERPL W P-5'-P-CCNC: 20 U/L (ref 1–33)
ANION GAP SERPL CALCULATED.3IONS-SCNC: 14.9 MMOL/L (ref 5–15)
AST SERPL-CCNC: 21 U/L (ref 1–32)
BILIRUB SERPL-MCNC: 0.3 MG/DL (ref 0–1.2)
BUN SERPL-MCNC: 15 MG/DL (ref 8–23)
BUN/CREAT SERPL: 14.6 (ref 7–25)
CALCIUM SPEC-SCNC: 10.1 MG/DL (ref 8.6–10.5)
CHLORIDE SERPL-SCNC: 102 MMOL/L (ref 98–107)
CHOLEST SERPL-MCNC: 173 MG/DL (ref 0–200)
CK SERPL-CCNC: 50 U/L (ref 20–180)
CO2 SERPL-SCNC: 22.1 MMOL/L (ref 22–29)
CREAT SERPL-MCNC: 1.03 MG/DL (ref 0.57–1)
DEPRECATED RDW RBC AUTO: 42.4 FL (ref 37–54)
EGFRCR SERPLBLD CKD-EPI 2021: 62.4 ML/MIN/1.73
ERYTHROCYTE [DISTWIDTH] IN BLOOD BY AUTOMATED COUNT: 12.5 % (ref 12.3–15.4)
GLOBULIN UR ELPH-MCNC: 2.9 GM/DL
GLUCOSE SERPL-MCNC: 96 MG/DL (ref 65–99)
HBA1C MFR BLD: 5.8 % (ref 4.8–5.6)
HCT VFR BLD AUTO: 41 % (ref 34–46.6)
HDLC SERPL-MCNC: 41 MG/DL (ref 40–60)
HGB BLD-MCNC: 13.8 G/DL (ref 12–15.9)
LDLC SERPL CALC-MCNC: 80 MG/DL (ref 0–100)
LDLC/HDLC SERPL: 1.64 {RATIO}
MCH RBC QN AUTO: 31.2 PG (ref 26.6–33)
MCHC RBC AUTO-ENTMCNC: 33.7 G/DL (ref 31.5–35.7)
MCV RBC AUTO: 92.6 FL (ref 79–97)
PLATELET # BLD AUTO: 245 10*3/MM3 (ref 140–450)
PMV BLD AUTO: 10.7 FL (ref 6–12)
POTASSIUM SERPL-SCNC: 4.3 MMOL/L (ref 3.5–5.2)
PROT SERPL-MCNC: 7.5 G/DL (ref 6–8.5)
RBC # BLD AUTO: 4.43 10*6/MM3 (ref 3.77–5.28)
SODIUM SERPL-SCNC: 139 MMOL/L (ref 136–145)
T4 FREE SERPL-MCNC: 1.15 NG/DL (ref 0.92–1.68)
TRIGL SERPL-MCNC: 324 MG/DL (ref 0–150)
TSH SERPL DL<=0.05 MIU/L-ACNC: 2.54 UIU/ML (ref 0.27–4.2)
VIT B12 BLD-MCNC: 443 PG/ML (ref 211–946)
VLDLC SERPL-MCNC: 52 MG/DL (ref 5–40)
WBC NRBC COR # BLD AUTO: 8.09 10*3/MM3 (ref 3.4–10.8)